# Patient Record
Sex: FEMALE | Race: WHITE | NOT HISPANIC OR LATINO | Employment: OTHER | URBAN - METROPOLITAN AREA
[De-identification: names, ages, dates, MRNs, and addresses within clinical notes are randomized per-mention and may not be internally consistent; named-entity substitution may affect disease eponyms.]

---

## 2017-01-06 ENCOUNTER — NON-PROVIDER VISIT (OUTPATIENT)
Dept: MEDICAL GROUP | Facility: CLINIC | Age: 52
End: 2017-01-06
Payer: COMMERCIAL

## 2017-01-06 DIAGNOSIS — R73.01 IFG (IMPAIRED FASTING GLUCOSE): ICD-10-CM

## 2017-01-06 PROCEDURE — G0109 DIAB MANAGE TRN IND/GROUP: HCPCS | Performed by: DIETITIAN, REGISTERED

## 2017-01-06 NOTE — Clinical Note
Dr. Nieves,    Your patient, Lew Dinh, was seen January 6, 2017 for 2 hours regarding nutrition/exercise recommendations for diabetes as part of day 2 of our Type 2 diabetes management class.  She was taught the following information:     -The basics of nutrition  -The plate method for portion control (¼ plate starch, ¼ plate protein, ½ plate non-starchy vegetables)  -Appropriate snacking recommendations  -Heart-healthy eating, including controlling/managing/improving hyperlipidemia and HTN  -Food label reading, including CHO counting  -Exercise recommendations of at least 150 minutes/week of moderate-intensity exercise on a minimum of 3 days each week    Lew was encouraged to use community resources to help improve their glycemic control and given a number of resources available to her.  She was also given our phone number in the case of any question that may arise.      Thank you for your referral for this patient.  Feel free to contact us with any questions you may have at [phone].    Sincerely,    PURVI [place] Jr, RD, LD, CDE  Outpatient Dietary Educator  [facility]

## 2017-01-06 NOTE — PROGRESS NOTES
1/6/2017    Vivienne Nieves D.O.  51 y.o.   Time in/out: 439 - [phone]    Subjective:  -Here for day 2 of type 2 class    Nutrition Diagnosis (PES Statement)    Altered nutrition-related lab values related to endocrine dysfunction as evidenced by HbA1c of 6.8%.    Biochemical data, medical test and procedures  Lab Results   Component Value Date/Time    GLYCOHEMOGLOBIN 6.8 10/27/2016 04:30 PM     Lab Results   Component Value Date/Time    CHOLESTEROL,* 10/14/2016 08:16 AM    * 10/14/2016 08:16 AM    HDL 44 10/14/2016 08:16 AM    TRIGLYCERIDES 159* 10/14/2016 08:16 AM         Nutrition Intervention  Meal and Snack  Recommend a general/healthful diet    Comprehensive Nutrition education Instruction or training leading to in-depth nutrition related knowledge about:  Benefits to following meal plan, Combine carb, protein and fat at each meal, Eating out, Fast food, Meal timing and spacing, Menu Planning, Metabolism of carb, protein, fat, Physical activity/exercise, Portion control, Sweets and alcohol in moderation, Heart-healthy guidelines, Increasing/Decreasing PO intake, Label Reading and Handouts provided regarding topics discussed    Monitoring & Evaluation Plan  Behavioral-Environmental:  Behavior:  Consistent CHO intake throughout the day  Physical activity:  Increase as tolerated    Food / Nutrient Intake:  Food intake:  Use the plate method recommendations for portions/balance at meals  Macronutrient intake:  ~45 grams CHO with meals, 15-20 grams CHO with snacks    Physical Signs / Symptoms:  HbA1c profiles:  Within ADA guidelines      Assessment Notes:  Lew attended day 2 of the Type 2 class today.  She was taught that exercise works as a medication for controlling DM.  Different exercises were shown that can be done easily at home, like walking in place, lifting light weights while sitting, etc.  It was emphasized that if exercise is a consistent part of Lew’s habits that DM control will be  the most ideal it can be.   Food was then discussed next, with a brief review of Lew’s current eating habits.  She was taught the differences between CHO/protein/fat and their effects on BG’s.  This information was related to the plate method to help with meal planning/portions at meals; she was also taught to use their hands as measuring tools (fist for starch, palm for protein) to help when eating out or on a large plate.  Non-starchy vegetables were emphasized as foods that will help satisfy without raising BG’s at meals and snacks.  I stressed to the patient to not go more than 4 hours without eating and if a snack is necessary she was taught how to construct a proper snack of ~15 grams CHO and ~7 grams protein.  Finally, we moved onto the food label and how to effectively read a label using serving size, trans fat, total CHO, and % sodium to evaluate a food.  An alternative way of meal planning was given by using the food label and CHO counting; the patient can eat ~45 grams CHO at meals and 15-20 grams CHO at snacks, if needed.  Lew set goals to try to achieve in the next 3 months to help with DM control; she can follow-up with me, if needed, for a more intensive meal plan and CHO counting education.  F/u prn.

## 2017-01-06 NOTE — MR AVS SNAPSHOT
Thierry Ford Dinh   2017 9:00 AM   Non-Provider Visit   MRN: [id number]    Department:  [facility]   Dept Phone:  [phone]    Description:  Female : 1965   Provider:  Eric [place] RD           Reason for Visit     Diabetes           Allergies as of 2017     Allergen Noted Reactions    Nkda [No Known Drug Allergy] 2009         You were diagnosed with     IFG (impaired fasting glucose)   [290792]         Vital Signs     Last Menstrual Period Smoking Status                2006 Never Smoker           Basic Information     Date Of Birth Sex Race Ethnicity Preferred Language    1965 Female White Non- English      Your appointments     2017 10:00 AM   Follow UP with ANUEL Menjivar   [facility] Sleep Medicine (--)    [address]  [place] [id number] [phone]            2017  4:00 PM   Established Patient with Vivienne Nieves D.O.   [facility])    [address], [address]  [place] [id number] [phone]           You will be receiving a confirmation call a few days before your appointment from our automated call confirmation system.              Problem List              ICD-10-CM Priority Class Noted - Resolved    Depression with anxiety F41.8   2009 - Present    Allergic rhinitis J30.9   2009 - Present    Hyperlipidemia with target LDL less than 100 E78.5   2010 - Present    Essential hypertension I10   2012 - Present    Fatigue R53.83   2012 - Present    Sleep related hypoxia G47.34   2012 - Present    Obstructive sleep apnea G47.33   10/12/2016 - Present    Hypersomnolence G47.10   10/12/2016 - Present    Insomnia G47.00   10/12/2016 - Present    IFG (impaired fasting glucose) R73.01   10/27/2016 - Present    Obesity (BMI 30.0-34.9) E66.9   10/27/2016 - Present    Body mass index 33.0-33.9, adult Z68.33   10/27/2016 -  Present      Health Maintenance        Date Due Completion Dates    MAMMOGRAM 8/25/2015 8/25/2014, 8/22/2013, 1/3/2012, 9/4/2009, 9/4/2009    PAP SMEAR 9/1/2019 9/1/2016, 8/19/2013, 8/6/2009    IMM DTaP/Tdap/Td Vaccine (2 - Td) 10/24/2021 10/24/2011    COLONOSCOPY 12/28/2026 12/28/2016            Current Immunizations     Hepatitis A Vaccine, Adult 3/10/2015, 7/1/2014    Hepatitis B Vaccine Recombivax (Adol/Adult) 3/10/2015, 8/1/2014, 7/1/2014    Influenza TIV (IM) 11/30/2015, 10/24/2011    Influenza Vaccine Quad Inj (Pf) 9/22/2016    MMR Vaccine 9/4/2014  4:35 PM, 7/1/2014    Tdap Vaccine 10/24/2011    Tuberculin Skin Test 9/16/2014 11:26 AM, 9/2/2014    Varicella Vaccine Live 8/1/2014, 7/1/2014      Below and/or attached are the medications your provider expects you to take. Review all of your home medications and newly ordered medications with your provider and/or pharmacist. Follow medication instructions as directed by your provider and/or pharmacist. Please keep your medication list with you and share with your provider. Update the information when medications are discontinued, doses are changed, or new medications (including over-the-counter products) are added; and carry medication information at all times in the event of emergency situations     Allergies:  NKDA - (reactions not documented)               Medications  Valid as of: January 06, 2017 - 11:51 AM    Generic Name Brand Name Tablet Size Instructions for use    Atorvastatin Calcium (Tab) LIPITOR 80 MG Take 1 Tab by mouth every evening.        Blood Glucose Monitoring Suppl (Misc) Blood Glucose Monitoring Suppl SUPPLIES Test Strips (brand as accepted by insurance)Sig: Use AM fasting.        DiazePAM (Tab) VALIUM 5 MG Take 1 Tab by mouth every 8 hours as needed for Anxiety. Followup visit for additional renewals        Estradiol (Tab) VAGIFEM 10 MCG         Glucose Blood (Strip) RELION PRIME TEST  Apply 1 Strip to affected area(s) every day.         Lancet Devices (Misc) Lancet Device  MATCH TO CURRENT GLUCOMETER, To test fasting in the AM        MetFORMIN HCl (TABLET SR 24 HR) GLUCOPHAGE  MG Take 1 Tab by mouth every day.        Misc. Devices (Kit) Misc. Devices  Glucometer (brand as accepted by insurance). To test fasting in the AM        Triamcinolone Acetonide (Ointment) KENALOG 0.1 % Apply bid with moisturizer as needed for rash        Valsartan (Tab) DIOVAN 160 MG Take 1 Tab by mouth every day.        Venlafaxine HCl (Tab) EFFEXOR 75 MG Take 1 Tab by mouth 2 times a day, with meals.        Zolpidem Tartrate (Tab) AMBIEN 5 MG Take 1 Tab by mouth at bedtime as needed for Sleep.        .                 Medicines prescribed today were sent to:     [facility] [address] [place] [address], [place] - [address] [place] [address]    Phone: [phone] Fax: [phone]    Open 24 Hours?: No      Medication refill instructions:       If your prescription bottle indicates you have medication refills left, it is not necessary to call your provider’s office. Please contact your pharmacy and they will refill your medication.    If your prescription bottle indicates you do not have any refills left, you may request refills at any time through one of the following ways: The online Akimbo system (except Urgent Care), by calling your provider’s office, or by asking your pharmacy to contact your provider’s office with a refill request. Medication refills are processed only during regular business hours and may not be available until the next business day. Your provider may request additional information or to have a follow-up visit with you prior to refilling your medication.   *Please Note: Medication refills are assigned a new Rx number when refilled electronically. Your pharmacy may indicate that no refills were authorized even though a new prescription for the same medication is available at the pharmacy. Please request the medicine by  name with the pharmacy before contacting your provider for a refill.           SonicLiving Access Code: [email or web address]  Expires: 1/7/2017  5:30 PM    SonicLiving  A secure, online tool to manage your health information     [employer]’s SonicLiving® is a secure, online tool that connects you to your personalized health information from the privacy of your home -- day or night - making it very easy for you to manage your healthcare. Once the activation process is completed, you can even access your medical information using the SonicLiving etta, which is available for free in the Apple Etta store or Google Play store.     SonicLiving provides the following levels of access (as shown below):   My Chart Features   [facility] Primary [facility] Doctor [facility]  Specialists [facility]  Urgent  Care Non-[facility]  Primary Care  Doctor   Email your healthcare team securely and privately 24/7 X X X    Manage appointments: schedule your next appointment; view details of past/upcoming appointments X      Request prescription refills. X      View recent personal medical records, including lab and immunizations X X X X   View health record, including health history, allergies, medications X X X X   Read reports about your outpatient visits, procedures, consult and ER notes X X X X   See your discharge summary, which is a recap of your hospital and/or ER visit that includes your diagnosis, lab results, and care plan. X X       How to register for SonicLiving:  1. Go to  [email or web address].  2. Click on the Sign Up Now box, which takes you to the New Member Sign Up page. You will need to provide the following information:  a. Enter your SonicLiving Access Code exactly as it appears at the top of this page. (You will not need to use this code after you’ve completed the sign-up process. If you do not sign up before the expiration date, you must request a new code.)   b. Enter your date of birth.   c. Enter your home email address.   d. Click Submit, and follow the  next screen’s instructions.  3. Create a Expedit.ust ID. This will be your Expedit.ust login ID and cannot be changed, so think of one that is secure and easy to remember.  4. Create a Expedit.ust password. You can change your password at any time.  5. Enter your Password Reset Question and Answer. This can be used at a later time if you forget your password.   6. Enter your e-mail address. This allows you to receive e-mail notifications when new information is available in Openet.  7. Click Sign Up. You can now view your health information.    For assistance activating your Openet account, call [phone]

## 2017-01-13 ENCOUNTER — SLEEP CENTER VISIT (OUTPATIENT)
Dept: SLEEP MEDICINE | Facility: MEDICAL CENTER | Age: 52
End: 2017-01-13
Payer: COMMERCIAL

## 2017-01-13 VITALS
BODY MASS INDEX: 33.29 KG/M2 | HEART RATE: 109 BPM | DIASTOLIC BLOOD PRESSURE: 88 MMHG | WEIGHT: 195 LBS | HEIGHT: 64 IN | SYSTOLIC BLOOD PRESSURE: 124 MMHG | TEMPERATURE: 97.7 F | RESPIRATION RATE: 15 BRPM

## 2017-01-13 DIAGNOSIS — R53.82 CHRONIC FATIGUE: ICD-10-CM

## 2017-01-13 DIAGNOSIS — G47.00 INSOMNIA, UNSPECIFIED TYPE: ICD-10-CM

## 2017-01-13 DIAGNOSIS — F41.8 DEPRESSION WITH ANXIETY: ICD-10-CM

## 2017-01-13 DIAGNOSIS — G47.33 OBSTRUCTIVE SLEEP APNEA: ICD-10-CM

## 2017-01-13 DIAGNOSIS — I10 ESSENTIAL HYPERTENSION: ICD-10-CM

## 2017-01-13 PROCEDURE — 99213 OFFICE O/P EST LOW 20 MIN: CPT | Performed by: NURSE PRACTITIONER

## 2017-01-13 RX ORDER — ZOLPIDEM TARTRATE 5 MG/1
5 TABLET ORAL NIGHTLY PRN
Qty: 30 TAB | Refills: 0 | Status: SHIPPED | OUTPATIENT
Start: 2017-01-13 | End: 2017-04-27 | Stop reason: SDUPTHER

## 2017-01-13 NOTE — Clinical Note
ANUEL Menjivar  [facility]   [address] [place] [address]  Phone: [phone] - Fax: [phone]           Encounter Date: 1/13/2017  Provider: ANUEL Menjivar  Location of Care: [facility] SLEEP [facility]      Patient:   Thierry Dinh   MR Number: [id number]   YOB: 1965     PROGRESS NOTE:  Chief Complaint   Patient presents with   • Follow-Up     6W         HPI: This patient is a 51 y.o. female, who presents for 6 week follow-up of mild JAMIE. She was initially referred by Dr. Scott Falcon. She has had difficulty in falling asleep and staying asleep since the age of 16. She started on sleeping medications around age 26, she has used amitriptyline in the past. She was given a prescription for Ambien at her last visit, she has been using this very sparingly. She is requesting a refill today. She tells me her insomnia is actually improved significantly. She has discontinued amitriptyline and is now taking Effexor in the mornings. She now has a rare nights of insomnia for which she will use Ambien. She understands to use this sparingly.    In regards to JAMIE, Dr. Falcon performed two night home sleep testing demonstrated an apnea hypopnea index of 28.2 events per hour with a lowest arterial oxygen saturation of 85% on room air during the first night and an apnea hypopnea index of 14.5 events per hour with a lowest saturation of 87% on the second night. PSG 9/1/16 indicated mild JAMIE with AHI 5.5 and normal oximetry. Patient wanted to try dental appliance and weight loss. She has not yet obtained a dental appliance. She has a history of lap band surgery and weight loss. Unfortunately she has gained some of the weight back. She also has a history of chronic hip pain and occasionally requires of Valium before bedtime that only slightly helps. I reviewed with her again the treatment options for sleep apnea  including CPAP. She declines. Again she would like to try dental appliance and plans on obtaining this through Dr Falcon.     Past Medical History   Diagnosis Date   • Anxiety    • Psychiatric problem      depression   • Hiatus hernia syndrome    • Heart burn    • Pain      stomach   • Hypertension    • Hyperlipidemia    • Sleep apnea    • Chickenpox    • Khmer measles    • Mumps    • Influenza    • Tonsillitis        Social History   Substance Use Topics   • Smoking status: Never Smoker    • Smokeless tobacco: Never Used      Comment: avoid all tobacco products   • Alcohol Use: 1.2 oz/week     2 Glasses of wine per week      Comment: two per day       Family History   Problem Relation Age of Onset   • Hypertension Mother    • Diabetes Mother    • Stroke Mother    • Heart Disease Mother    • Stroke     • Genetic Father      Parkinsons   • Hypertension Father    • Hyperlipidemia Father    • Hypertension Sister    • Diabetes Maternal Uncle    • Diabetes Paternal Aunt    • Diabetes Paternal Uncle    • Genetic Maternal Grandmother      kidney failure   • Arthritis Maternal Grandfather    • Heart Disease Maternal Grandfather    • Sleep Apnea Neg Hx        Current medications as of today   Current Outpatient Prescriptions   Medication Sig Dispense Refill   • zolpidem (AMBIEN) 5 MG Tab Take 1 Tab by mouth at bedtime as needed for Sleep. 30 Tab 0   • RELION PRIME TEST strip Apply 1 Strip to affected area(s) every day.     • Misc. Devices Kit Glucometer (brand as accepted by insurance). To test fasting in the AM 1 Kit 0   • Lancet Device Misc MATCH TO CURRENT GLUCOMETER, To test fasting in the  Each 4   • Blood Glucose Monitoring Suppl SUPPLIES Misc Test Strips (brand as accepted by insurance)Sig: Use AM fasting. 100 Each 4   • metformin ER (GLUCOPHAGE XR) 500 MG TABLET SR 24 HR Take 1 Tab by mouth every day. 30 Tab 3   • atorvastatin (LIPITOR) 80 MG tablet Take 1 Tab by mouth every evening. 90 Tab 4   • triamcinolone  "acetonide (KENALOG) 0.1 % Ointment Apply bid with moisturizer as needed for rash 1 Tube 2   • VAGIFEM 10 MCG      • valsartan (DIOVAN) 160 MG Tab Take 1 Tab by mouth every day. 90 Tab 4   • venlafaxine (EFFEXOR) 75 MG Tab Take 1 Tab by mouth 2 times a day, with meals. (Patient taking differently: Take 75 mg by mouth every day.) 180 Tab 4   • diazepam (VALIUM) 5 MG Tab Take 1 Tab by mouth every 8 hours as needed for Anxiety. Followup visit for additional renewals 60 Tab 2     No current facility-administered medications for this visit.       Allergies: Nkda    Blood pressure 124/88, pulse 109, temperature 36.5 °C (97.7 °F), temperature source Temporal, resp. rate 15, height 1.626 m (5' 4.02\"), weight 88.451 kg (195 lb).      ROS:   Constitutional: Denies fevers, chills, night sweats, weight loss. Positive for fatigue.  HEENT: Denies earache, difficulty hearing, tinnitus, nasal congestion, hoarseness  Cardiovascular: Denies chest pain, tightness, palpitations, orthopnea or edema  Respiratory: Denies cough, wheeze, dyspnea, hemoptysis  Sleep: See HPI  GI: Denies heartburn, dysphagia, nausea, abdominal pain, diarrhea or constipation  : Denies frequent urination, hematuria, discharge or painful urination  Musculoskeletal: Positive for chronic  hip pain  Neurological: Denies weakness or headaches  Skin: No rashes    Physical exam:   Appearance: Well-nourished, well-developed, in no acute distress  HEENT: Normocephalic, atraumatic, white sclera, PERRLA  Respiratory: no intercostal retractions or accessory muscle use   Gait: Normal  Digits: No clubbing, cyanosis  Motor: No focal deficits  Orientation: Oriented to time, person and place    Diagnosis:  1. Obstructive sleep apnea  zolpidem (AMBIEN) 5 MG Tab   2. Insomnia, unspecified type  zolpidem (AMBIEN) 5 MG Tab   3. Body mass index 33.0-33.9, adult     4. Depression with anxiety     5. Essential hypertension     6. Chronic fatigue         Plan:  1. Rx refill of Ambien " provided. If insomnia worsens referral to Dr Jaci Walsh is recommended. I will defer future refills of Ambien to patient's PCP. We do not need to continue to see her for her periodic insomnia.  2. In regards to JAMIE, dental appliance is recommended. She will obtain this from Dr. Falcon. Home sleep study can be repeated after dental appliance is obtained to confirm improvement in obstructive events and nocturnal oxygen saturations.  3. Follow-up here as needed          Electronically signed by ANUEL Menjivar  on 01/13/2017    Vivienne Nieves D.O.  [address]  [address]  [place] [address]  VIA In Basket     Esteban Falcon D.D.S.  [address]  [place] [address]  VIA Facsimile: [phone]

## 2017-01-13 NOTE — PATIENT INSTRUCTIONS
1. Rx for Ambien provided, future refills from PCP  2. Obtain dental appliance from Dr. Falcon  3. Follow-up here as needed

## 2017-01-13 NOTE — MR AVS SNAPSHOT
"        Thierry Dinh   2017 10:00 AM   Sleep Center Visit   MRN: [id number]    Department:  [facility]   Dept Phone:  [phone]    Description:  Female : 1965   Provider:  ANUEL Menjivar           Reason for Visit     Follow-Up 6W      Allergies as of 2017     Allergen Noted Reactions    Nkda [No Known Drug Allergy] 2009         You were diagnosed with     Obstructive sleep apnea   [924236]       Insomnia, unspecified type   [6650921]       Body mass index 33.0-33.9, adult   [V85.33.ICD-9-CM]       Depression with anxiety   [810239]       Essential hypertension   [7704775]       Chronic fatigue   [232038]         Vital Signs     Blood Pressure Pulse Temperature Respirations Height Weight    124/88 mmHg 109 36.5 °C (97.7 °F) (Temporal) 15 1.626 m (5' 4.02\") 88.451 kg (195 lb)    Body Mass Index Smoking Status                33.46 kg/m2 Never Smoker           Basic Information     Date Of Birth Sex Race Ethnicity Preferred Language    1965 Female White Non- English      Your appointments     2017  4:00 PM   Established Patient with Vivienne Nieves D.O.   Formerly [facility])    [address] [place] [address], [address]  [facility] [address] [phone]           You will be receiving a confirmation call a few days before your appointment from our automated call confirmation system.              Problem List              ICD-10-CM Priority Class Noted - Resolved    Depression with anxiety F41.8   2009 - Present    Allergic rhinitis J30.9   2009 - Present    Hyperlipidemia with target LDL less than 100 E78.5   2010 - Present    Essential hypertension I10   2012 - Present    Fatigue R53.83   2012 - Present    Sleep related hypoxia G47.34   2012 - Present    Obstructive sleep apnea G47.33   10/12/2016 - Present    Hypersomnolence G47.10   10/12/2016 - Present    Insomnia G47.00   10/12/2016 - Present   "    IFG (impaired fasting glucose) R73.01   10/27/2016 - Present    Obesity (BMI 30.0-34.9) E66.9   10/27/2016 - Present    Body mass index 33.0-33.9, adult Z68.33   10/27/2016 - Present      Health Maintenance        Date Due Completion Dates    MAMMOGRAM 8/25/2015 8/25/2014, 8/22/2013, 1/3/2012, 9/4/2009, 9/4/2009    PAP SMEAR 9/1/2019 9/1/2016, 8/19/2013, 8/6/2009    IMM DTaP/Tdap/Td Vaccine (2 - Td) 10/24/2021 10/24/2011    COLONOSCOPY 12/28/2026 12/28/2016            Current Immunizations     Hepatitis A Vaccine, Adult 3/10/2015, 7/1/2014    Hepatitis B Vaccine Recombivax (Adol/Adult) 3/10/2015, 8/1/2014, 7/1/2014    Influenza TIV (IM) 11/30/2015, 10/24/2011    Influenza Vaccine Quad Inj (Pf) 9/22/2016    MMR Vaccine 9/4/2014  4:35 PM, 7/1/2014    Tdap Vaccine 10/24/2011    Tuberculin Skin Test 9/16/2014 11:26 AM, 9/2/2014    Varicella Vaccine Live 8/1/2014, 7/1/2014      Below and/or attached are the medications your provider expects you to take. Review all of your home medications and newly ordered medications with your provider and/or pharmacist. Follow medication instructions as directed by your provider and/or pharmacist. Please keep your medication list with you and share with your provider. Update the information when medications are discontinued, doses are changed, or new medications (including over-the-counter products) are added; and carry medication information at all times in the event of emergency situations     Allergies:  NKDA - (reactions not documented)               Medications  Valid as of: January 13, 2017 - 10:36 AM    Generic Name Brand Name Tablet Size Instructions for use    Atorvastatin Calcium (Tab) LIPITOR 80 MG Take 1 Tab by mouth every evening.        Blood Glucose Monitoring Suppl (Misc) Blood Glucose Monitoring Suppl SUPPLIES Test Strips (brand as accepted by insurance)Sig: Use AM fasting.        DiazePAM (Tab) VALIUM 5 MG Take 1 Tab by mouth every 8 hours as needed for Anxiety.  Followup visit for additional renewals        Estradiol (Tab) VAGIFEM 10 MCG         Glucose Blood (Strip) RELION PRIME TEST  Apply 1 Strip to affected area(s) every day.        Lancet Devices (Misc) Lancet Device  MATCH TO CURRENT GLUCOMETER, To test fasting in the AM        MetFORMIN HCl (TABLET SR 24 HR) GLUCOPHAGE  MG Take 1 Tab by mouth every day.        Misc. Devices (Kit) Misc. Devices  Glucometer (brand as accepted by insurance). To test fasting in the AM        Triamcinolone Acetonide (Ointment) KENALOG 0.1 % Apply bid with moisturizer as needed for rash        Valsartan (Tab) DIOVAN 160 MG Take 1 Tab by mouth every day.        Venlafaxine HCl (Tab) EFFEXOR 75 MG Take 1 Tab by mouth 2 times a day, with meals.        Zolpidem Tartrate (Tab) AMBIEN 5 MG Take 1 Tab by mouth at bedtime as needed for Sleep.        .                 Medicines prescribed today were sent to:     [facility] [address] [place] [id number]    Phone: [phone] Fax: [phone]    Open 24 Hours?: No      Medication refill instructions:       If your prescription bottle indicates you have medication refills left, it is not necessary to call your provider’s office. Please contact your pharmacy and they will refill your medication.    If your prescription bottle indicates you do not have any refills left, you may request refills at any time through one of the following ways: The online ClariFI system (except Urgent Care), by calling your provider’s office, or by asking your pharmacy to contact your provider’s office with a refill request. Medication refills are processed only during regular business hours and may not be available until the next business day. Your provider may request additional information or to have a follow-up visit with you prior to refilling your medication.   *Please Note: Medication refills are assigned a new Rx number when refilled electronically.  Your pharmacy may indicate that no refills were authorized even though a new prescription for the same medication is available at the pharmacy. Please request the medicine by name with the pharmacy before contacting your provider for a refill.        Instructions    1. Rx for Ambien provided, future refills from PCP  2. Obtain dental appliance from Dr. Falcon  3. Follow-up here as needed          Moovly Access Code: [email or web address]  Expires: 2/12/2017 10:36 AM    Moovly  A secure, online tool to manage your health information     [employer]’s Moovly® is a secure, online tool that connects you to your personalized health information from the privacy of your home -- day or night - making it very easy for you to manage your healthcare. Once the activation process is completed, you can even access your medical information using the Moovly etta, which is available for free in the Apple Etta store or Google Play store.     Moovly provides the following levels of access (as shown below):   My Chart Features   [facility] Primary Care Doctor [facility]  Specialists [facility]  Urgent  Care Non-[facility]  Primary Care  Doctor   Email your healthcare team securely and privately 24/7 X X X    Manage appointments: schedule your next appointment; view details of past/upcoming appointments X      Request prescription refills. X      View recent personal medical records, including lab and immunizations X X X X   View health record, including health history, allergies, medications X X X X   Read reports about your outpatient visits, procedures, consult and ER notes X X X X   See your discharge summary, which is a recap of your hospital and/or ER visit that includes your diagnosis, lab results, and care plan. X X       How to register for Moovly:  1. Go to  [email or web address].  2. Click on the Sign Up Now box, which takes you to the New Member Sign Up page. You will need to provide the following information:  a. Enter your Moovly  Access Code exactly as it appears at the top of this page. (You will not need to use this code after you’ve completed the sign-up process. If you do not sign up before the expiration date, you must request a new code.)   b. Enter your date of birth.   c. Enter your home email address.   d. Click Submit, and follow the next screen’s instructions.  3. Create a Globe Icons Interactive ID. This will be your Globe Icons Interactive login ID and cannot be changed, so think of one that is secure and easy to remember.  4. Create a [employer] password. You can change your password at any time.  5. Enter your Password Reset Question and Answer. This can be used at a later time if you forget your password.   6. Enter your e-mail address. This allows you to receive e-mail notifications when new information is available in Globe Icons Interactive.  7. Click Sign Up. You can now view your health information.    For assistance activating your Globe Icons Interactive account, call [phone]

## 2017-01-13 NOTE — PROGRESS NOTES
Chief Complaint   Patient presents with   • Follow-Up     6W         HPI: This patient is a 51 y.o. female, who presents for 6 week follow-up of mild JAMIE. She was initially referred by Dr. Scott Falcon. She has had difficulty in falling asleep and staying asleep since the age of 16. She started on sleeping medications around age 26, she has used amitriptyline in the past. She was given a prescription for Ambien at her last visit, she has been using this very sparingly. She is requesting a refill today. She tells me her insomnia is actually improved significantly. She has discontinued amitriptyline and is now taking Effexor in the mornings. She now has a rare nights of insomnia for which she will use Ambien. She understands to use this sparingly.    In regards to JAMIE, Dr. Falcon performed two night home sleep testing demonstrated an apnea hypopnea index of 28.2 events per hour with a lowest arterial oxygen saturation of 85% on room air during the first night and an apnea hypopnea index of 14.5 events per hour with a lowest saturation of 87% on the second night. PSG 9/1/16 indicated mild JAMIE with AHI 5.5 and normal oximetry. Patient wanted to try dental appliance and weight loss. She has not yet obtained a dental appliance. She has a history of lap band surgery and weight loss. Unfortunately she has gained some of the weight back. She also has a history of chronic hip pain and occasionally requires of Valium before bedtime that only slightly helps. I reviewed with her again the treatment options for sleep apnea including CPAP. She declines. Again she would like to try dental appliance and plans on obtaining this through Dr Falcon.     Past Medical History   Diagnosis Date   • Anxiety    • Psychiatric problem      depression   • Hiatus hernia syndrome    • Heart burn    • Pain      stomach   • Hypertension    • Hyperlipidemia    • Sleep apnea    • Chickenpox    • Italian measles    • Mumps    • Influenza    •  Tonsillitis        Social History   Substance Use Topics   • Smoking status: Never Smoker    • Smokeless tobacco: Never Used      Comment: avoid all tobacco products   • Alcohol Use: 1.2 oz/week     2 Glasses of wine per week      Comment: two per day       Family History   Problem Relation Age of Onset   • Hypertension Mother    • Diabetes Mother    • Stroke Mother    • Heart Disease Mother    • Stroke     • Genetic Father      Parkinsons   • Hypertension Father    • Hyperlipidemia Father    • Hypertension Sister    • Diabetes Maternal Uncle    • Diabetes Paternal Aunt    • Diabetes Paternal Uncle    • Genetic Maternal Grandmother      kidney failure   • Arthritis Maternal Grandfather    • Heart Disease Maternal Grandfather    • Sleep Apnea Neg Hx        Current medications as of today   Current Outpatient Prescriptions   Medication Sig Dispense Refill   • zolpidem (AMBIEN) 5 MG Tab Take 1 Tab by mouth at bedtime as needed for Sleep. 30 Tab 0   • RELION PRIME TEST strip Apply 1 Strip to affected area(s) every day.     • Misc. Devices Kit Glucometer (brand as accepted by insurance). To test fasting in the AM 1 Kit 0   • Lancet Device Misc MATCH TO CURRENT GLUCOMETER, To test fasting in the  Each 4   • Blood Glucose Monitoring Suppl SUPPLIES Misc Test Strips (brand as accepted by insurance)Sig: Use AM fasting. 100 Each 4   • metformin ER (GLUCOPHAGE XR) 500 MG TABLET SR 24 HR Take 1 Tab by mouth every day. 30 Tab 3   • atorvastatin (LIPITOR) 80 MG tablet Take 1 Tab by mouth every evening. 90 Tab 4   • triamcinolone acetonide (KENALOG) 0.1 % Ointment Apply bid with moisturizer as needed for rash 1 Tube 2   • VAGIFEM 10 MCG      • valsartan (DIOVAN) 160 MG Tab Take 1 Tab by mouth every day. 90 Tab 4   • venlafaxine (EFFEXOR) 75 MG Tab Take 1 Tab by mouth 2 times a day, with meals. (Patient taking differently: Take 75 mg by mouth every day.) 180 Tab 4   • diazepam (VALIUM) 5 MG Tab Take 1 Tab by mouth every 8  "hours as needed for Anxiety. Followup visit for additional renewals 60 Tab 2     No current facility-administered medications for this visit.       Allergies: Nkda    Blood pressure 124/88, pulse 109, temperature 36.5 °C (97.7 °F), temperature source Temporal, resp. rate 15, height 1.626 m (5' 4.02\"), weight 88.451 kg (195 lb).      ROS:   Constitutional: Denies fevers, chills, night sweats, weight loss. Positive for fatigue.  HEENT: Denies earache, difficulty hearing, tinnitus, nasal congestion, hoarseness  Cardiovascular: Denies chest pain, tightness, palpitations, orthopnea or edema  Respiratory: Denies cough, wheeze, dyspnea, hemoptysis  Sleep: See HPI  GI: Denies heartburn, dysphagia, nausea, abdominal pain, diarrhea or constipation  : Denies frequent urination, hematuria, discharge or painful urination  Musculoskeletal: Positive for chronic  hip pain  Neurological: Denies weakness or headaches  Skin: No rashes    Physical exam:   Appearance: Well-nourished, well-developed, in no acute distress  HEENT: Normocephalic, atraumatic, white sclera, PERRLA  Respiratory: no intercostal retractions or accessory muscle use   Gait: Normal  Digits: No clubbing, cyanosis  Motor: No focal deficits  Orientation: Oriented to time, person and place    Diagnosis:  1. Obstructive sleep apnea  zolpidem (AMBIEN) 5 MG Tab   2. Insomnia, unspecified type  zolpidem (AMBIEN) 5 MG Tab   3. Body mass index 33.0-33.9, adult     4. Depression with anxiety     5. Essential hypertension     6. Chronic fatigue         Plan:  1. Rx refill of Ambien provided. If insomnia worsens referral to Dr Jaci Walsh is recommended. I will defer future refills of Ambien to patient's PCP. We do not need to continue to see her for her periodic insomnia.  2. In regards to JAMIE, dental appliance is recommended. She will obtain this from Dr. Falcon. Home sleep study can be repeated after dental appliance is obtained to confirm improvement in obstructive events " and nocturnal oxygen saturations.  3. Follow-up here as needed

## 2017-01-31 RX ORDER — METFORMIN HYDROCHLORIDE 500 MG/1
TABLET, EXTENDED RELEASE ORAL
Qty: 30 TAB | Refills: 2 | Status: SHIPPED | OUTPATIENT
Start: 2017-01-31 | End: 2017-04-27 | Stop reason: SDUPTHER

## 2017-01-31 NOTE — TELEPHONE ENCOUNTER
Was the patient seen in the last year in this department? Yes     Does patient have an active prescription for medications requested? No     Received Request Via: Pharmacy      Pt met protocol?: Yes    Last A1C 10/16

## 2017-02-21 RX ORDER — VALSARTAN 160 MG/1
TABLET ORAL
Qty: 90 TAB | Refills: 0 | Status: SHIPPED | OUTPATIENT
Start: 2017-02-21 | End: 2017-05-21 | Stop reason: SDUPTHER

## 2017-02-21 RX ORDER — VENLAFAXINE 75 MG/1
TABLET ORAL
Qty: 180 TAB | Refills: 0 | Status: SHIPPED | OUTPATIENT
Start: 2017-02-21 | End: 2017-05-21 | Stop reason: SDUPTHER

## 2017-02-21 NOTE — TELEPHONE ENCOUNTER
Was the patient seen in the last year in this department? Yes     Does patient have an active prescription for medications requested? No     Received Request Via: Pharmacy      Pt met protocol?: Yes    LAST OV 12/08/16    BP Readings from Last 1 Encounters:   01/13/17 124/88

## 2017-03-23 ENCOUNTER — PATIENT MESSAGE (OUTPATIENT)
Dept: MEDICAL GROUP | Facility: PHYSICIAN GROUP | Age: 52
End: 2017-03-23

## 2017-03-23 DIAGNOSIS — G47.30 SLEEP APNEA, UNSPECIFIED TYPE: ICD-10-CM

## 2017-04-27 ENCOUNTER — HOSPITAL ENCOUNTER (OUTPATIENT)
Dept: RADIOLOGY | Facility: MEDICAL CENTER | Age: 52
End: 2017-04-27
Attending: FAMILY MEDICINE
Payer: COMMERCIAL

## 2017-04-27 ENCOUNTER — OFFICE VISIT (OUTPATIENT)
Dept: MEDICAL GROUP | Facility: PHYSICIAN GROUP | Age: 52
End: 2017-04-27
Payer: COMMERCIAL

## 2017-04-27 VITALS
HEIGHT: 64 IN | WEIGHT: 194 LBS | TEMPERATURE: 98.2 F | BODY MASS INDEX: 33.12 KG/M2 | HEART RATE: 109 BPM | SYSTOLIC BLOOD PRESSURE: 120 MMHG | OXYGEN SATURATION: 94 % | RESPIRATION RATE: 16 BRPM | DIASTOLIC BLOOD PRESSURE: 80 MMHG

## 2017-04-27 DIAGNOSIS — R73.01 IFG (IMPAIRED FASTING GLUCOSE): ICD-10-CM

## 2017-04-27 DIAGNOSIS — G47.00 INSOMNIA, UNSPECIFIED TYPE: ICD-10-CM

## 2017-04-27 DIAGNOSIS — Z12.31 SCREENING MAMMOGRAM, ENCOUNTER FOR: ICD-10-CM

## 2017-04-27 PROCEDURE — 99213 OFFICE O/P EST LOW 20 MIN: CPT | Performed by: FAMILY MEDICINE

## 2017-04-27 PROCEDURE — 77063 BREAST TOMOSYNTHESIS BI: CPT

## 2017-04-27 RX ORDER — ZOLPIDEM TARTRATE 5 MG/1
5 TABLET ORAL NIGHTLY PRN
Qty: 30 TAB | Refills: 3 | Status: SHIPPED | OUTPATIENT
Start: 2017-04-27 | End: 2018-02-16 | Stop reason: SDUPTHER

## 2017-04-27 RX ORDER — METFORMIN HYDROCHLORIDE 500 MG/1
TABLET, EXTENDED RELEASE ORAL
Qty: 90 TAB | Refills: 3 | Status: SHIPPED | OUTPATIENT
Start: 2017-04-27 | End: 2018-02-16

## 2017-04-27 NOTE — PROGRESS NOTES
Subjective:     Chief Complaint   Patient presents with   • Diabetes       Thierry Dinh is a 51 y.o. female here today for follow-up on diabetes. Patient was last seen 12/8/16. For follow-up on impaired fasting blood sugars. Hemoglobin A1c was 6.8 in October 2016. Patient was started on metformin.  Pt reports morning fasting blood sugars in the 150s.  Pt states GI upset from metformin has resolved.  Patient is compliant with medications. No side effects reported such as diarrhea, abdominal pain, dark tarry stool, hematochezia, headache, shakiness, or lightheadedness due to low blood sugar.  Patient denies chest pain, numbness and tingling in hands and feet, change in sensation in hands or feet, sores on feet, change in urine,  or change in vision.    Patient has history of insomnia. She uses Ambien as needed to induce sleep. Patient states uses less than once per week     Allergies   Allergen Reactions   • Nkda [No Known Drug Allergy]      Current medicines (including changes today)  Current Outpatient Prescriptions   Medication Sig Dispense Refill   • zolpidem (AMBIEN) 5 MG Tab Take 1 Tab by mouth at bedtime as needed for Sleep. 30 Tab 3   • metformin ER (GLUCOPHAGE XR) 500 MG TABLET SR 24 HR TAKE ONE TABLET BY MOUTH ONCE DAILY 90 Tab 3   • valsartan (DIOVAN) 160 MG Tab TAKE ONE TABLET BY MOUTH ONCE DAILY 90 Tab 0   • venlafaxine (EFFEXOR) 75 MG Tab TAKE ONE TABLET BY MOUTH TWICE DAILY WITH MEALS 180 Tab 0   • atorvastatin (LIPITOR) 80 MG tablet Take 1 Tab by mouth every evening. 90 Tab 4   • RELION PRIME TEST strip Apply 1 Strip to affected area(s) every day.     • Misc. Devices Kit Glucometer (brand as accepted by insurance). To test fasting in the AM 1 Kit 0   • Lancet Device Misc MATCH TO CURRENT GLUCOMETER, To test fasting in the  Each 4   • Blood Glucose Monitoring Suppl SUPPLIES Misc Test Strips (brand as accepted by insurance)Sig: Use AM fasting. 100 Each 4   • triamcinolone acetonide (KENALOG)  0.1 % Ointment Apply bid with moisturizer as needed for rash 1 Tube 2   • VAGIFEM 10 MCG      • diazepam (VALIUM) 5 MG Tab Take 1 Tab by mouth every 8 hours as needed for Anxiety. Followup visit for additional renewals 60 Tab 2     No current facility-administered medications for this visit.     Social History   Substance Use Topics   • Smoking status: Never Smoker    • Smokeless tobacco: Never Used      Comment: avoid all tobacco products   • Alcohol Use: 1.2 oz/week     2 Glasses of wine per week      Comment: two per day     Family Status   Relation Status Death Age   • Mother Alive    • Father Alive    • Sister Alive    • Maternal Uncle Alive    • Paternal Aunt Alive    • Paternal Uncle Alive    • Maternal Grandmother     • Maternal Grandfather     • Brother Alive    • Maternal Aunt Alive    • Paternal Grandmother     • Paternal Grandfather       Family History   Problem Relation Age of Onset   • Hypertension Mother    • Diabetes Mother    • Stroke Mother    • Heart Disease Mother    • Stroke     • Genetic Father      Parkinsons   • Hypertension Father    • Hyperlipidemia Father    • Hypertension Sister    • Diabetes Maternal Uncle    • Diabetes Paternal Aunt    • Diabetes Paternal Uncle    • Genetic Maternal Grandmother      kidney failure   • Arthritis Maternal Grandfather    • Heart Disease Maternal Grandfather    • Sleep Apnea Neg Hx      She    has a past medical history of Anxiety; Psychiatric problem; Hiatus hernia syndrome; Heart burn; Pain; Hypertension; Hyperlipidemia; Sleep apnea; Chickenpox; Amharic measles; Mumps; Influenza; and Tonsillitis.        ROS  GEN: no weight loss, fevers, or chills  HEENT: no blurry vision or change in vision  Resp: no shortness of breath, no cough  CV: no racing heart, no irregular beats, no chest pain  Abd: no nausea, no vomiting, no diarrhea, no constipation, no blood in stool, no dark stools, no incontinence  : no dysuria, no hematuria,  "no urinary incontinence, no increased frequency  MSK: no muscle aches, no joint pain, no limited motion  Neuro: no headaches, no dizziness, no LOC, no weakness, no numbness/tingling       Objective:     Blood pressure 120/80, pulse 109, temperature 36.8 °C (98.2 °F), resp. rate 16, height 1.626 m (5' 4.02\"), weight 87.998 kg (194 lb), SpO2 94 %. Body mass index is 33.28 kg/(m^2).   Physical Exam:  Constitutional: Alert, no distress.  Skin: Warm, dry, good turgor, no rashes in visible areas.  Eye: Equal, round and reactive, conjunctiva clear, lids normal.  ENMT: Lips without lesions, oropharynx non-erythematous, no exudate, moist oral mucosa, bilateral tympanic membranes: No bulging, no retraction, no fluid, nonerythematous, positive light reflex, external auditory canals: Clear, scant cerumen, nonerythematous  Neck: Trachea midline, no masses, no thyromegaly. No cervical or supraclavicular lymphadenopathy. Full ROM  Respiratory: Unlabored respiratory effort, good air movement, lungs clear to auscultation, no wheezes, no ronchi.  Cardiovascular:RRR, +S1, S2, no murmur, no peripheral edema, pedal and radial pulses equal and intact bilat  Abdomen: Soft, non-tender, no masses, no hepatosplenomegaly.  MSK:5/5 muscle strength in upper extremities as well as lower extremity bilaterally  Psych: Alert and oriented x3, appropriate affect and mood.  Neuro: CN2-12 intact, no gross motor or sensory deficits      Assessment and Plan:   The following treatment plan was discussed    1. IFG (impaired fasting glucose)  Recommend labs for follow-up. Continue use of metformin. Pending labs will make further recommendations.   - HEMOGLOBIN A1C; Future  - MICROALBUMIN CREAT RATIO URINE; Future  - COMP METABOLIC PANEL; Future  - metformin ER (GLUCOPHAGE XR) 500 MG TABLET SR 24 HR; TAKE ONE TABLET BY MOUTH ONCE DAILY  Dispense: 90 Tab; Refill: 3    2. Insomnia, unspecified type  Chronic: Stable with use of Ambien.  verified.  - zolpidem " (AMBIEN) 5 MG Tab; Take 1 Tab by mouth at bedtime as needed for Sleep.  Dispense: 30 Tab; Refill: 3      Followup: Return in about 6 months (around 10/27/2017) for f/u ifg.    Please note that this dictation was created using voice recognition software. I have made every reasonable attempt to correct obvious errors, but I expect that there are errors of grammar and possibly content that I did not discover before finalizing the note.

## 2017-04-27 NOTE — MR AVS SNAPSHOT
"        Thierry Dinh   2017 4:00 PM   Office Visit   MRN: [id number]    Department:  [facility]   Dept Phone:  [phone]    Description:  Female : 1965   Provider:  Vivienne Nieves D.O.           Reason for Visit     Diabetes           Allergies as of 2017     Allergen Noted Reactions    Nkda [No Known Drug Allergy] 2009         You were diagnosed with     IFG (impaired fasting glucose)   [721854]       Primary insomnia   [886409]       Obstructive sleep apnea   [588265]       Insomnia, unspecified type   [3753412]         Vital Signs     Blood Pressure Pulse Temperature Respirations Height Weight    120/80 mmHg 109 36.8 °C (98.2 °F) 16 1.626 m (5' 4.02\") 87.998 kg (194 lb)    Body Mass Index Oxygen Saturation Smoking Status             33.28 kg/m2 94% Never Smoker          Basic Information     Date Of Birth Sex Race Ethnicity Preferred Language    1965 Female White Non- English      Problem List              ICD-10-CM Priority Class Noted - Resolved    Depression with anxiety F41.8   2009 - Present    Allergic rhinitis J30.9   2009 - Present    Hyperlipidemia with target LDL less than 100 E78.5   2010 - Present    Essential hypertension I10   2012 - Present    Fatigue R53.83   2012 - Present    Sleep related hypoxia G47.34   2012 - Present    Obstructive sleep apnea G47.33   10/12/2016 - Present    Hypersomnolence G47.10   10/12/2016 - Present    Insomnia G47.00   10/12/2016 - Present    IFG (impaired fasting glucose) R73.01   10/27/2016 - Present    Obesity (BMI 30.0-34.9) E66.9   10/27/2016 - Present    Body mass index 33.0-33.9, adult Z68.33   10/27/2016 - Present      Health Maintenance        Date Due Completion Dates    MAMMOGRAM 2015, 2013, 1/3/2012, 2009, 2009    PAP SMEAR 2019, 2013, 2009    IMM DTaP/Tdap/Td Vaccine (2 - Td) 10/24/2021 10/24/2011    COLONOSCOPY 2026 " 12/28/2016            Current Immunizations     Hepatitis A Vaccine, Adult 3/10/2015, 7/1/2014    Hepatitis B Vaccine Recombivax (Adol/Adult) 3/10/2015, 8/1/2014, 7/1/2014    Influenza TIV (IM) 11/30/2015, 10/24/2011    Influenza Vaccine Quad Inj (Pf) 9/22/2016    MMR Vaccine 9/4/2014  4:35 PM, 7/1/2014    Tdap Vaccine 10/24/2011    Tuberculin Skin Test 9/16/2014 11:26 AM, 9/2/2014    Varicella Vaccine Live 8/1/2014, 7/1/2014      Below and/or attached are the medications your provider expects you to take. Review all of your home medications and newly ordered medications with your provider and/or pharmacist. Follow medication instructions as directed by your provider and/or pharmacist. Please keep your medication list with you and share with your provider. Update the information when medications are discontinued, doses are changed, or new medications (including over-the-counter products) are added; and carry medication information at all times in the event of emergency situations     Allergies:  NKDA - (reactions not documented)               Medications  Valid as of: April 27, 2017 -  5:54 PM    Generic Name Brand Name Tablet Size Instructions for use    Atorvastatin Calcium (Tab) LIPITOR 80 MG Take 1 Tab by mouth every evening.        Blood Glucose Monitoring Suppl (Misc) Blood Glucose Monitoring Suppl SUPPLIES Test Strips (brand as accepted by insurance)Sig: Use AM fasting.        DiazePAM (Tab) VALIUM 5 MG Take 1 Tab by mouth every 8 hours as needed for Anxiety. Followup visit for additional renewals        Estradiol (Tab) VAGIFEM 10 MCG         Glucose Blood (Strip) RELION PRIME TEST  Apply 1 Strip to affected area(s) every day.        Lancet Devices (Misc) Lancet Device  MATCH TO CURRENT GLUCOMETER, To test fasting in the AM        MetFORMIN HCl (TABLET SR 24 HR) GLUCOPHAGE  MG TAKE ONE TABLET BY MOUTH ONCE DAILY        Misc. Devices (Kit) Misc. Devices  Glucometer (brand as accepted by insurance). To  test fasting in the AM        Triamcinolone Acetonide (Ointment) KENALOG 0.1 % Apply bid with moisturizer as needed for rash        Valsartan (Tab) DIOVAN 160 MG TAKE ONE TABLET BY MOUTH ONCE DAILY        Venlafaxine HCl (Tab) EFFEXOR 75 MG TAKE ONE TABLET BY MOUTH TWICE DAILY WITH MEALS        Zolpidem Tartrate (Tab) AMBIEN 5 MG Take 1 Tab by mouth at bedtime as needed for Sleep.        .                 Medicines prescribed today were sent to:     [address] [place] [address] [place] [address]    Phone: [phone] Fax: [phone]    Open 24 Hours?: No      Medication refill instructions:       If your prescription bottle indicates you have medication refills left, it is not necessary to call your provider’s office. Please contact your pharmacy and they will refill your medication.    If your prescription bottle indicates you do not have any refills left, you may request refills at any time through one of the following ways: The online [employer] system (except Urgent Care), by calling your provider’s office, or by asking your pharmacy to contact your provider’s office with a refill request. Medication refills are processed only during regular business hours and may not be available until the next business day. Your provider may request additional information or to have a follow-up visit with you prior to refilling your medication.   *Please Note: Medication refills are assigned a new Rx number when refilled electronically. Your pharmacy may indicate that no refills were authorized even though a new prescription for the same medication is available at the pharmacy. Please request the medicine by name with the pharmacy before contacting your provider for a refill.        Your To Do List     Future Labs/Procedures Complete By Expires    COMP METABOLIC PANEL  As directed 4/27/2018    HEMOGLOBIN A1C  As directed 4/27/2018    MICROALBUMIN CREAT RATIO URINE  As  directed 4/27/2018         [facility]cted Access Code: Activation code not generated  Current Health-[facility]cted Status: Active

## 2017-04-28 ENCOUNTER — HOSPITAL ENCOUNTER (OUTPATIENT)
Dept: LAB | Facility: MEDICAL CENTER | Age: 52
End: 2017-04-28
Attending: FAMILY MEDICINE
Payer: COMMERCIAL

## 2017-04-28 DIAGNOSIS — R73.01 IFG (IMPAIRED FASTING GLUCOSE): ICD-10-CM

## 2017-04-28 LAB
ALBUMIN SERPL BCP-MCNC: 4.1 G/DL (ref 3.2–4.9)
ALBUMIN/GLOB SERPL: 1.4 G/DL
ALP SERPL-CCNC: 58 U/L (ref 30–99)
ALT SERPL-CCNC: 16 U/L (ref 2–50)
ANION GAP SERPL CALC-SCNC: 11 MMOL/L (ref 0–11.9)
AST SERPL-CCNC: 26 U/L (ref 12–45)
BILIRUB SERPL-MCNC: 0.7 MG/DL (ref 0.1–1.5)
BUN SERPL-MCNC: 24 MG/DL (ref 8–22)
CALCIUM SERPL-MCNC: 9.1 MG/DL (ref 8.5–10.5)
CHLORIDE SERPL-SCNC: 109 MMOL/L (ref 96–112)
CO2 SERPL-SCNC: 19 MMOL/L (ref 20–33)
CREAT SERPL-MCNC: 0.72 MG/DL (ref 0.5–1.4)
CREAT UR-MCNC: 134.6 MG/DL
EST. AVERAGE GLUCOSE BLD GHB EST-MCNC: 143 MG/DL
GFR SERPL CREATININE-BSD FRML MDRD: >60 ML/MIN/1.73 M 2
GLOBULIN SER CALC-MCNC: 3 G/DL (ref 1.9–3.5)
GLUCOSE SERPL-MCNC: 152 MG/DL (ref 65–99)
HBA1C MFR BLD: 6.6 % (ref 0–5.6)
MICROALBUMIN UR-MCNC: 1.8 MG/DL
MICROALBUMIN/CREAT UR: 13 MG/G (ref 0–30)
POTASSIUM SERPL-SCNC: 4.8 MMOL/L (ref 3.6–5.5)
PROT SERPL-MCNC: 7.1 G/DL (ref 6–8.2)
SODIUM SERPL-SCNC: 139 MMOL/L (ref 135–145)

## 2017-04-28 PROCEDURE — 82570 ASSAY OF URINE CREATININE: CPT

## 2017-04-28 PROCEDURE — 82043 UR ALBUMIN QUANTITATIVE: CPT

## 2017-04-28 PROCEDURE — 36415 COLL VENOUS BLD VENIPUNCTURE: CPT

## 2017-04-28 PROCEDURE — 83036 HEMOGLOBIN GLYCOSYLATED A1C: CPT

## 2017-04-28 PROCEDURE — 80053 COMPREHEN METABOLIC PANEL: CPT

## 2017-05-02 DIAGNOSIS — R05.9 COUGH: ICD-10-CM

## 2017-05-02 RX ORDER — CODEINE PHOSPHATE/GUAIFENESIN 10-100MG/5
5 LIQUID (ML) ORAL 3 TIMES DAILY PRN
Qty: 120 ML | Refills: 0 | Status: SHIPPED
Start: 2017-05-02 | End: 2017-08-11

## 2017-05-22 RX ORDER — VALSARTAN 160 MG/1
TABLET ORAL
Qty: 90 TAB | Refills: 1 | Status: SHIPPED | OUTPATIENT
Start: 2017-05-22 | End: 2017-10-22 | Stop reason: SDUPTHER

## 2017-05-22 RX ORDER — VENLAFAXINE 75 MG/1
TABLET ORAL
Qty: 180 TAB | Refills: 0 | Status: SHIPPED | OUTPATIENT
Start: 2017-05-22 | End: 2018-01-28 | Stop reason: SDUPTHER

## 2017-05-22 NOTE — TELEPHONE ENCOUNTER
Refill X 6 months, sent to pharmacy.Pt. Seen in the last 6 months per protocol.   Lab Results   Component Value Date/Time    SODIUM 139 04/28/2017 07:32 AM    POTASSIUM 4.8 04/28/2017 07:32 AM    CHLORIDE 109 04/28/2017 07:32 AM    CO2 19* 04/28/2017 07:32 AM    GLUCOSE 152* 04/28/2017 07:32 AM    BUN 24* 04/28/2017 07:32 AM    CREATININE 0.72 04/28/2017 07:32 AM

## 2017-05-22 NOTE — TELEPHONE ENCOUNTER
Was the patient seen in the last year in this department? Yes     Does patient have an active prescription for medications requested? No     Received Request Via: Pharmacy      Pt met protocol?: Yes    LAST OV 04/27/2017    BP Readings from Last 1 Encounters:   04/27/17 120/80

## 2017-08-11 ENCOUNTER — OFFICE VISIT (OUTPATIENT)
Dept: MEDICAL GROUP | Facility: PHYSICIAN GROUP | Age: 52
End: 2017-08-11
Payer: COMMERCIAL

## 2017-08-11 VITALS
WEIGHT: 195 LBS | BODY MASS INDEX: 33.29 KG/M2 | RESPIRATION RATE: 16 BRPM | SYSTOLIC BLOOD PRESSURE: 122 MMHG | HEART RATE: 114 BPM | DIASTOLIC BLOOD PRESSURE: 78 MMHG | TEMPERATURE: 97.5 F | HEIGHT: 64 IN | OXYGEN SATURATION: 95 %

## 2017-08-11 DIAGNOSIS — J30.1 SEASONAL ALLERGIC RHINITIS DUE TO POLLEN: ICD-10-CM

## 2017-08-11 PROCEDURE — 99213 OFFICE O/P EST LOW 20 MIN: CPT | Performed by: FAMILY MEDICINE

## 2017-08-11 RX ORDER — TRIAMCINOLONE ACETONIDE 40 MG/ML
40 INJECTION, SUSPENSION INTRA-ARTICULAR; INTRAMUSCULAR ONCE
Status: COMPLETED | OUTPATIENT
Start: 2017-08-11 | End: 2017-08-11

## 2017-08-11 RX ADMIN — TRIAMCINOLONE ACETONIDE 40 MG: 40 INJECTION, SUSPENSION INTRA-ARTICULAR; INTRAMUSCULAR at 15:13

## 2017-08-11 NOTE — MR AVS SNAPSHOT
"        Thierry Dinh   2017 2:40 PM   Office Visit   MRN: [id number]    Department:  [facility] [place]   Dept Phone:  [phone]    Description:  Female : 1965   Provider:  Vivienne Nieves D.O.           Reason for Visit     Other Allergies      Allergies as of 2017     Allergen Noted Reactions    Nkda [No Known Drug Allergy] 2009         You were diagnosed with     Seasonal allergic rhinitis due to pollen   [4842023]         Vital Signs     Blood Pressure Pulse Temperature Respirations Height Weight    122/78 mmHg 114 36.4 °C (97.5 °F) 16 1.626 m (5' 4.02\") 88.451 kg (195 lb)    Body Mass Index Oxygen Saturation Smoking Status             33.46 kg/m2 95% Never Smoker          Basic Information     Date Of Birth Sex Race Ethnicity Preferred Language    1965 Female White Non- English      Problem List              ICD-10-CM Priority Class Noted - Resolved    Depression with anxiety F41.8   2009 - Present    Allergic rhinitis J30.9   2009 - Present    Hyperlipidemia with target LDL less than 100 E78.5   2010 - Present    Essential hypertension I10   2012 - Present    Fatigue R53.83   2012 - Present    Sleep related hypoxia G47.34   2012 - Present    Obstructive sleep apnea G47.33   10/12/2016 - Present    Hypersomnolence G47.10   10/12/2016 - Present    Insomnia G47.00   10/12/2016 - Present    IFG (impaired fasting glucose) R73.01   10/27/2016 - Present    Obesity (BMI 30.0-34.9) E66.9   10/27/2016 - Present    Body mass index 33.0-33.9, adult Z68.33   10/27/2016 - Present      Health Maintenance        Date Due Completion Dates    IMM INFLUENZA (1) 2017, 2015, 10/24/2011    MAMMOGRAM 2018, 2014, 2013, 1/3/2012, 2009, 2009    PAP SMEAR 2019, 2013, 2009    IMM DTaP/Tdap/Td Vaccine (2 - Td) 10/24/2021 10/24/2011    COLONOSCOPY 2026            Current " Immunizations     Hepatitis A Vaccine, Adult 3/10/2015, 7/1/2014    Hepatitis B Vaccine Recombivax (Adol/Adult) 3/10/2015, 8/1/2014, 7/1/2014    Influenza TIV (IM) 11/30/2015, 10/24/2011    Influenza Vaccine Quad Inj (Pf) 9/22/2016    MMR Vaccine 9/4/2014  4:35 PM, 7/1/2014    Tdap Vaccine 10/24/2011    Tuberculin Skin Test 9/16/2014 11:26 AM, 9/2/2014    Varicella Vaccine Live 8/1/2014, 7/1/2014      Below and/or attached are the medications your provider expects you to take. Review all of your home medications and newly ordered medications with your provider and/or pharmacist. Follow medication instructions as directed by your provider and/or pharmacist. Please keep your medication list with you and share with your provider. Update the information when medications are discontinued, doses are changed, or new medications (including over-the-counter products) are added; and carry medication information at all times in the event of emergency situations     Allergies:  NKDA - (reactions not documented)               Medications  Valid as of: August 11, 2017 -  3:19 PM    Generic Name Brand Name Tablet Size Instructions for use    Atorvastatin Calcium (Tab) LIPITOR 80 MG Take 1 Tab by mouth every evening.        Blood Glucose Monitoring Suppl (Misc) Blood Glucose Monitoring Suppl SUPPLIES Test Strips (brand as accepted by insurance)Sig: Use AM fasting.        DiazePAM (Tab) VALIUM 5 MG Take 1 Tab by mouth every 8 hours as needed for Anxiety. Followup visit for additional renewals        Lancet Devices (Misc) Lancet Device  MATCH TO CURRENT GLUCOMETER, To test fasting in the AM        MetFORMIN HCl (TABLET SR 24 HR) GLUCOPHAGE  MG TAKE ONE TABLET BY MOUTH ONCE DAILY        Misc. Devices (Kit) Misc. Devices  Glucometer (brand as accepted by insurance). To test fasting in the AM        Valsartan (Tab) DIOVAN 160 MG TAKE ONE TABLET BY MOUTH ONCE DAILY        Venlafaxine HCl (Tab) EFFEXOR 75 MG TAKE ONE TABLET BY MOUTH  TWICE DAILY WITH MEALS        Zolpidem Tartrate (Tab) AMBIEN 5 MG Take 1 Tab by mouth at bedtime as needed for Sleep.        .                 Medicines prescribed today were sent to:     [facility] [address] [place] [address] [place] - [address] [place] [address]    Phone: [phone] Fax: [phone]    Open 24 Hours?: No      Medication refill instructions:       If your prescription bottle indicates you have medication refills left, it is not necessary to call your provider’s office. Please contact your pharmacy and they will refill your medication.    If your prescription bottle indicates you do not have any refills left, you may request refills at any time through one of the following ways: The online [employer] system (except Urgent Care), by calling your provider’s office, or by asking your pharmacy to contact your provider’s office with a refill request. Medication refills are processed only during regular business hours and may not be available until the next business day. Your provider may request additional information or to have a follow-up visit with you prior to refilling your medication.   *Please Note: Medication refills are assigned a new Rx number when refilled electronically. Your pharmacy may indicate that no refills were authorized even though a new prescription for the same medication is available at the pharmacy. Please request the medicine by name with the pharmacy before contacting your provider for a refill.           [employer] Access Code: Activation code not generated  Current [employer] Status: Active

## 2017-08-11 NOTE — PROGRESS NOTES
Subjective:     Chief Complaint   Patient presents with   • Other     Allergies       Thierry Dinh is a 52 y.o. female here today for seasonal allergies. Pt usually treats with OTC sudafed, antihistamines. Patient states she is unable to do things she wants enjoyed due to continuous allergy symptoms. Pt reports itchy watery eyes, rhinorrhea with clear nasal discharge,  wheezing, sneezing, non-productive cough, and itchy/sore throat.  No fevers, no chills.             Allergies   Allergen Reactions   • Nkda [No Known Drug Allergy]      Current medicines (including changes today)  Current Outpatient Prescriptions   Medication Sig Dispense Refill   • venlafaxine (EFFEXOR) 75 MG Tab TAKE ONE TABLET BY MOUTH TWICE DAILY WITH MEALS 180 Tab 0   • valsartan (DIOVAN) 160 MG Tab TAKE ONE TABLET BY MOUTH ONCE DAILY 90 Tab 1   • zolpidem (AMBIEN) 5 MG Tab Take 1 Tab by mouth at bedtime as needed for Sleep. 30 Tab 3   • metformin ER (GLUCOPHAGE XR) 500 MG TABLET SR 24 HR TAKE ONE TABLET BY MOUTH ONCE DAILY 90 Tab 3   • Misc. Devices Kit Glucometer (brand as accepted by insurance). To test fasting in the AM 1 Kit 0   • Lancet Device Misc MATCH TO CURRENT GLUCOMETER, To test fasting in the  Each 4   • Blood Glucose Monitoring Suppl SUPPLIES Misc Test Strips (brand as accepted by insurance)Sig: Use AM fasting. 100 Each 4   • atorvastatin (LIPITOR) 80 MG tablet Take 1 Tab by mouth every evening. 90 Tab 4   • diazepam (VALIUM) 5 MG Tab Take 1 Tab by mouth every 8 hours as needed for Anxiety. Followup visit for additional renewals 60 Tab 2     No current facility-administered medications for this visit.     Social History   Substance Use Topics   • Smoking status: Never Smoker    • Smokeless tobacco: Never Used      Comment: avoid all tobacco products   • Alcohol Use: 1.2 oz/week     2 Glasses of wine per week      Comment: two per day     Family Status   Relation Status Death Age   • Mother Alive    • Father Alive    •  "Sister Alive    • Maternal Uncle Alive    • Paternal Aunt Alive    • Paternal Uncle Alive    • Maternal Grandmother     • Maternal Grandfather     • Brother Alive    • Maternal Aunt Alive    • Paternal Grandmother     • Paternal Grandfather       Family History   Problem Relation Age of Onset   • Hypertension Mother    • Diabetes Mother    • Stroke Mother    • Heart Disease Mother    • Stroke     • Genetic Father      Parkinsons   • Hypertension Father    • Hyperlipidemia Father    • Hypertension Sister    • Diabetes Maternal Uncle    • Diabetes Paternal Aunt    • Diabetes Paternal Uncle    • Genetic Maternal Grandmother      kidney failure   • Arthritis Maternal Grandfather    • Heart Disease Maternal Grandfather    • Sleep Apnea Neg Hx      She    has a past medical history of Anxiety; Psychiatric problem; Hiatus hernia syndrome; Heart burn; Pain; Hypertension; Hyperlipidemia; Sleep apnea; Chickenpox; Belarusian measles; Mumps; Influenza; and Tonsillitis.        ROS   Other    As per history of present illness. All others reviewed and negative    patient denies mood changes, anxiety or depression.       Objective:     Blood pressure 122/78, pulse 114, temperature 36.4 °C (97.5 °F), resp. rate 16, height 1.626 m (5' 4.02\"), weight 88.451 kg (195 lb), SpO2 95 %. Body mass index is 33.46 kg/(m^2).   Physical Exam:  Constitutional: Alert, no distress, patient laying flat when I entered the room to help control rhinorrhea.  Skin: Warm, dry, good turgor, no rashes in visible areas.  Eye: Equal, round and reactive, bilaterally injected conjunctiva, clear discharge, lids normal.  ENMT: Lips without lesions, oropharynx mildly erythematous with cobblestoning, no exudate, moist oral mucosa, bilateral tympanic membranes: No bulging, no retraction, no fluid, nonerythematous, positive light reflex, external auditory canals: Clear, scant cerumen, nonerythematous, bilateral nasal turbinates boggy and " edematous  Neck: Trachea midline, no masses, no thyromegaly. No cervical or supraclavicular lymphadenopathy. Full ROM  Respiratory: Unlabored respiratory effort, good air movement, lungs clear to auscultation, no wheezes, no ronchi.  Cardiovascular:RRR, +S1, S2, no murmur, no peripheral edema, pedal and radial pulses equal and intact bilat  Abdomen: Soft, non-tender, no masses, no hepatosplenomegaly.  Psych: Alert and oriented, appropriate affect and mood, Neuro: CN2-12 intact, no gross motor or sensory deficits      Assessment and Plan:   The following treatment plan was discussed    1. Seasonal allergic rhinitis due to pollen  Patient has tried multiple over-the-counter medications without any improvement. Due to severity of allergies recommend Kenalog. Risks and benefits of medication discussed including mood changes.  - triamcinolone acetonide (KENALOG-40) injection 40 mg; 1 mL by Intramuscular route Once.      Followup: Return if symptoms worsen or fail to improve.    Please note that this dictation was created using voice recognition software. I have made every reasonable attempt to correct obvious errors, but I expect that there are errors of grammar and possibly content that I did not discover before finalizing the note.

## 2017-10-24 RX ORDER — VALSARTAN 160 MG/1
TABLET ORAL
Qty: 90 TAB | Refills: 1 | Status: SHIPPED | OUTPATIENT
Start: 2017-10-24 | End: 2018-04-09 | Stop reason: SDUPTHER

## 2017-10-24 NOTE — TELEPHONE ENCOUNTER
Refill X 6 months, sent to pharmacy.Pt. Seen in the last 6 months per protocol.   Lab Results   Component Value Date/Time    SODIUM 139 04/28/2017 07:32 AM    POTASSIUM 4.8 04/28/2017 07:32 AM    CHLORIDE 109 04/28/2017 07:32 AM    CO2 19 (L) 04/28/2017 07:32 AM    GLUCOSE 152 (H) 04/28/2017 07:32 AM    BUN 24 (H) 04/28/2017 07:32 AM    CREATININE 0.72 04/28/2017 07:32 AM

## 2017-10-30 DIAGNOSIS — F41.8 DEPRESSION WITH ANXIETY: ICD-10-CM

## 2017-10-30 DIAGNOSIS — G44.209 TENSION HEADACHE: ICD-10-CM

## 2017-10-30 RX ORDER — DIAZEPAM 5 MG/1
5 TABLET ORAL EVERY 8 HOURS PRN
Qty: 60 TAB | Refills: 2 | Status: SHIPPED
Start: 2017-10-30 | End: 2019-07-26 | Stop reason: SDUPTHER

## 2017-10-30 NOTE — TELEPHONE ENCOUNTER
Was the patient seen in the last year in this department? Yes     Does patient have an active prescription for medications requested? No     Received Request Via: Pharmacy      Pt met protocol?: Yes pt last ov 8/2017 medication was last d/c 3/29/16 written by marcial pt is requesting a refill

## 2017-11-01 RX ORDER — NAPROXEN 500 MG/1
500 TABLET ORAL 2 TIMES DAILY WITH MEALS
Qty: 90 TAB | Refills: 0 | Status: SHIPPED | OUTPATIENT
Start: 2017-11-01 | End: 2018-06-20 | Stop reason: SDUPTHER

## 2018-01-28 DIAGNOSIS — G47.00 INSOMNIA, UNSPECIFIED TYPE: ICD-10-CM

## 2018-01-30 RX ORDER — ZOLPIDEM TARTRATE 5 MG/1
TABLET ORAL
Refills: 0 | OUTPATIENT
Start: 2018-01-30

## 2018-01-30 RX ORDER — VENLAFAXINE 75 MG/1
TABLET ORAL
Qty: 180 TAB | Refills: 0 | Status: SHIPPED | OUTPATIENT
Start: 2018-01-30 | End: 2018-07-20 | Stop reason: SDUPTHER

## 2018-01-30 NOTE — TELEPHONE ENCOUNTER
Was the patient seen in the last year in this department? Yes     Does patient have an active prescription for medications requested? No     Received Request Via: Pharmacy      Pt met protocol?: Yes, OV 8/17

## 2018-01-31 NOTE — TELEPHONE ENCOUNTER
Last seen by PCP 8/17. Will send 3 months to pharmacy. Patient is due for an appointment. Please schedule.

## 2018-02-16 ENCOUNTER — OFFICE VISIT (OUTPATIENT)
Dept: MEDICAL GROUP | Facility: PHYSICIAN GROUP | Age: 53
End: 2018-02-16
Payer: COMMERCIAL

## 2018-02-16 ENCOUNTER — HOSPITAL ENCOUNTER (OUTPATIENT)
Dept: LAB | Facility: MEDICAL CENTER | Age: 53
End: 2018-02-16
Attending: PHYSICIAN ASSISTANT
Payer: COMMERCIAL

## 2018-02-16 VITALS
WEIGHT: 199 LBS | HEART RATE: 95 BPM | SYSTOLIC BLOOD PRESSURE: 122 MMHG | DIASTOLIC BLOOD PRESSURE: 84 MMHG | OXYGEN SATURATION: 93 % | HEIGHT: 65 IN | TEMPERATURE: 99.9 F | BODY MASS INDEX: 33.15 KG/M2

## 2018-02-16 DIAGNOSIS — F41.8 DEPRESSION WITH ANXIETY: ICD-10-CM

## 2018-02-16 DIAGNOSIS — G47.00 INSOMNIA, UNSPECIFIED TYPE: ICD-10-CM

## 2018-02-16 DIAGNOSIS — M25.552 LEFT HIP PAIN: ICD-10-CM

## 2018-02-16 DIAGNOSIS — E11.69 HYPERLIPIDEMIA ASSOCIATED WITH TYPE 2 DIABETES MELLITUS (HCC): ICD-10-CM

## 2018-02-16 DIAGNOSIS — E78.5 HYPERLIPIDEMIA ASSOCIATED WITH TYPE 2 DIABETES MELLITUS (HCC): ICD-10-CM

## 2018-02-16 DIAGNOSIS — E11.42 TYPE 2 DIABETES MELLITUS WITH DIABETIC POLYNEUROPATHY, WITHOUT LONG-TERM CURRENT USE OF INSULIN (HCC): ICD-10-CM

## 2018-02-16 DIAGNOSIS — I10 ESSENTIAL HYPERTENSION: ICD-10-CM

## 2018-02-16 DIAGNOSIS — G47.33 OBSTRUCTIVE SLEEP APNEA: ICD-10-CM

## 2018-02-16 DIAGNOSIS — J30.1 CHRONIC SEASONAL ALLERGIC RHINITIS DUE TO POLLEN: ICD-10-CM

## 2018-02-16 LAB
ALBUMIN SERPL BCP-MCNC: 4.1 G/DL (ref 3.2–4.9)
ALBUMIN/GLOB SERPL: 1.5 G/DL
ALP SERPL-CCNC: 46 U/L (ref 30–99)
ALT SERPL-CCNC: 13 U/L (ref 2–50)
ANION GAP SERPL CALC-SCNC: 5 MMOL/L (ref 0–11.9)
AST SERPL-CCNC: 16 U/L (ref 12–45)
BILIRUB SERPL-MCNC: 0.9 MG/DL (ref 0.1–1.5)
BUN SERPL-MCNC: 13 MG/DL (ref 8–22)
CALCIUM SERPL-MCNC: 9 MG/DL (ref 8.5–10.5)
CHLORIDE SERPL-SCNC: 106 MMOL/L (ref 96–112)
CHOLEST SERPL-MCNC: 231 MG/DL (ref 100–199)
CO2 SERPL-SCNC: 26 MMOL/L (ref 20–33)
CREAT SERPL-MCNC: 0.67 MG/DL (ref 0.5–1.4)
CREAT UR-MCNC: 99.7 MG/DL
EST. AVERAGE GLUCOSE BLD GHB EST-MCNC: 140 MG/DL
GLOBULIN SER CALC-MCNC: 2.7 G/DL (ref 1.9–3.5)
GLUCOSE SERPL-MCNC: 135 MG/DL (ref 65–99)
HBA1C MFR BLD: 6.5 % (ref 0–5.6)
HDLC SERPL-MCNC: 42 MG/DL
LDLC SERPL CALC-MCNC: 132 MG/DL
MICROALBUMIN UR-MCNC: 0.7 MG/DL
MICROALBUMIN/CREAT UR: 7 MG/G (ref 0–30)
POTASSIUM SERPL-SCNC: 4.1 MMOL/L (ref 3.6–5.5)
PROT SERPL-MCNC: 6.8 G/DL (ref 6–8.2)
SODIUM SERPL-SCNC: 137 MMOL/L (ref 135–145)
TRIGL SERPL-MCNC: 286 MG/DL (ref 0–149)

## 2018-02-16 PROCEDURE — 82570 ASSAY OF URINE CREATININE: CPT

## 2018-02-16 PROCEDURE — 80053 COMPREHEN METABOLIC PANEL: CPT

## 2018-02-16 PROCEDURE — 80061 LIPID PANEL: CPT

## 2018-02-16 PROCEDURE — 99214 OFFICE O/P EST MOD 30 MIN: CPT | Performed by: PHYSICIAN ASSISTANT

## 2018-02-16 PROCEDURE — 36415 COLL VENOUS BLD VENIPUNCTURE: CPT

## 2018-02-16 PROCEDURE — 82043 UR ALBUMIN QUANTITATIVE: CPT

## 2018-02-16 PROCEDURE — 83036 HEMOGLOBIN GLYCOSYLATED A1C: CPT

## 2018-02-16 RX ORDER — METFORMIN HYDROCHLORIDE EXTENDED-RELEASE TABLETS 1000 MG/1
1 TABLET, FILM COATED, EXTENDED RELEASE ORAL DAILY
Qty: 90 TAB | Refills: 1 | Status: SHIPPED | OUTPATIENT
Start: 2018-02-16 | End: 2018-04-13

## 2018-02-16 RX ORDER — ZOLPIDEM TARTRATE 5 MG/1
5 TABLET ORAL NIGHTLY PRN
Qty: 90 TAB | Refills: 0 | Status: SHIPPED
Start: 2018-02-16 | End: 2018-04-13 | Stop reason: SDUPTHER

## 2018-02-16 ASSESSMENT — PATIENT HEALTH QUESTIONNAIRE - PHQ9: CLINICAL INTERPRETATION OF PHQ2 SCORE: 0

## 2018-02-16 NOTE — ASSESSMENT & PLAN NOTE
Follows with Dr. 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 Uses sleep appliance and states has been doing really well. Last AHI 5.2 per patient.

## 2018-02-16 NOTE — ASSESSMENT & PLAN NOTE
Patient states she has been having ongoing problems with left hip pain. Used to do Pilates 4 days/week but has been unable to lately due to hip pain. Currently taking PRN naproxen and follows with chiropractor which is helping. States has been seeing him a lot lately and plans to slowly re-start her Pilates routine.

## 2018-02-16 NOTE — ASSESSMENT & PLAN NOTE
Although she has had issues with both depression and anxiety in the past, she currently complains of recent increased anxiety, mostly work-induced. 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 Is taking Effexor 75 mg daily. States used to take BID but cut back. Thinking that she needs to go back up on dose.

## 2018-02-16 NOTE — ASSESSMENT & PLAN NOTE
Patient endorses chronic insomnia. She has been on Ambien 5 mg which she uses on an as-needed basis. Is not currently taking every night. She is requesting refills today.

## 2018-02-16 NOTE — ASSESSMENT & PLAN NOTE
Typically has problems in spring and summer. Has used Afrin, Sudafed, nasal washes, Flonase in the past which wasn't helping all that much.

## 2018-02-16 NOTE — ASSESSMENT & PLAN NOTE
Takes valsartan 160 mg daily. States her blood pressure has been under good control on this medication.

## 2018-02-16 NOTE — ASSESSMENT & PLAN NOTE
Patient states she was diagnosed with diabetes last year. She has been noticing worsening of her blood sugars lately which she is concerned about. Is currently taking metformin  mg daily. Her fasting blood sugars have consistently been running between 150s to 170s. She has started to notice some numbness and tingling in her feet with periodic sharp pains. She is motivated to get her diabetes under better control. Has plans to start exercising again. Expresses frustration at her inability to control her diet, as she lives with her parents who prepare the meals which are not the healthiest.

## 2018-02-16 NOTE — PROGRESS NOTES
Subjective:   Thierry Dinh is a 52 y.o. female here today for follow-up on diabetes, anxiety, insomnia, and other chronic conditions. Is a new patient to me and is also establishing care today.    Previous PCP: Vivienne Nieves,     HPI: Patient has the following current medical problems:    Depression with Anxiety  Although she has had issues with both depression and anxiety in the past, she currently complains of recent increased anxiety, mostly work-induced. 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 Is taking Effexor 75 mg daily. States used to take BID but cut back. Thinking that she needs to go back up on dose.    Obstructive sleep apnea  Follows with Dr. 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 Uses sleep appliance and states has been doing really well. Last AHI 5.2 per patient.    Hyperlipidemia associated with type 2 diabetes mellitus (CMS-HCC)  Is prescribed Lipitor 80 mg daily but patient states she stopped taking this. Not sure why she stopped. Would like cholesterol re-checked.    Allergic rhinitis  Typically has problems in spring and summer. Has used Afrin, Sudafed, nasal washes, Flonase in the past which wasn't helping all that much.    Left hip pain  Patient states she has been having ongoing problems with left hip pain. Used to do Pilates 4 days/week but has been unable to lately due to hip pain. Currently taking PRN naproxen and follows with chiropractor which is helping. States has been seeing him a lot lately and plans to slowly re-start her Pilates routine.     Type 2 diabetes mellitus with diabetic polyneuropathy, without long-term current use of insulin (CMS-HCC)  Patient states she was diagnosed with diabetes last year. She has been noticing worsening of her blood sugars lately which she is concerned about. Is currently taking metformin  mg daily. Her fasting blood sugars have consistently been running between 150s to 170s.  She has started to notice some numbness and tingling in her feet with periodic sharp pains. She is motivated to get her diabetes under better control. Has plans to start exercising again. Expresses frustration at her inability to control her diet, as she lives with her parents who prepare the meals which are not the healthiest.    Body mass index 33.0-33.9, adult  BMI 33.12.    Insomnia  Patient endorses chronic insomnia. She has been on Ambien 5 mg which she uses on an as-needed basis. Is not currently taking every night. She is requesting refills today.    Essential hypertension  Takes valsartan 160 mg daily. States her blood pressure has been under good control on this medication.       Current medicines (including changes today)  Current Outpatient Prescriptions   Medication Sig Dispense Refill   • zolpidem (AMBIEN) 5 MG Tab Take 1 Tab by mouth at bedtime as needed for Sleep for up to 90 days. 90 Tab 0   • Metformin HCl 1000 MG TABLET SR 24 HR Take 1 Tab by mouth every day. 90 Tab 1   • venlafaxine (EFFEXOR) 75 MG Tab TAKE ONE TABLET BY MOUTH TWICE DAILY WITH MEALS 180 Tab 0   • naproxen (NAPROSYN) 500 MG Tab Take 1 Tab by mouth 2 times a day, with meals. 90 Tab 0   • diazepam (VALIUM) 5 MG Tab Take 1 Tab by mouth every 8 hours as needed for Anxiety. Followup visit for additional renewals 60 Tab 2   • valsartan (DIOVAN) 160 MG Tab TAKE ONE TABLET BY MOUTH ONCE DAILY 90 Tab 1   • Lancet Device Misc MATCH TO CURRENT GLUCOMETER, To test fasting in the  Each 4   • Blood Glucose Monitoring Suppl SUPPLIES Misc Test Strips (brand as accepted by insurance)Sig: Use AM fasting. 100 Each 4   • Misc. Devices Kit Glucometer (brand as accepted by insurance). To test fasting in the AM 1 Kit 0   • atorvastatin (LIPITOR) 80 MG tablet Take 1 Tab by mouth every evening. 90 Tab 4     No current facility-administered medications for this visit.      She  has a past medical history of Anxiety; Chickenpox; Armenian measles; Heart  "burn; Hiatus hernia syndrome; Hyperlipidemia; Hypertension; Influenza; Mumps; Pain; Psychiatric problem; Sleep apnea; and Tonsillitis.      ROS  Pulmonary ROS: No shortness of breath  Cardiovascular ROS: No chest pain       Objective:     Blood pressure 122/84, pulse 95, temperature 37.7 °C (99.9 °F), height 1.651 m (5' 5\"), weight 90.3 kg (199 lb), SpO2 93 %. Body mass index is 33.12 kg/m².   Physical Exam:  Constitutional: Alert, well-appearing obese female in no distress.  Skin: No rashes in visible areas.  Eye: Pupils are equal and round, conjunctiva clear, lids normal.  ENMT: Lips without lesions, moist mucus membranes.  Respiratory: Unlabored respiratory effort, lungs clear to auscultation, no wheezes, no rhonchi.  Cardiovascular: Normal S1, S2, no murmur, no lower extremity edema.      Assessment and Plan:   The following treatment plan was discussed    1. Type 2 diabetes mellitus with diabetic polyneuropathy, without long-term current use of insulin (CMS-[facility])  Chronic issue, fasting blood sugars currently above goal. Last A1c in April 2017 which was 6.6. Advised patient to increase metformin dose to 1000 mg daily. Will have her follow-up in 3 months for diabetic care visit with labs prior to appointment.  - COMP METABOLIC PANEL; Future  - MICROALBUMIN CREAT RATIO URINE; Future  - HEMOGLOBIN A1C; Future  - Metformin HCl 1000 MG TABLET SR 24 HR; Take 1 Tab by mouth every day.  Dispense: 90 Tab; Refill: 1  - REFERRAL TO [facility]GR[facility] (HIP) Services Requested: Weight Management Program, Physician Medical Weight Management Program, Registered Dietitian for Medical Nutrition Therapy; Reason for Referral? BMI>30; Reason for Visit: Ove...; Future    2. Body mass index 33.0-33.9, adult  Patient counseled on importance of weight loss to improve her blood sugars and other chronic conditions. She is interested in Medical Weight Management program, so referral is placed.  - Patient identified as " having weight management issue.  Appropriate orders and counseling given.  - REFERRAL TO [facility] (HIP) Services Requested: Weight Management Program, Physician Medical Weight Management Program, Registered Dietitian for Medical Nutrition Therapy; Reason for Referral? BMI>30; Reason for Visit: Ove...; Future    3. Essential hypertension  Chronic issue, well-controlled on valsartan. Continue medication.  - COMP METABOLIC PANEL; Future  - REFERRAL TO [facility] (HIP) Services Requested: Weight Management Program, Physician Medical Weight Management Program, Registered Dietitian for Medical Nutrition Therapy; Reason for Referral? BMI>30; Reason for Visit: Ove...; Future    4. Depression with anxiety  Chronic issue, anxiety currently uncontrolled. Patient advised to increase her Effexor back to BID dosing.     5. Obstructive sleep apnea  Chronic issue, per patient well-controlled. No records available.  - REFERRAL TO [facility] (HIP) Services Requested: Weight Management Program, Physician Medical Weight Management Program, Registered Dietitian for Medical Nutrition Therapy; Reason for Referral? BMI>30; Reason for Visit: Ove...; Future    6. Insomnia, unspecified type  Chronic issue, well-controlled with PRN Ambien. Refills ordered.  - zolpidem (AMBIEN) 5 MG Tab; Take 1 Tab by mouth at bedtime as needed for Sleep for up to 90 days.  Dispense: 90 Tab; Refill: 0    7. Hyperlipidemia associated with type 2 diabetes mellitus (CMS-HCC)  Chronic issue. Last lipid panel from 10/2016 reviewed which showed elevation of total cholesterol, triglycerides, and LDL. Goal LDL < 70 given diabetes. Patient advised to have lipids re-checked and to re-start high-dose atorvastatin.    Cholesterol,Tot 211   100 - 199 mg/dL Final   Triglycerides 159   0 - 149 mg/dL Final   HDL 44  >=40 mg/dL Final      <100 mg/dL Final     - LIPID PROFILE; Future  - REFERRAL TO  [facility] IMPROVEMENT PROGRAMS (HIP) Services Requested: Weight Management Program, Physician Medical Weight Management Program, Registered Dietitian for Medical Nutrition Therapy; Reason for Referral? BMI>30; Reason for Visit: Ove...; Future    8. Chronic seasonal allergic rhinitis due to pollen  Chronic issue, not currently having significant issues. Patient advised to take daily antihistamine in the spring/summer when her allergy symptoms flare.    9. Left hip pain  Chronic issue, improved with chiropractic care. Continue naproxen as needed for pain. Has plans to re-start Pilates.      Followup: Return in about 3 months (around 5/16/2018) for DCV.    Michelle Kearns P.A.-C.

## 2018-02-16 NOTE — ASSESSMENT & PLAN NOTE
Is prescribed Lipitor 80 mg daily but patient states she stopped taking this. Not sure why she stopped. Would like cholesterol re-checked.

## 2018-02-22 DIAGNOSIS — R73.03 PRE-DIABETES: ICD-10-CM

## 2018-02-22 DIAGNOSIS — E78.5 HYPERLIPIDEMIA WITH TARGET LDL LESS THAN 100: ICD-10-CM

## 2018-02-23 RX ORDER — ATORVASTATIN CALCIUM 80 MG/1
TABLET, FILM COATED ORAL
Qty: 90 TAB | Refills: 1 | Status: SHIPPED | OUTPATIENT
Start: 2018-02-23 | End: 2018-05-31

## 2018-02-23 NOTE — TELEPHONE ENCOUNTER
Was the patient seen in the last year in this department? Yes     Does patient have an active prescription for medications requested? No     Received Request Via: Pharmacy      Pt met protocol?: Yes    LAST OV 02/16/2018    BP Readings from Last 1 Encounters:   02/16/18 122/84       Lab Results  Component Value Date/Time   HBA1C 6.5 (H) 02/16/2018 1047       Lab Results  Component Value Date/Time   CHOLSTRLTOT 231 (H) 02/16/2018 1047       Lab Results  Component Value Date/Time   TRIGLYCERIDE 286 (H) 02/16/2018 1047       Lab Results  Component Value Date/Time   HDL 42 02/16/2018 1047       Lab Results  Component Value Date/Time    (H) 02/16/2018 1047

## 2018-02-23 NOTE — TELEPHONE ENCOUNTER
Pt has had OV within the 12 month protocol and lipid panel is current. 6 month supply sent to pharmacy. Pt to restart med as she had stopped taking it.   Lab Results   Component Value Date/Time    CHOLSTRLTOT 231 (H) 02/16/2018 10:47 AM     (H) 02/16/2018 10:47 AM    HDL 42 02/16/2018 10:47 AM    TRIGLYCERIDE 286 (H) 02/16/2018 10:47 AM       Lab Results   Component Value Date/Time    SODIUM 137 02/16/2018 10:47 AM    POTASSIUM 4.1 02/16/2018 10:47 AM    CHLORIDE 106 02/16/2018 10:47 AM    CO2 26 02/16/2018 10:47 AM    GLUCOSE 135 (H) 02/16/2018 10:47 AM    BUN 13 02/16/2018 10:47 AM    CREATININE 0.67 02/16/2018 10:47 AM     Lab Results   Component Value Date/Time    ALKPHOSPHAT 46 02/16/2018 10:47 AM    ASTSGOT 16 02/16/2018 10:47 AM    ALTSGPT 13 02/16/2018 10:47 AM    TBILIRUBIN 0.9 02/16/2018 10:47 AM

## 2018-04-10 RX ORDER — VALSARTAN 160 MG/1
TABLET ORAL
Qty: 90 TAB | Refills: 0 | Status: SHIPPED | OUTPATIENT
Start: 2018-04-10 | End: 2018-04-13 | Stop reason: SDUPTHER

## 2018-04-12 ENCOUNTER — TELEPHONE (OUTPATIENT)
Dept: MEDICAL GROUP | Facility: PHYSICIAN GROUP | Age: 53
End: 2018-04-12

## 2018-04-12 NOTE — TELEPHONE ENCOUNTER
Future Appointments       Provider Department Center    4/13/2018 5:40 PM Dolly Newby M.D. [facility]    5/29/2018 2:25 PM Michelle Kearns P.A.-C.; [place] [facility] RN [facility]        ESTABLISHED PATIENT PRE-VISIT PLANNING     Note: Patient will not be contacted if there is no indication to call.     1.  Reviewed notes from the last few office visits within the medical group: Yes 02/16/2018    2.  If any orders were placed at last visit or intended to be done for this visit (i.e. 6 mos follow-up), do we have Results/Consult Notes?        •  Labs - Labs were not ordered at last office visit.       •  Imaging - Imaging was not ordered at last office visit.       •  Referrals - Referral ordered, patient has NOT been seen.    3. Is this appointment scheduled as a Hospital Follow-Up? No    4.  Immunizations were updated in Epic using WebIZ?: Yes       •  Web Iz Recommendations: TD    5.  Patient is due for the following Health Maintenance Topics:   Health Maintenance Due   Topic Date Due   • DIABETES MONOFILAMENT / LE EXAM  01/12/1967   • RETINAL SCREENING  07/12/1984   • IMM PNEUMOCOCCAL 19-64 (ADULT) MEDIUM RISK SERIES (1 of 1 - PPSV23) 07/12/1985         6.  MDX printed for Provider? NO INS [facility]    7.  Patient was informed to arrive 15 min prior to their scheduled appointment and bring in their medication bottles. Confirmed through automated call

## 2018-04-13 ENCOUNTER — OFFICE VISIT (OUTPATIENT)
Dept: MEDICAL GROUP | Facility: PHYSICIAN GROUP | Age: 53
End: 2018-04-13
Payer: COMMERCIAL

## 2018-04-13 VITALS
HEIGHT: 65 IN | WEIGHT: 199 LBS | TEMPERATURE: 98.2 F | DIASTOLIC BLOOD PRESSURE: 70 MMHG | OXYGEN SATURATION: 96 % | BODY MASS INDEX: 33.15 KG/M2 | HEART RATE: 110 BPM | SYSTOLIC BLOOD PRESSURE: 122 MMHG

## 2018-04-13 DIAGNOSIS — Z23 NEED FOR VACCINATION: ICD-10-CM

## 2018-04-13 DIAGNOSIS — E66.9 CLASS 1 OBESITY WITH BODY MASS INDEX (BMI) OF 33.0 TO 33.9 IN ADULT, UNSPECIFIED OBESITY TYPE, UNSPECIFIED WHETHER SERIOUS COMORBIDITY PRESENT: ICD-10-CM

## 2018-04-13 DIAGNOSIS — E78.5 HYPERLIPIDEMIA ASSOCIATED WITH TYPE 2 DIABETES MELLITUS (HCC): ICD-10-CM

## 2018-04-13 DIAGNOSIS — F41.8 DEPRESSION WITH ANXIETY: ICD-10-CM

## 2018-04-13 DIAGNOSIS — F51.01 PRIMARY INSOMNIA: ICD-10-CM

## 2018-04-13 DIAGNOSIS — Z00.00 HEALTHCARE MAINTENANCE: ICD-10-CM

## 2018-04-13 DIAGNOSIS — E11.69 HYPERLIPIDEMIA ASSOCIATED WITH TYPE 2 DIABETES MELLITUS (HCC): ICD-10-CM

## 2018-04-13 DIAGNOSIS — J30.1 CHRONIC SEASONAL ALLERGIC RHINITIS DUE TO POLLEN: ICD-10-CM

## 2018-04-13 DIAGNOSIS — G47.00 INSOMNIA, UNSPECIFIED TYPE: ICD-10-CM

## 2018-04-13 DIAGNOSIS — I10 ESSENTIAL HYPERTENSION: ICD-10-CM

## 2018-04-13 DIAGNOSIS — E11.42 TYPE 2 DIABETES MELLITUS WITH DIABETIC POLYNEUROPATHY, WITHOUT LONG-TERM CURRENT USE OF INSULIN (HCC): Primary | ICD-10-CM

## 2018-04-13 PROCEDURE — 90471 IMMUNIZATION ADMIN: CPT | Performed by: INTERNAL MEDICINE

## 2018-04-13 PROCEDURE — 96372 THER/PROPH/DIAG INJ SC/IM: CPT | Performed by: INTERNAL MEDICINE

## 2018-04-13 PROCEDURE — 99204 OFFICE O/P NEW MOD 45 MIN: CPT | Mod: 25 | Performed by: INTERNAL MEDICINE

## 2018-04-13 PROCEDURE — 90472 IMMUNIZATION ADMIN EACH ADD: CPT | Performed by: INTERNAL MEDICINE

## 2018-04-13 PROCEDURE — 90686 IIV4 VACC NO PRSV 0.5 ML IM: CPT | Performed by: INTERNAL MEDICINE

## 2018-04-13 PROCEDURE — 90732 PPSV23 VACC 2 YRS+ SUBQ/IM: CPT | Performed by: INTERNAL MEDICINE

## 2018-04-13 RX ORDER — TRIAMCINOLONE ACETONIDE 40 MG/ML
40 INJECTION, SUSPENSION INTRA-ARTICULAR; INTRAMUSCULAR ONCE
Status: COMPLETED | OUTPATIENT
Start: 2018-04-13 | End: 2018-04-13

## 2018-04-13 RX ORDER — VALSARTAN 160 MG/1
TABLET ORAL
Qty: 90 TAB | Refills: 0 | Status: SHIPPED | OUTPATIENT
Start: 2018-04-13 | End: 2018-07-24 | Stop reason: RX

## 2018-04-13 RX ORDER — ZOLPIDEM TARTRATE 5 MG/1
5 TABLET ORAL NIGHTLY PRN
Qty: 90 TAB | Refills: 0 | Status: SHIPPED | OUTPATIENT
Start: 2018-04-13 | End: 2018-12-13 | Stop reason: SDUPTHER

## 2018-04-13 RX ADMIN — TRIAMCINOLONE ACETONIDE 40 MG: 40 INJECTION, SUSPENSION INTRA-ARTICULAR; INTRAMUSCULAR at 16:21

## 2018-04-13 NOTE — ASSESSMENT & PLAN NOTE
Colonoscopy done last year and good for next 10 years. Scheduled for a mammogram next month. Pap smear done 2 years back she is due for next year. To get the records it was normal. Did not get a flu shot this year, also due for a pneumococcal shot was given in office today.

## 2018-04-13 NOTE — LETTER
[employer] [facility]  Michelle Kearns P.A.-C.  [address]  [place] [address]  Fax: [phone]   Authorization for Release/Disclosure of   Protected Health Information   Name: THIERRY BELLA : 1965 SSN: [id number]   Address: [address] [facility] [place] [address] Phone:    [phone] (home)    I authorize the entity listed below to release/disclose the PHI below to:   [facility]/Michelle Kearns P.A.-C. and Dolly Newby M.D.   Provider or Entity Name:  [facility]   Address   City, [facility]   Phone:      Fax:     Reason for request: continuity of care   Information to be released:    [  ] LAST COLONOSCOPY,  including any PATH REPORT and follow-up  [  ] LAST FIT/COLOGUARD RESULT [  ] LAST DEXA  [  ] LAST MAMMOGRAM  [  ] LAST PAP  [  ] LAST LABS [ xxxxx ] RETINA EXAM REPORT  [  ] IMMUNIZATION RECORDS  [  ] Release all info      [  ] Check here and initial the line next to each item to release ALL health information INCLUDING  _____ Care and treatment for drug and / or alcohol abuse  _____ HIV testing, infection status, or AIDS  _____ Genetic Testing    DATES OF SERVICE OR TIME PERIOD TO BE DISCLOSED: _____________  I understand and acknowledge that:  * This Authorization may be revoked at any time by you in writing, except if your health information has already been used or disclosed.  * Your health information that will be used or disclosed as a result of you signing this authorization could be re-disclosed by the recipient. If this occurs, your re-disclosed health information may no longer be protected by State or Federal laws.  * You may refuse to sign this Authorization. Your refusal will not affect your ability to obtain treatment.  * This Authorization becomes effective upon signing and will  on (date) __________.      If no date is indicated, this Authorization will  one (1) year from the signature date.    Name: Thierry Bella    Signature:   Date:       4/13/2018       PLEASE FAX REQUESTED RECORDS BACK TO: [phone]

## 2018-04-13 NOTE — ASSESSMENT & PLAN NOTE
This is a chronic health problem that is uncontrolled with current medications and lifestyle measures. Last lipid panel done in February 2018 was not within normal limits, though she was not taking atorvastatin 80 mg daily currently on it regularly now.

## 2018-04-13 NOTE — ASSESSMENT & PLAN NOTE
This is a chronic health problem that is well controlled with current medications and lifestyle measures. BP in office today for 122/70. Currently on medication [id number] mg daily.

## 2018-04-13 NOTE — ASSESSMENT & PLAN NOTE
This is a chronic health problem that is well controlled with current medications and lifestyle measures. On Ambien 5 mg when necessary as she takes around 1-2 times a week

## 2018-04-13 NOTE — ASSESSMENT & PLAN NOTE
This is a chronic health problem that is uncontrolled with current medications and lifestyle measures.Has yearly kenalog shot had one last year and will get one today. Currently on Flonase nasal spray for an nasal spray Benadryl when necessary and also on Claritin daily..

## 2018-04-13 NOTE — PROGRESS NOTES
CC:  To establish care with new PCP, diabetes mellitus.    HISTORY OF THE PRESENT ILLNESS: Patient is a 52 y.o. female. This pleasant patient is here today to establish care with a PCP and discuss on her medical problems as mentioned in HPI  below.    Health Maintenance: Completed      Allergic rhinitis  This is a chronic health problem that is uncontrolled with current medications and lifestyle measures.Has yearly kenalog shot had one last year and will get one today. Currently on Flonase nasal spray for an nasal spray Benadryl when necessary and also on Claritin daily..    Type 2 diabetes mellitus with diabetic polyneuropathy, without long-term current use of insulin (CMS-Formerly [facility])  This is a chronic health problem that is well controlled with current medications and lifestyle measures. As diagnosed one year back and was started on metformin 500 mg once day later increased to thousand milligrams daily. Checks blood sugar everyday which ranges between 161-170 in fasting. Expressing that after the increase in the dose she has developed more of a rash with also developing gastritis.    Hyperlipidemia associated with type 2 diabetes mellitus (CMS-[facility])  This is a chronic health problem that is uncontrolled with current medications and lifestyle measures. Last lipid panel done in February 2018 was not within normal limits, though she was not taking atorvastatin 80 mg daily currently on it regularly now.    Depression with Anxiety  This is a chronic health problem that is well controlled with current medications and lifestyle measures. Currently on venlafaxine 75 mg daily and also on diazepam 5 mg around 1-2  times a week. Currently has 2 more refills so don't need it at this time.    Insomnia  This is a chronic health problem that is well controlled with current medications and lifestyle measures. On Ambien 5 mg when necessary as she takes around 1-2 times a week    Essential hypertension  This is a chronic health problem  that is well controlled with current medications and lifestyle measures. BP in office today for 122/70. Currently on medication [id number] mg daily.    Healthcare maintenance  Colonoscopy done last year and good for next 10 years. Scheduled for a mammogram next month. Pap smear done 2 years back she is due for next year. To get the records it was normal. Did not get a flu shot this year, also due for a pneumococcal shot was given in office today.    PHQ score 0, BMI > 33 , no tobacco, no fall injuries    Allergies: Nkda [no known drug allergy]    Current Outpatient Prescriptions Ordered in [facility]   Medication Sig Dispense Refill   • valsartan (DIOVAN) 160 MG Tab TAKE ONE TABLET BY MOUTH ONCE DAILY 90 Tab 0   • zolpidem (AMBIEN) 5 MG Tab Take 1 Tab by mouth at bedtime as needed for Sleep for up to 90 days. 90 Tab 0   • SitaGLIPtin-MetFORMIN HCl (JANUMET XR)  MG TABLET SR 24 HR Take 1 Tab by mouth every day. 30 Tab 4   • atorvastatin (LIPITOR) 80 MG tablet TAKE ONE TABLET BY MOUTH IN THE EVENING 90 Tab 1   • venlafaxine (EFFEXOR) 75 MG Tab TAKE ONE TABLET BY MOUTH TWICE DAILY WITH MEALS 180 Tab 0   • naproxen (NAPROSYN) 500 MG Tab Take 1 Tab by mouth 2 times a day, with meals. 90 Tab 0   • diazepam (VALIUM) 5 MG Tab Take 1 Tab by mouth every 8 hours as needed for Anxiety. Followup visit for additional renewals 60 Tab 2   • Misc. Devices Kit Glucometer (brand as accepted by insurance). To test fasting in the AM 1 Kit 0   • Lancet Device Misc MATCH TO CURRENT GLUCOMETER, To test fasting in the  Each 4   • Blood Glucose Monitoring Suppl SUPPLIES Misc Test Strips (brand as accepted by insurance)Sig: Use AM fasting. 100 Each 4     Current Facility-Administered Medications Ordered in Epic   Medication Dose Route Frequency Provider Last Rate Last Dose   • triamcinolone acetonide (KENALOG-40) injection 40 mg  40 mg Intramuscular Once Dolly Newby M.D.           Past Medical History:   Diagnosis Date   •  Anxiety    • Chickenpox    • [place] measles    • Heart burn    • Hiatus hernia syndrome    • Hyperlipidemia    • Hypertension    • Influenza    • Mumps    • Pain     stomach   • Psychiatric problem     depression   • Sleep apnea    • Tonsillitis        Past Surgical History:   Procedure Laterality Date   • HIATAL HERNIA REPAIR  6/16/2011    Performed by RAMSES GALVEZ at SURGERY [facility] ORS   • LAPAROSCOPY  6/16/2011    Performed by RAMSES GALVEZ at SURGERY [facility] ORS   • LAPAROSCOPY  4/4/2011    Lap band revision:Performed by RAMSES GALVEZ at [facility] ORS   • COLONOSCOPY  2008   • GASTRIC BANDING LAPAROSCOPIC  10/2007   • OTHER SURGICAL PROCEDURE  2003    uterine ablation   • TUBAL COAGULATION LAPAROSCOPIC BILATERAL  1995   • ENDOMETRIAL ABLATION         Social History   Substance Use Topics   • Smoking status: Never Smoker   • Smokeless tobacco: Never Used      Comment: avoid all tobacco products   • Alcohol use 1.2 oz/week     2 Glasses of wine per week      Comment: two per day       Social History     Social History Narrative   • No narrative on file       Family History   Problem Relation Age of Onset   • Hypertension Mother    • Diabetes Mother    • Stroke Mother    • Heart Disease Mother    • Genetic Father      Parkinsons   • Hypertension Father    • Hyperlipidemia Father    • Hypertension Sister    • Diabetes Maternal Uncle    • Diabetes Paternal Aunt    • Diabetes Paternal Uncle    • Genetic Maternal Grandmother      kidney failure   • Arthritis Maternal Grandfather    • Heart Disease Maternal Grandfather    • Stroke     • Sleep Apnea Neg Hx        ROS:     - Constitutional: Negative for fever, chills, unexpected weight change, and fatigue/generalized weakness.     - HEENT: Positive for rhinorrhea, sinus congestion, Negative for headaches, vision changes, hearing changes, ear pain, ear discharge, sore throat, and neck pain.      - Respiratory: Negative for cough, sputum  "production, chest congestion, dyspnea, wheezing, and crackles.      - Cardiovascular: Negative for chest pain, palpitations, orthopnea, and bilateral lower extremity edema.     - Gastrointestinal: Negative for heartburn, nausea, vomiting, abdominal pain, hematochezia, melena, diarrhea, constipation, and greasy/foul-smelling stools.     - Genitourinary: Negative for dysuria, polyuria, hematuria, pyuria, urinary urgency, and urinary incontinence.     - Musculoskeletal: Negative for myalgias, back pain, and joint pain.     - Skin: Negative for rash, itching, cyanotic skin color change.     - Neurological: Negative for dizziness, tingling, tremors, focal sensory deficit, focal weakness and headaches.     - Endo/Heme/Allergies: Does not bruise/bleed easily.     - Psychiatric/Behavioral: Negative for depression, suicidal/homicidal ideation and memory loss.      Last level done in February 2018 and reviewed and discussed with the patient.    Exam: Blood pressure 122/70, pulse (!) 110, temperature 36.8 °C (98.2 °F), height 1.651 m (5' 5\"), weight 90.3 kg (199 lb), SpO2 96 %. Body mass index is 33.12 kg/m².    General: Normal appearing. No distress.  HEENT: Normocephalic. Eyes conjunctiva clear lids without ptosis, pupils equal and reactive to light accommodation, ears normal shape and contour, canals are clear bilaterally, tympanic membranes are benign, nasal mucosa benign, oropharynx is without erythema, edema or exudates.   Neck: Supple without JVD or bruit. Thyroid is not enlarged.  Pulmonary: Clear to ausculation.  Normal effort. No rales, ronchi, or wheezing.  Cardiovascular: Positive for tachycardia Regular rate and rhythm without murmur. Carotid and radial pulses are intact and equal bilaterally.  Abdomen: Soft, nontender, nondistended. Normal bowel sounds. Liver and spleen are not palpable  Neurologic: Grossly nonfocal  Lymph: No cervical, supraclavicular or axillary lymph nodes are palpable  Skin: Warm and dry.  No " obvious lesions.  Musculoskeletal: Normal gait. No extremity cyanosis, clubbing, or edema.  Psych: Normal mood and affect. Alert and oriented x3. Judgment and insight is normal.      Please note that this dictation was created using voice recognition software. I have made every reasonable attempt to correct obvious errors, but I expect that there are errors of grammar and possibly content that I did not discover before finalizing the note.      Assessment/Plan  1. Need for vaccination  Patient is due for her flu shot this year and also PPSV23 will be given in office today.  - PNEUMOCOCCAL POLYSACCHARIDE VACCINE 23-VALENT =>1YO SQ/IM  - INFLUENZA VACCINE QUAD INJ >3Y(PF)    2. Chronic seasonal allergic rhinitis due to pollen  Patient has yearly Kenalog shots for her seasonal allergies, currently she has again flared up and requesting for a Kenalog shot which she took last year. We will give today.  - triamcinolone acetonide (KENALOG-40) injection 40 mg; 1 mL by Intramuscular route Once.    3. Type 2 diabetes mellitus with diabetic polyneuropathy, without long-term current use of insulin (CMS-HCC)  Patient was having increasing gastritis after metformin was increased in dose from 500 to 1000 last time by Dr. Mckeon, requesting to cut down on it will start on Janumet given her daily blood glucose being high though her A1c is within normal range. She got her eye check done will need to get the records from ophthalmology, due for foot exam will do it in the office today.  - SitaGLIPtin-MetFORMIN HCl (JANUMET XR)  MG TABLET SR 24 HR; Take 1 Tab by mouth every day.  Dispense: 30 Tab; Refill: 4  - Diabetic Monofilament Lower Extremity Exam    4. Hyperlipidemia associated with type 2 diabetes mellitus (CMS-HCC)  Continue the current medication atorvastatin 80 mg daily.    5. Depression with anxiety  Continue the current medication venlafaxine daily and also asking for refills of diazepam when necessary but when I  checked  she still has 2 refills and do not need it anymore at this time.    6. Primary insomnia  Continue Ambien 5 mg daily at bedtime when necessary  - zolpidem (AMBIEN) 5 MG Tab; Take 1 Tab by mouth at bedtime as needed for Sleep for up to 90 days.  Dispense: 90 Tab; Refill: 0    8. Essential hypertension  Continue valsartan 160 mg daily, given the tachycardia which patient says it has been chronic and also her TSH levels have always been normal, I would consider to start on a beta blocker in her next visit if it still remains tachycardic. Reviewed her previous EKGs.  - valsartan (DIOVAN) 160 MG Tab; TAKE ONE TABLET BY MOUTH ONCE DAILY  Dispense: 90 Tab; Refill: 0    9. Class 1 obesity with body mass index (BMI) of 33.0 to 33.9 in adult, unspecified obesity type, unspecified whether serious comorbidity present  Pt educated on the increase of morbidity and mortality associated with excess weight including DM, Heart Disease, HTN, stroke, and sleep apnea.  Pt advised weight loss of 5% through reduced calorie, low fat diet and 150 mins of exercise a week  Recommend obesity clinic if patient is unsuccessful with weight loss

## 2018-04-13 NOTE — ASSESSMENT & PLAN NOTE
This is a chronic health problem that is well controlled with current medications and lifestyle measures. Currently on venlafaxine 75 mg daily and also on diazepam 5 mg around 1-2  times a week. Currently has 2 more refills so don't need it at this time.

## 2018-04-13 NOTE — ASSESSMENT & PLAN NOTE
This is a chronic health problem that is well controlled with current medications and lifestyle measures. As diagnosed one year back and was started on metformin 500 mg once day later increased to thousand milligrams daily. Checks blood sugar everyday which ranges between 161-170 in fasting. Expressing that after the increase in the dose she has developed more of a rash with also developing gastritis.

## 2018-04-17 ENCOUNTER — OFFICE VISIT (OUTPATIENT)
Dept: URGENT CARE | Facility: PHYSICIAN GROUP | Age: 53
End: 2018-04-17
Payer: COMMERCIAL

## 2018-04-17 VITALS
DIASTOLIC BLOOD PRESSURE: 82 MMHG | TEMPERATURE: 97.7 F | HEART RATE: 108 BPM | WEIGHT: 195 LBS | OXYGEN SATURATION: 95 % | SYSTOLIC BLOOD PRESSURE: 134 MMHG | BODY MASS INDEX: 31.34 KG/M2 | HEIGHT: 66 IN

## 2018-04-17 DIAGNOSIS — N30.01 ACUTE CYSTITIS WITH HEMATURIA: ICD-10-CM

## 2018-04-17 DIAGNOSIS — R39.9 SYMPTOMS INVOLVING URINARY SYSTEM: ICD-10-CM

## 2018-04-17 LAB
APPEARANCE UR: CLEAR
BILIRUB UR STRIP-MCNC: NEGATIVE MG/DL
COLOR UR AUTO: YELLOW
GLUCOSE UR STRIP.AUTO-MCNC: NEGATIVE MG/DL
KETONES UR STRIP.AUTO-MCNC: NEGATIVE MG/DL
LEUKOCYTE ESTERASE UR QL STRIP.AUTO: NEGATIVE
NITRITE UR QL STRIP.AUTO: NEGATIVE
PH UR STRIP.AUTO: 5 [PH] (ref 5–8)
PROT UR QL STRIP: 30 MG/DL
RBC UR QL AUTO: NORMAL
SP GR UR STRIP.AUTO: 1.02
UROBILINOGEN UR STRIP-MCNC: NEGATIVE MG/DL

## 2018-04-17 PROCEDURE — 99213 OFFICE O/P EST LOW 20 MIN: CPT | Performed by: EMERGENCY MEDICINE

## 2018-04-17 PROCEDURE — 81002 URINALYSIS NONAUTO W/O SCOPE: CPT | Performed by: EMERGENCY MEDICINE

## 2018-04-17 RX ORDER — SULFAMETHOXAZOLE AND TRIMETHOPRIM 800; 160 MG/1; MG/1
1 TABLET ORAL EVERY 12 HOURS
Qty: 6 TAB | Refills: 0 | Status: SHIPPED | OUTPATIENT
Start: 2018-04-17 | End: 2018-04-20

## 2018-04-17 ASSESSMENT — ENCOUNTER SYMPTOMS
ABDOMINAL PAIN: 0
CHILLS: 0
FEVER: 0
NAUSEA: 0
FLANK PAIN: 0
ANOREXIA: 0
CHANGE IN BOWEL HABIT: 0
VOMITING: 0

## 2018-04-18 NOTE — PROGRESS NOTES
Subjective:      Thierry Dinh is a 52 y.o. female who presents with Dysuria (Pressure and pain in bladder area onset today )            UTI   This is a new problem. The current episode started today. The problem occurs constantly. The problem has been gradually worsening. Associated symptoms include urinary symptoms. Pertinent negatives include no abdominal pain, anorexia, change in bowel habit, chills, fever, nausea, rash or vomiting. She has tried drinking and acetaminophen for the symptoms. The treatment provided moderate relief.       Review of Systems   Constitutional: Negative for chills and fever.   Gastrointestinal: Negative for abdominal pain, anorexia, change in bowel habit, nausea and vomiting.   Genitourinary: Positive for dysuria, frequency and urgency. Negative for flank pain and hematuria.        No vaginal discharge or bleeding.  Post-menopausal   Skin: Negative for rash.     PMH:  has a past medical history of Anxiety; Chickenpox; [place] measles; Heart burn; Hiatus hernia syndrome; Hyperlipidemia; Hypertension; Influenza; Mumps; Pain; Psychiatric problem; Sleep apnea; and Tonsillitis.  MEDS:   Current Outpatient Prescriptions:   •  sulfamethoxazole-trimethoprim (BACTRIM DS) 800-160 MG tablet, Take 1 Tab by mouth every 12 hours for 3 days., Disp: 6 Tab, Rfl: 0  •  valsartan (DIOVAN) 160 MG Tab, TAKE ONE TABLET BY MOUTH ONCE DAILY, Disp: 90 Tab, Rfl: 0  •  zolpidem (AMBIEN) 5 MG Tab, Take 1 Tab by mouth at bedtime as needed for Sleep for up to 90 days., Disp: 90 Tab, Rfl: 0  •  atorvastatin (LIPITOR) 80 MG tablet, TAKE ONE TABLET BY MOUTH IN THE EVENING, Disp: 90 Tab, Rfl: 1  •  venlafaxine (EFFEXOR) 75 MG Tab, TAKE ONE TABLET BY MOUTH TWICE DAILY WITH MEALS, Disp: 180 Tab, Rfl: 0  •  naproxen (NAPROSYN) 500 MG Tab, Take 1 Tab by mouth 2 times a day, with meals., Disp: 90 Tab, Rfl: 0  •  diazepam (VALIUM) 5 MG Tab, Take 1 Tab by mouth every 8 hours as needed for Anxiety. Followup visit for  "additional renewals, Disp: 60 Tab, Rfl: 2  •  Misc. Devices Kit, Glucometer (brand as accepted by insurance). To test fasting in the AM, Disp: 1 Kit, Rfl: 0  •  Lancet Device Misc, MATCH TO CURRENT GLUCOMETER, To test fasting in the AM, Disp: 100 Each, Rfl: 4  •  SitaGLIPtin-MetFORMIN HCl (JANUMET XR)  MG TABLET SR 24 HR, Take 1 Tab by mouth every day., Disp: 30 Tab, Rfl: 4  •  Blood Glucose Monitoring Suppl SUPPLIES Misc, Test Strips (brand as accepted by insurance)Sig: Use AM fasting., Disp: 100 Each, Rfl: 4  ALLERGIES:   Allergies   Allergen Reactions   • Nkda [No Known Drug Allergy]      SURGHX:   Past Surgical History:   Procedure Laterality Date   • HIATAL HERNIA REPAIR  6/16/2011    Performed by RAMSES GALVEZ at SURGERY [facility]   • LAPAROSCOPY  6/16/2011    Performed by RAMSES GALVEZ at [facility]   • LAPAROSCOPY  4/4/2011    Lap band revision:Performed by RAMSES GALVEZ at [facility] ORS   • COLONOSCOPY  2008   • GASTRIC BANDING LAPAROSCOPIC  10/2007   • OTHER SURGICAL PROCEDURE  2003    uterine ablation   • TUBAL COAGULATION LAPAROSCOPIC BILATERAL  1995   • ENDOMETRIAL ABLATION       SOCHX:  reports that she has never smoked. She has never used smokeless tobacco. She reports that she drinks about 1.2 oz of alcohol per week . She reports that she does not use drugs.  FH: family history includes Arthritis in her maternal grandfather; Diabetes in her maternal uncle, mother, paternal aunt, and paternal uncle; Genetic in her father and maternal grandmother; Heart Disease in her maternal grandfather and mother; Hyperlipidemia in her father; Hypertension in her father, mother, and sister; Stroke in her mother.       Objective:     /82   Pulse (!) 108   Temp 36.5 °C (97.7 °F)   Ht 1.676 m (5' 6\")   Wt 88.5 kg (195 lb)   SpO2 95%   BMI 31.47 kg/m²      Physical Exam   Constitutional: She appears well-developed and well-nourished. She is cooperative. She does not " have a sickly appearance. She does not appear ill. No distress.   Cardiovascular: Normal rate, regular rhythm and normal heart sounds.    Pulmonary/Chest: Effort normal and breath sounds normal.   Abdominal: Soft. She exhibits no distension. There is no tenderness. There is no CVA tenderness.   Neurological: She is alert.   Skin: Skin is warm and dry.   Psychiatric: She has a normal mood and affect.               Assessment/Plan:     1. Acute cystitis with hematuria  - sulfamethoxazole-trimethoprim (BACTRIM DS) 800-160 MG tablet; Take 1 Tab by mouth every 12 hours for 3 days.  Dispense: 6 Tab; Refill: 0    2. Symptoms involving urinary system  Positive blood, protein- POCT Urinalysis

## 2018-04-18 NOTE — PATIENT INSTRUCTIONS
Use an oral probiotic daily, such as Culturelle, Align, or yogurt to reduce gastrointestinal symptoms.  You may also use over-the-counter phenazopyridine (AZO) as needed for urinary burning symptom relief.    Urinary Tract Infection, Adult  Introduction  A urinary tract infection (UTI) is an infection of any part of the urinary tract. The urinary tract includes the:  · Kidneys.  · Ureters.  · Bladder.  · Urethra.  These organs make, store, and get rid of pee (urine) in the body.  Follow these instructions at home:  · Take over-the-counter and prescription medicines only as told by your doctor.  · If you were prescribed an antibiotic medicine, take it as told by your doctor. Do not stop taking the antibiotic even if you start to feel better.  · Avoid the following drinks:  ¨ Alcohol.  ¨ Caffeine.  ¨ Tea.  ¨ Carbonated drinks.  · Drink enough fluid to keep your pee clear or pale yellow.  · Keep all follow-up visits as told by your doctor. This is important.  · Make sure to:  ¨ Empty your bladder often and completely. Do not to hold pee for long periods of time.  ¨ Empty your bladder before and after sex.  ¨ Wipe from front to back after a bowel movement if you are female. Use each tissue one time when you wipe.  Contact a doctor if:  · You have back pain.  · You have a fever.  · You feel sick to your stomach (nauseous).  · You throw up (vomit).  · Your symptoms do not get better after 3 days.  · Your symptoms go away and then come back.  Get help right away if:  · You have very bad back pain.  · You have very bad lower belly (abdominal) pain.  · You are throwing up and cannot keep down any medicines or water.  This information is not intended to replace advice given to you by your health care provider. Make sure you discuss any questions you have with your health care provider.  Document Released: 06/05/2009 Document Revised: 05/25/2017 Document Reviewed: 11/07/2016  © 2017 [employer]

## 2018-04-25 DIAGNOSIS — J30.89 OTHER ALLERGIC RHINITIS: ICD-10-CM

## 2018-04-26 RX ORDER — FLUTICASONE PROPIONATE 50 MCG
1 SPRAY, SUSPENSION (ML) NASAL 2 TIMES DAILY
Qty: 3 BOTTLE | Refills: 3 | Status: SHIPPED | OUTPATIENT
Start: 2018-04-26 | End: 2019-12-05

## 2018-05-04 ENCOUNTER — HOSPITAL ENCOUNTER (OUTPATIENT)
Dept: LAB | Facility: MEDICAL CENTER | Age: 53
End: 2018-05-04
Attending: INTERNAL MEDICINE
Payer: COMMERCIAL

## 2018-05-04 DIAGNOSIS — R25.2 CRAMPS OF LOWER EXTREMITY: ICD-10-CM

## 2018-05-04 LAB — CK SERPL-CCNC: 189 U/L (ref 0–154)

## 2018-05-04 PROCEDURE — 36415 COLL VENOUS BLD VENIPUNCTURE: CPT

## 2018-05-04 PROCEDURE — 82550 ASSAY OF CK (CPK): CPT

## 2018-05-07 ENCOUNTER — TELEPHONE (OUTPATIENT)
Dept: MEDICAL GROUP | Facility: PHYSICIAN GROUP | Age: 53
End: 2018-05-07

## 2018-05-07 NOTE — TELEPHONE ENCOUNTER
----- Message from Dolly Newby M.D. sent at 5/4/2018 10:44 PM PDT -----  Your CPK was mildly elevated due to your leg cramps. Have enough hydration with Oral liquids which should resolve it.   Dolly Newby M.D.

## 2018-05-30 ENCOUNTER — TELEPHONE (OUTPATIENT)
Dept: MEDICAL GROUP | Facility: PHYSICIAN GROUP | Age: 53
End: 2018-05-30

## 2018-05-30 NOTE — TELEPHONE ENCOUNTER
Future Appointments       Provider Department Center    5/31/2018 2:20 PM Dolly Newby M.D. [facility]        ESTABLISHED PATIENT PRE-VISIT PLANNING     Note: Patient will not be contacted if there is no indication to call.     1.  Reviewed notes from the last few office visits within the medical group: Yes    2.  If any orders were placed at last visit or intended to be done for this visit (i.e. 6 mos follow-up), do we have Results/Consult Notes?        •  Labs - Labs ordered, completed on 05/04/18 and results are in chart.       •  Imaging - Imaging ordered, completed and results are in chart.       •  Referrals - No referrals were ordered at last office visit.    3. Is this appointment scheduled as a Hospital Follow-Up? No    4.  Immunizations were updated in [place] using WebIZ?: Yes       •  Web Iz Recommendations: TD    5.  Patient is due for the following Health Maintenance Topics:   Health Maintenance Due   Topic Date Due   • RETINAL SCREENING  [birth year]/1984   • MAMMOGRAM  04/27/2018       6.  MDX printed for Provider? NO [facility]     7.  Patient was informed to arrive 15 min prior to their scheduled appointment and bring in their medication bottles.

## 2018-05-31 ENCOUNTER — OFFICE VISIT (OUTPATIENT)
Dept: MEDICAL GROUP | Facility: PHYSICIAN GROUP | Age: 53
End: 2018-05-31
Payer: COMMERCIAL

## 2018-05-31 VITALS
HEIGHT: 66 IN | OXYGEN SATURATION: 96 % | SYSTOLIC BLOOD PRESSURE: 134 MMHG | HEART RATE: 104 BPM | DIASTOLIC BLOOD PRESSURE: 74 MMHG | TEMPERATURE: 99.1 F | BODY MASS INDEX: 31.34 KG/M2 | WEIGHT: 195 LBS

## 2018-05-31 DIAGNOSIS — R00.0 TACHYCARDIA: ICD-10-CM

## 2018-05-31 DIAGNOSIS — E78.5 HYPERLIPIDEMIA ASSOCIATED WITH TYPE 2 DIABETES MELLITUS (HCC): ICD-10-CM

## 2018-05-31 DIAGNOSIS — E11.69 HYPERLIPIDEMIA ASSOCIATED WITH TYPE 2 DIABETES MELLITUS (HCC): ICD-10-CM

## 2018-05-31 DIAGNOSIS — Z00.00 HEALTHCARE MAINTENANCE: ICD-10-CM

## 2018-05-31 DIAGNOSIS — J30.1 SEASONAL ALLERGIC RHINITIS DUE TO POLLEN: ICD-10-CM

## 2018-05-31 DIAGNOSIS — E66.9 CLASS 1 OBESITY WITH BODY MASS INDEX (BMI) OF 31.0 TO 31.9 IN ADULT, UNSPECIFIED OBESITY TYPE, UNSPECIFIED WHETHER SERIOUS COMORBIDITY PRESENT: ICD-10-CM

## 2018-05-31 DIAGNOSIS — Z12.31 ENCOUNTER FOR SCREENING MAMMOGRAM FOR BREAST CANCER: ICD-10-CM

## 2018-05-31 DIAGNOSIS — E11.42 TYPE 2 DIABETES MELLITUS WITH DIABETIC POLYNEUROPATHY, WITHOUT LONG-TERM CURRENT USE OF INSULIN (HCC): ICD-10-CM

## 2018-05-31 DIAGNOSIS — F41.8 DEPRESSION WITH ANXIETY: ICD-10-CM

## 2018-05-31 DIAGNOSIS — N39.46 MIXED STRESS AND URGE URINARY INCONTINENCE: ICD-10-CM

## 2018-05-31 PROCEDURE — 92250 FUNDUS PHOTOGRAPHY W/I&R: CPT | Mod: TC | Performed by: INTERNAL MEDICINE

## 2018-05-31 PROCEDURE — 99214 OFFICE O/P EST MOD 30 MIN: CPT | Performed by: INTERNAL MEDICINE

## 2018-05-31 RX ORDER — EZETIMIBE 10 MG/1
10 TABLET ORAL DAILY
Qty: 90 TAB | Refills: 1 | Status: SHIPPED | OUTPATIENT
Start: 2018-05-31 | End: 2018-12-10 | Stop reason: SDUPTHER

## 2018-05-31 RX ORDER — OXYBUTYNIN CHLORIDE 10 MG/1
10 TABLET, EXTENDED RELEASE ORAL DAILY
Qty: 90 TAB | Refills: 1 | Status: SHIPPED | OUTPATIENT
Start: 2018-05-31 | End: 2019-07-26

## 2018-05-31 NOTE — ASSESSMENT & PLAN NOTE
Due for mammogram . Retinal screening for diabetes which pt thinks was done sometime back which she does not remember where and when it was done , wants to get it done today.

## 2018-05-31 NOTE — ASSESSMENT & PLAN NOTE
This is a chronic health problem that is uncontrolled with current medications and lifestyle measures. Last A1c in February 2018 was 6.5%, currently on medication Janumet 50/500 daily. Patient is due for a retinal exam.

## 2018-05-31 NOTE — PROGRESS NOTES
CC: Urinary incontinence    HISTORY OF PRESENT ILLNESS: Patient is a 52 y.o. female established patient who presents today to discuss on her medical conditions and worsening urinary incontinence.    Health Maintenance: Completed    Healthcare maintenance  Due for mammogram . Retinal screening for diabetes which pt thinks was done sometime back which she does not remember where and when it was done , wants to get it done today.    Mixed stress and urge urinary incontinence  This is a chronic health problem that is uncontrolled with current medications and lifestyle measures. Have tried Kegels exercises. Worsening recently.    Tachycardia  This is a chronic health problem that is uncontrolled with current medications and lifestyle measures. ALways tachycardia , last TSH in 2016 was normal. Denies any symptoms of chest pain, palpitations, syncope.    Type 2 diabetes mellitus with diabetic polyneuropathy, without long-term current use of insulin (CMS-[facility]) ([facility])  This is a chronic health problem that is uncontrolled with current medications and lifestyle measures. Last A1c in February 2018 was 6.5%, currently on medication Janumet 50/500 daily. Patient is due for a retinal exam.      PHQ score 0, BMI > 31 , no tobacco, no fall injuries.    Patient Active Problem List    Diagnosis Date Noted   • Mixed stress and urge urinary incontinence 05/31/2018   • Tachycardia 05/31/2018   • Healthcare maintenance 04/13/2018   • Left hip pain 02/16/2018   • Type 2 diabetes mellitus with diabetic polyneuropathy, without long-term current use of insulin ([facility]) 10/27/2016   • Body mass index 33.0-33.9, adult 10/27/2016   • Obstructive sleep apnea 10/12/2016   • Insomnia 10/12/2016   • Essential hypertension 01/17/2012   • Hyperlipidemia associated with type 2 diabetes mellitus ([facility]) 06/02/2010   • Depression with anxiety 06/17/2009   • Allergic rhinitis 06/17/2009      Allergies:Nkda [no known drug allergy]    Current Outpatient  Prescriptions   Medication Sig Dispense Refill   • ezetimibe (ZETIA) 10 MG Tab Take 1 Tab by mouth every day. 90 Tab 1   • oxybutynin SR (DITROPAN-XL) 10 MG CR tablet Take 1 Tab by mouth every day. 90 Tab 1   • fluticasone (FLONASE) 50 MCG/ACT nasal spray Spray 1 Spray in nose 2 times a day. Each nostril 3 Bottle 3   • valsartan (DIOVAN) 160 MG Tab TAKE ONE TABLET BY MOUTH ONCE DAILY 90 Tab 0   • zolpidem (AMBIEN) 5 MG Tab Take 1 Tab by mouth at bedtime as needed for Sleep for up to 90 days. 90 Tab 0   • SitaGLIPtin-MetFORMIN HCl (JANUMET XR)  MG TABLET SR 24 HR Take 1 Tab by mouth every day. 30 Tab 4   • venlafaxine (EFFEXOR) 75 MG Tab TAKE ONE TABLET BY MOUTH TWICE DAILY WITH MEALS 180 Tab 0   • naproxen (NAPROSYN) 500 MG Tab Take 1 Tab by mouth 2 times a day, with meals. 90 Tab 0   • diazepam (VALIUM) 5 MG Tab Take 1 Tab by mouth every 8 hours as needed for Anxiety. Followup visit for additional renewals 60 Tab 2   • Misc. Devices Kit Glucometer (brand as accepted by insurance). To test fasting in the AM 1 Kit 0   • Lancet Device Misc MATCH TO CURRENT GLUCOMETER, To test fasting in the  Each 4   • Blood Glucose Monitoring Suppl SUPPLIES Misc Test Strips (brand as accepted by insurance)Sig: Use AM fasting. 100 Each 4     No current facility-administered medications for this visit.        Social History   Substance Use Topics   • Smoking status: Never Smoker   • Smokeless tobacco: Never Used      Comment: avoid all tobacco products   • Alcohol use 1.2 oz/week     2 Glasses of wine per week      Comment: two per day     Social History     Social History Narrative   • No narrative on file       Family History   Problem Relation Age of Onset   • Hypertension Mother    • Diabetes Mother    • Stroke Mother    • Heart Disease Mother    • Genetic Father      Parkinsons   • Hypertension Father    • Hyperlipidemia Father    • Hypertension Sister    • Diabetes Maternal Uncle    • Diabetes Paternal Aunt    •  "Diabetes Paternal Uncle    • Genetic Maternal Grandmother      kidney failure   • Arthritis Maternal Grandfather    • Heart Disease Maternal Grandfather    • Stroke     • Sleep Apnea Neg Hx         ROS:     - Constitutional:  Negative for fever, chills, unexpected weight change, and fatigue/generalized weakness.    - HEENT:  Negative for headaches, vision changes, hearing changes, ear pain, ear discharge, rhinorrhea, sinus congestion, sore throat, and neck pain.      - Respiratory: Negative for cough, sputum production, chest congestion, dyspnea, wheezing, and crackles.      - Cardiovascular: Negative for chest pain, palpitations, orthopnea, and bilateral lower extremity edema.     - Gastrointestinal: Negative for heartburn, nausea, vomiting, abdominal pain, hematochezia, melena, diarrhea, constipation, and greasy/foul-smelling stools.     - Genitourinary: Positive for urinary incontinence Negative for dysuria, polyuria, hematuria, pyuria.    - Musculoskeletal: Negative for myalgias, back pain, and joint pain.     - Skin: Negative for rash, itching, cyanotic skin color change.     - Neurological: Negative for dizziness, tingling, tremors, focal sensory deficit, focal weakness and headaches.     - Endo/Heme/Allergies: Does not bruise/bleed easily.     - Psychiatric/Behavioral: Negative for depression, suicidal/homicidal ideation and memory loss.      Last lab work done in February 2018 reviewed and discussed the patient.      Exam:    Blood pressure 134/74, pulse (!) 104, temperature 37.3 °C (99.1 °F), height 1.676 m (5' 6\"), weight 88.5 kg (195 lb), SpO2 96 %. Body mass index is 31.47 kg/m².    General:  Well nourished, well developed female in NAD  Head is grossly normal.  Neck: Supple without JVD or bruit. Thyroid is not enlarged.  Pulmonary: Clear to ausculation and percussion.  Normal effort. No rales, ronchi, or wheezing.  Cardiovascular: Regular rate and rhythm without murmur. Carotid and radial pulses are " intact and equal bilaterally.  Abdominal exam : Soft, no guarding or rigidity. Mild epigastric tenderness on palpation. No Hepatosplenomegaly.  Extremities: no clubbing, cyanosis, or edema.      Please note that this dictation was created using voice recognition software. I have made every reasonable attempt to correct obvious errors, but I expect that there are errors of grammar and possibly content that I did not discover before finalizing the note.    Assessment/Plan:  1. Type 2 diabetes mellitus with diabetic polyneuropathy, without long-term current use of insulin (HCC)  Last A1c in February 2018, 6.5%, will repeat an A1c at this time and also due for retinal eye exam will be done in the office today.  - HEMOGLOBIN A1C; Future  - POCT Retinal Eye Exam    2. Hyperlipidemia associated with type 2 diabetes mellitus (HCC)  Uncontrolled, last lipid panel in February 2018 with elevated LDL more than 132. Patient is supposed to be on atorvastatin 80 mg daily at bedtime which she is not taking any time thinking that she might be having intolerance to it. I'm stopping the Darvocet at this time and starting her on Zetia 10 mg daily at bedtime.  - ezetimibe (ZETIA) 10 MG Tab; Take 1 Tab by mouth every day.  Dispense: 90 Tab; Refill: 1    3. Depression with anxiety  Stable, continue current venlafaxine 75 mg twice a day.    4. Body mass index 33.0-33.9, adult  5. Class 1 obesity with body mass index (BMI) of 31.0 to 31.9 in adult, unspecified obesity type, unspecified whether serious comorbidity present  Pt educated on the increase of morbidity and mortality associated with excess weight including DM, Heart Disease, HTN, stroke, and sleep apnea.  Pt advised weight loss of 5% through reduced calorie, low fat diet and 150 mins of exercise a week  Recommend obesity clinic if patient is unsuccessful with weight loss    5. Seasonal allergic rhinitis due to pollen  Stable, continue current Flonase when necessary    6. Mixed  stress and urge urinary incontinence  Uncontrolled, we'll start her on oxybutynin and given referral to urology for possible need of bladder sling procedure.  - REFERRAL TO UROLOGY  - oxybutynin SR (DITROPAN-XL) 10 MG CR tablet; Take 1 Tab by mouth every day.  Dispense: 90 Tab; Refill: 1    8. Encounter for screening mammogram for breast cancer  - [id number]; Future    9. Tachycardia  Will check for thyroid function tests for possible hyperthyroidism causing her tachycardia.  - TSH WITH REFLEX TO FT4; Future

## 2018-05-31 NOTE — ASSESSMENT & PLAN NOTE
This is a chronic health problem that is uncontrolled with current medications and lifestyle measures. Have tried Kegels exercises. Worsening recently.

## 2018-05-31 NOTE — ASSESSMENT & PLAN NOTE
This is a chronic health problem that is uncontrolled with current medications and lifestyle measures. ALways tachycardia , last TSH in 2016 was normal. Denies any symptoms of chest pain, palpitations, syncope.

## 2018-06-01 ENCOUNTER — HOSPITAL ENCOUNTER (OUTPATIENT)
Dept: LAB | Facility: MEDICAL CENTER | Age: 53
End: 2018-06-01
Attending: INTERNAL MEDICINE
Payer: COMMERCIAL

## 2018-06-01 DIAGNOSIS — R00.0 TACHYCARDIA: ICD-10-CM

## 2018-06-01 DIAGNOSIS — E11.42 TYPE 2 DIABETES MELLITUS WITH DIABETIC POLYNEUROPATHY, WITHOUT LONG-TERM CURRENT USE OF INSULIN (HCC): ICD-10-CM

## 2018-06-01 LAB
EST. AVERAGE GLUCOSE BLD GHB EST-MCNC: 146 MG/DL
HBA1C MFR BLD: 6.7 % (ref 0–5.6)
TSH SERPL DL<=0.005 MIU/L-ACNC: 0.86 UIU/ML (ref 0.38–5.33)

## 2018-06-01 PROCEDURE — 84443 ASSAY THYROID STIM HORMONE: CPT

## 2018-06-01 PROCEDURE — 83036 HEMOGLOBIN GLYCOSYLATED A1C: CPT

## 2018-06-01 PROCEDURE — 36415 COLL VENOUS BLD VENIPUNCTURE: CPT

## 2018-06-04 ENCOUNTER — TELEPHONE (OUTPATIENT)
Dept: MEDICAL GROUP | Facility: PHYSICIAN GROUP | Age: 53
End: 2018-06-04

## 2018-06-04 LAB — RETINAL SCREEN: NEGATIVE

## 2018-06-04 NOTE — TELEPHONE ENCOUNTER
----- Message from Dolly Newby M.D. sent at 6/2/2018 12:12 PM PDT -----  All of your labs were normal.( given your age I am ok with your A1c < 7 )  You may see some that are highlighted, however these have no clinical significance.  Dolly Newby M.D.

## 2018-06-20 DIAGNOSIS — G44.209 TENSION HEADACHE: ICD-10-CM

## 2018-06-20 RX ORDER — NAPROXEN 500 MG/1
500 TABLET ORAL 2 TIMES DAILY WITH MEALS
Qty: 90 TAB | Refills: 0 | Status: SHIPPED | OUTPATIENT
Start: 2018-06-20 | End: 2020-09-10 | Stop reason: SDUPTHER

## 2018-06-20 NOTE — TELEPHONE ENCOUNTER
Was the patient seen in the last year in this department? Yes     Does patient have an active prescription for medications requested? No     Received Request Via: Pharmacy      Pt met protocol?: Yes, last ov 4/13/18

## 2018-07-19 DIAGNOSIS — G44.209 TENSION HEADACHE: ICD-10-CM

## 2018-07-20 RX ORDER — NAPROXEN 500 MG/1
TABLET ORAL
Refills: 0 | OUTPATIENT
Start: 2018-07-20

## 2018-07-20 NOTE — TELEPHONE ENCOUNTER
Was the patient seen in the last year in this department? Yes     Does patient have an active prescription for medications requested? No     Received Request Via: Pharmacy      Pt met protocol?: Yes    LAST OV 05/31/2018

## 2018-07-21 RX ORDER — VENLAFAXINE 75 MG/1
75 TABLET ORAL 2 TIMES DAILY WITH MEALS
Qty: 180 TAB | Refills: 0 | Status: SHIPPED | OUTPATIENT
Start: 2018-07-21 | End: 2018-12-10 | Stop reason: SDUPTHER

## 2018-07-23 ENCOUNTER — TELEPHONE (OUTPATIENT)
Dept: MEDICAL GROUP | Facility: PHYSICIAN GROUP | Age: 53
End: 2018-07-23

## 2018-07-23 DIAGNOSIS — I10 ESSENTIAL HYPERTENSION: ICD-10-CM

## 2018-07-24 RX ORDER — LOSARTAN POTASSIUM 50 MG/1
50 TABLET ORAL DAILY
Qty: 30 TAB | Refills: 5 | Status: SHIPPED | OUTPATIENT
Start: 2018-07-24 | End: 2019-01-20 | Stop reason: SDUPTHER

## 2018-07-24 NOTE — TELEPHONE ENCOUNTER
I have prescribed losartan 50 mg as alternative. Should take once daily just like the valsartan.  Michelle Kearns PA-C covering for Dr. Newby

## 2018-07-30 ENCOUNTER — HOSPITAL ENCOUNTER (OUTPATIENT)
Dept: LAB | Facility: MEDICAL CENTER | Age: 53
End: 2018-07-30
Attending: ANESTHESIOLOGY
Payer: COMMERCIAL

## 2018-07-30 LAB
ALBUMIN SERPL BCP-MCNC: 4.7 G/DL (ref 3.2–4.9)
ALBUMIN/GLOB SERPL: 1.8 G/DL
ALP SERPL-CCNC: 48 U/L (ref 30–99)
ALT SERPL-CCNC: 15 U/L (ref 2–50)
AMORPH CRY #/AREA URNS HPF: PRESENT /HPF
ANION GAP SERPL CALC-SCNC: 9 MMOL/L (ref 0–11.9)
APPEARANCE UR: ABNORMAL
AST SERPL-CCNC: 20 U/L (ref 12–45)
BACTERIA #/AREA URNS HPF: ABNORMAL /HPF
BASOPHILS # BLD AUTO: 0.4 % (ref 0–1.8)
BASOPHILS # BLD: 0.03 K/UL (ref 0–0.12)
BILIRUB SERPL-MCNC: 0.8 MG/DL (ref 0.1–1.5)
BILIRUB UR QL STRIP.AUTO: NEGATIVE
BUN SERPL-MCNC: 17 MG/DL (ref 8–22)
CALCIUM SERPL-MCNC: 9.6 MG/DL (ref 8.5–10.5)
CHLORIDE SERPL-SCNC: 106 MMOL/L (ref 96–112)
CO2 SERPL-SCNC: 25 MMOL/L (ref 20–33)
COLOR UR: YELLOW
CREAT SERPL-MCNC: 0.82 MG/DL (ref 0.5–1.4)
EOSINOPHIL # BLD AUTO: 0.15 K/UL (ref 0–0.51)
EOSINOPHIL NFR BLD: 2.2 % (ref 0–6.9)
EPI CELLS #/AREA URNS HPF: ABNORMAL /HPF
ERYTHROCYTE [DISTWIDTH] IN BLOOD BY AUTOMATED COUNT: 41.5 FL (ref 35.9–50)
GLOBULIN SER CALC-MCNC: 2.6 G/DL (ref 1.9–3.5)
GLUCOSE SERPL-MCNC: 129 MG/DL (ref 65–99)
GLUCOSE UR STRIP.AUTO-MCNC: NEGATIVE MG/DL
HCT VFR BLD AUTO: 45.9 % (ref 37–47)
HGB BLD-MCNC: 14.7 G/DL (ref 12–16)
IMM GRANULOCYTES # BLD AUTO: 0.02 K/UL (ref 0–0.11)
IMM GRANULOCYTES NFR BLD AUTO: 0.3 % (ref 0–0.9)
KETONES UR STRIP.AUTO-MCNC: NEGATIVE MG/DL
LEUKOCYTE ESTERASE UR QL STRIP.AUTO: ABNORMAL
LYMPHOCYTES # BLD AUTO: 1.86 K/UL (ref 1–4.8)
LYMPHOCYTES NFR BLD: 27.8 % (ref 22–41)
MCH RBC QN AUTO: 28.9 PG (ref 27–33)
MCHC RBC AUTO-ENTMCNC: 32 G/DL (ref 33.6–35)
MCV RBC AUTO: 90.2 FL (ref 81.4–97.8)
MICRO URNS: ABNORMAL
MONOCYTES # BLD AUTO: 0.54 K/UL (ref 0–0.85)
MONOCYTES NFR BLD AUTO: 8.1 % (ref 0–13.4)
MUCOUS THREADS #/AREA URNS HPF: ABNORMAL /HPF
NEUTROPHILS # BLD AUTO: 4.1 K/UL (ref 2–7.15)
NEUTROPHILS NFR BLD: 61.2 % (ref 44–72)
NITRITE UR QL STRIP.AUTO: NEGATIVE
NRBC # BLD AUTO: 0 K/UL
NRBC BLD-RTO: 0 /100 WBC
PH UR STRIP.AUTO: 6 [PH]
PLATELET # BLD AUTO: 369 K/UL (ref 164–446)
PMV BLD AUTO: 11 FL (ref 9–12.9)
POTASSIUM SERPL-SCNC: 4.4 MMOL/L (ref 3.6–5.5)
PROT SERPL-MCNC: 7.3 G/DL (ref 6–8.2)
PROT UR QL STRIP: NEGATIVE MG/DL
RBC # BLD AUTO: 5.09 M/UL (ref 4.2–5.4)
RBC # URNS HPF: ABNORMAL /HPF
RBC UR QL AUTO: ABNORMAL
SODIUM SERPL-SCNC: 140 MMOL/L (ref 135–145)
SP GR UR STRIP.AUTO: >=1.03
UROBILINOGEN UR STRIP.AUTO-MCNC: 0.2 MG/DL
WBC # BLD AUTO: 6.7 K/UL (ref 4.8–10.8)
WBC #/AREA URNS HPF: ABNORMAL /HPF

## 2018-07-30 PROCEDURE — 80053 COMPREHEN METABOLIC PANEL: CPT

## 2018-07-30 PROCEDURE — 81001 URINALYSIS AUTO W/SCOPE: CPT

## 2018-07-30 PROCEDURE — 36415 COLL VENOUS BLD VENIPUNCTURE: CPT

## 2018-07-30 PROCEDURE — 85025 COMPLETE CBC W/AUTO DIFF WBC: CPT

## 2018-07-30 PROCEDURE — 87086 URINE CULTURE/COLONY COUNT: CPT

## 2018-08-01 LAB
BACTERIA UR CULT: NORMAL
SIGNIFICANT IND 70042: NORMAL
SITE SITE: NORMAL
SOURCE SOURCE: NORMAL

## 2018-08-06 ENCOUNTER — PATIENT OUTREACH (OUTPATIENT)
Dept: HEALTH INFORMATION MANAGEMENT | Facility: OTHER | Age: 53
End: 2018-08-06

## 2018-08-06 NOTE — PROGRESS NOTES
Outcome:scheduled pt for mammogram and addressed zoster immunization pt stated will schedule when she is ready. Did not go over chart to update because pt stated she wad going to lunch.     Please transfer to Patient Outreach Team at [phone] when patient returns call.    WebIZ Checked & Epic Updated:  yes  Td (adult), adsorbed   Influenza w/preserv.   Zoster Recombinant     [facility] Verified: no    Attempt # 1

## 2018-09-07 ENCOUNTER — HOSPITAL ENCOUNTER (OUTPATIENT)
Dept: RADIOLOGY | Facility: MEDICAL CENTER | Age: 53
End: 2018-09-07
Attending: INTERNAL MEDICINE
Payer: COMMERCIAL

## 2018-09-07 DIAGNOSIS — Z12.31 ENCOUNTER FOR SCREENING MAMMOGRAM FOR BREAST CANCER: ICD-10-CM

## 2018-09-07 PROCEDURE — 77067 SCR MAMMO BI INCL CAD: CPT

## 2018-09-09 DIAGNOSIS — E11.42 TYPE 2 DIABETES MELLITUS WITH DIABETIC POLYNEUROPATHY, WITHOUT LONG-TERM CURRENT USE OF INSULIN (HCC): ICD-10-CM

## 2018-09-10 ENCOUNTER — TELEPHONE (OUTPATIENT)
Dept: MEDICAL GROUP | Facility: PHYSICIAN GROUP | Age: 53
End: 2018-09-10

## 2018-09-10 NOTE — TELEPHONE ENCOUNTER
----- Message from Dolly Newby M.D. sent at 9/7/2018 10:38 PM PDT -----  I reviewed the results of your mammogram. Radiologist reports normal findings. I recommend recheck in one year.Thanks.  Dolly Newby M.D.

## 2018-09-11 RX ORDER — SITAGLIPTIN AND METFORMIN HYDROCHLORIDE 50; 500 MG/1; MG/1
TABLET, FILM COATED, EXTENDED RELEASE ORAL
Qty: 90 TAB | Refills: 0 | Status: SHIPPED | OUTPATIENT
Start: 2018-09-11 | End: 2018-12-10 | Stop reason: SDUPTHER

## 2018-10-23 ENCOUNTER — TELEPHONE (OUTPATIENT)
Dept: MEDICAL GROUP | Facility: PHYSICIAN GROUP | Age: 53
End: 2018-10-23

## 2018-10-23 NOTE — TELEPHONE ENCOUNTER
Future Appointments       Provider Department Center    10/24/2018 2:40 PM Dolly Newby M.D. [facility]        ESTABLISHED PATIENT PRE-VISIT PLANNING     Note: Patient will not be contacted if there is no indication to call.     1.  Reviewed notes from the last few office visits within the medical group: Yes    2.  If any orders were placed at last visit or intended to be done for this visit (i.e. 6 mos follow-up), do we have Results/Consult Notes?        •  Labs - Labs ordered, completed on 07/30/18 and results are in chart.       •  Imaging - Imaging ordered, completed and results are in chart.       •  Referrals - Referral ordered, patient has NOT been seen.    3. Is this appointment scheduled as a Hospital Follow-Up? No    4.  Immunizations were updated in [facility] using WebIZ?: Yes       •  Web Iz Recommendations: FLU, TD and ZOSTAVAX (Shingles)    5.  Patient is due for the following Health Maintenance Topics:   Health Maintenance Due   Topic Date Due   • IMM ZOSTER VACCINES (1 of 2) Not available in clinic    • IMM INFLUENZA (1) 09/01/2018       6.  MDX printed for Provider? NO    7.  Patient was informed to arrive 15 min prior to their scheduled appointment and bring in their medication bottles. Confirmed through automated call

## 2018-10-24 ENCOUNTER — OFFICE VISIT (OUTPATIENT)
Dept: MEDICAL GROUP | Facility: PHYSICIAN GROUP | Age: 53
End: 2018-10-24
Payer: COMMERCIAL

## 2018-10-24 VITALS
OXYGEN SATURATION: 96 % | BODY MASS INDEX: 31.34 KG/M2 | HEIGHT: 66 IN | TEMPERATURE: 98.7 F | DIASTOLIC BLOOD PRESSURE: 78 MMHG | WEIGHT: 195 LBS | SYSTOLIC BLOOD PRESSURE: 122 MMHG | HEART RATE: 102 BPM

## 2018-10-24 DIAGNOSIS — G47.33 OBSTRUCTIVE SLEEP APNEA: ICD-10-CM

## 2018-10-24 DIAGNOSIS — J30.1 SEASONAL ALLERGIC RHINITIS DUE TO POLLEN: ICD-10-CM

## 2018-10-24 DIAGNOSIS — N39.46 MIXED STRESS AND URGE URINARY INCONTINENCE: ICD-10-CM

## 2018-10-24 DIAGNOSIS — F41.8 DEPRESSION WITH ANXIETY: ICD-10-CM

## 2018-10-24 DIAGNOSIS — Z23 NEED FOR VACCINATION: ICD-10-CM

## 2018-10-24 DIAGNOSIS — M25.552 LEFT HIP PAIN: ICD-10-CM

## 2018-10-24 DIAGNOSIS — F51.01 PRIMARY INSOMNIA: ICD-10-CM

## 2018-10-24 DIAGNOSIS — I10 ESSENTIAL HYPERTENSION: ICD-10-CM

## 2018-10-24 DIAGNOSIS — E11.69 HYPERLIPIDEMIA ASSOCIATED WITH TYPE 2 DIABETES MELLITUS (HCC): ICD-10-CM

## 2018-10-24 DIAGNOSIS — R00.0 TACHYCARDIA: ICD-10-CM

## 2018-10-24 DIAGNOSIS — E11.42 TYPE 2 DIABETES MELLITUS WITH DIABETIC POLYNEUROPATHY, WITHOUT LONG-TERM CURRENT USE OF INSULIN (HCC): ICD-10-CM

## 2018-10-24 DIAGNOSIS — E78.5 HYPERLIPIDEMIA ASSOCIATED WITH TYPE 2 DIABETES MELLITUS (HCC): ICD-10-CM

## 2018-10-24 DIAGNOSIS — E66.9 CLASS 1 OBESITY WITH BODY MASS INDEX (BMI) OF 31.0 TO 31.9 IN ADULT, UNSPECIFIED OBESITY TYPE, UNSPECIFIED WHETHER SERIOUS COMORBIDITY PRESENT: ICD-10-CM

## 2018-10-24 PROCEDURE — 99396 PREV VISIT EST AGE 40-64: CPT | Mod: 25 | Performed by: INTERNAL MEDICINE

## 2018-10-24 PROCEDURE — 90686 IIV4 VACC NO PRSV 0.5 ML IM: CPT | Performed by: INTERNAL MEDICINE

## 2018-10-24 PROCEDURE — 90471 IMMUNIZATION ADMIN: CPT | Performed by: INTERNAL MEDICINE

## 2018-10-24 RX ORDER — PROPRANOLOL HYDROCHLORIDE 10 MG/1
10 TABLET ORAL 3 TIMES DAILY
Qty: 90 TAB | Refills: 1 | Status: SHIPPED | OUTPATIENT
Start: 2018-10-24 | End: 2018-12-10 | Stop reason: SDUPTHER

## 2018-10-24 NOTE — PROGRESS NOTES
CC: Annual preventative exam visit.    HISTORY OF THE PRESENT ILLNESS: Patient is a 53 y.o. female. This pleasant patient is here today for annual preventative exam and flu vaccine.    Health Maintenance: Completed    PHQ score 0, BMI > 31 , no tobacco, no fall injuries    Allergies: Nkda [no known drug allergy]    Current Outpatient Prescriptions Ordered in [facility]   Medication Sig Dispense Refill   • propranolol (INDERAL) 10 MG Tab Take 1 Tab by mouth 3 times a day. 90 Tab 1   • JANUMET XR  MG TABLET SR 24 HR TAKE 1 TABLET BY MOUTH ONCE DAILY 90 Tab 0   • losartan (COZAAR) 50 MG Tab Take 1 Tab by mouth every day. 30 Tab 5   • venlafaxine (EFFEXOR) 75 MG Tab Take 1 Tab by mouth 2 times a day, with meals. 180 Tab 0   • naproxen (NAPROSYN) 500 MG Tab Take 1 Tab by mouth 2 times a day, with meals. 90 Tab 0   • ezetimibe (ZETIA) 10 MG Tab Take 1 Tab by mouth every day. 90 Tab 1   • oxybutynin SR (DITROPAN-XL) 10 MG CR tablet Take 1 Tab by mouth every day. 90 Tab 1   • fluticasone (FLONASE) 50 MCG/ACT nasal spray Spray 1 Spray in nose 2 times a day. Each nostril 3 Bottle 3   • zolpidem (AMBIEN) 5 MG Tab Take 1 Tab by mouth at bedtime as needed for Sleep for up to 90 days. 90 Tab 0   • diazepam (VALIUM) 5 MG Tab Take 1 Tab by mouth every 8 hours as needed for Anxiety. Followup visit for additional renewals 60 Tab 2   • Misc. Devices Kit Glucometer (brand as accepted by insurance). To test fasting in the AM 1 Kit 0   • Lancet Device Misc MATCH TO CURRENT GLUCOMETER, To test fasting in the  Each 4   • Blood Glucose Monitoring Suppl SUPPLIES Misc Test Strips (brand as accepted by insurance)Sig: Use AM fasting. 100 Each 4     No current Epic-ordered facility-administered medications on file.        Past Medical History:   Diagnosis Date   • Anxiety    • Chickenpox    • Nepalese measles    • Heart burn    • Hiatus hernia syndrome    • Hyperlipidemia    • Hypertension    • Influenza    • Mumps    • Pain     stomach   •  Psychiatric problem     depression   • Sleep apnea    • Tonsillitis        Past Surgical History:   Procedure Laterality Date   • HIATAL HERNIA REPAIR  6/16/2011    Performed by RAMSES GALVEZ at SURGERY [facility]   • LAPAROSCOPY  6/16/2011    Performed by RAMSES GALVEZ at SURGERY [facility]   • LAPAROSCOPY  4/4/2011    Lap band revision:Performed by RAMSES GALVEZ at SURGERY [facility]   • COLONOSCOPY  2008   • GASTRIC BANDING LAPAROSCOPIC  10/2007   • OTHER SURGICAL PROCEDURE  2003    uterine ablation   • TUBAL COAGULATION LAPAROSCOPIC BILATERAL  1995   • ENDOMETRIAL ABLATION         Social History   Substance Use Topics   • Smoking status: Never Smoker   • Smokeless tobacco: Never Used      Comment: avoid all tobacco products   • Alcohol use 1.2 oz/week     2 Glasses of wine per week      Comment: two per day       Social History     Social History Narrative   • No narrative on file       Family History   Problem Relation Age of Onset   • Hypertension Mother    • Diabetes Mother    • Stroke Mother    • Heart Disease Mother    • Genetic Father         Parkinsons   • Hypertension Father    • Hyperlipidemia Father    • Hypertension Sister    • Diabetes Maternal Uncle    • Diabetes Paternal Aunt    • Diabetes Paternal Uncle    • Genetic Maternal Grandmother         kidney failure   • Arthritis Maternal Grandfather    • Heart Disease Maternal Grandfather    • Stroke Unknown    • Sleep Apnea Neg Hx        ROS:     - Constitutional: Negative for fever, chills, unexpected weight change, and fatigue/generalized weakness.     - HEENT: Negative for headaches, vision changes, hearing changes, ear pain, ear discharge, rhinorrhea, sinus congestion, sore throat, and neck pain.      - Respiratory: Negative for cough, sputum production, chest congestion, dyspnea, wheezing, and crackles.      - Cardiovascular: Negative for chest pain, palpitations, orthopnea, and bilateral lower extremity edema.     -  "Gastrointestinal: Negative for heartburn, nausea, vomiting, abdominal pain, hematochezia, melena, diarrhea, constipation, and greasy/foul-smelling stools.     - Genitourinary: Negative for dysuria, polyuria, hematuria, pyuria, urinary urgency, and urinary incontinence.     - Musculoskeletal: Negative for myalgias, back pain, and joint pain.     - Skin: Negative for rash, itching, cyanotic skin color change.     - Neurological: Negative for dizziness, tingling, tremors, focal sensory deficit, focal weakness and headaches.     - Endo/Heme/Allergies: Does not bruise/bleed easily.     - Psychiatric/Behavioral: Negative for depression, suicidal/homicidal ideation and memory loss.       Last lab work in July 2018 reviewed and discussed with the patient.    Exam: Blood pressure 122/78, pulse (!) 102, temperature 37.1 °C (98.7 °F), temperature source Temporal, height 1.676 m (5' 6\"), weight 88.5 kg (195 lb), SpO2 96 %, not currently breastfeeding. Body mass index is 31.47 kg/m².    General: Normal appearing. No distress.  HEENT: Normocephalic. Eyes conjunctiva clear lids without ptosis, pupils equal and reactive to light accommodation, ears normal shape and contour, canals are clear bilaterally, tympanic membranes are benign, nasal mucosa benign, oropharynx is without erythema, edema or exudates.   Neck: Supple without JVD or bruit. Thyroid is not enlarged.  Pulmonary: Clear to ausculation.  Normal effort. No rales, ronchi, or wheezing.  Cardiovascular: Regular rate and rhythm without murmur. Carotid and radial pulses are intact and equal bilaterally.  Abdomen: Soft, nontender, nondistended. Normal bowel sounds. Liver and spleen are not palpable  Neurologic: Grossly nonfocal  Lymph: No cervical, supraclavicular or axillary lymph nodes are palpable  Skin: Warm and dry.  No obvious lesions.  Musculoskeletal: Normal gait. No extremity cyanosis, clubbing, or edema.  Psych: Normal mood and affect. Alert and oriented x3. Judgment " and insight is normal.    Please note that this dictation was created using voice recognition software. I have made every reasonable attempt to correct obvious errors, but I expect that there are errors of grammar and possibly content that I did not discover before finalizing the note.      Assessment/Plan  1. Type 2 diabetes mellitus with diabetic polyneuropathy, without long-term current use of insulin (HCC)  Borderline high, last HbA1c in June 2018 was 6.7%, continue current Janumet 50/500 mg daily.  Will recheck the HbA1c at this time.  - HEMOGLOBIN A1C; Future    2. Depression with anxiety  3. Tachycardia  Stable on depression, continue current venlafaxine 75 mg twice daily.  But [facility] uncontrolled [facility] was normal as per the patient.  Will need to get the records.  Explained to the patient that she is currently on Valium for her anxiety spells, given her tachycardia I would treat her anxiety with propranolol which will control her anxiety and also tachycardia.  Last TSH levels done this year was within normal limits.  Will start on propranolol 10 mg 3 times daily and cut down on her Valium which was explained to the patient which she understood and agreed.  - propranolol (INDERAL) 10 MG Tab; Take 1 Tab by mouth 3 times a day.  Dispense: 90 Tab; Refill: 1    4. Obstructive sleep apnea  Stable, well controlled on current sleep appliancesshe is using.    5. Mixed stress and urge urinary incontinence  Well-controlled, continue current oxybutynin 10 mg daily.    6. Primary insomnia  Stable, continue current Ambien 5 mg nightly as needed.    7. Left hip pain  Stable, continue current naproxen 500 mg twice daily as needed.    8. Hyperlipidemia associated with type 2 diabetes mellitus (HCC)  Last lipid panel in February 2018 was abnormal, patient was started on Zetia.  Will recheck a lipid panel at this time to see if this is working  well.  Continue Zetia 10 mg daily.  - LIPID PROFILE; Future    9. Essential hypertension  Stable, continue current losartan 50 mg daily.    10. BMI 31.0-31.9,adult  11. Class 1 obesity with body mass index (BMI) of 31.0 to 31.9 in adult, unspecified obesity type, unspecified whether serious comorbidity present  Pt educated on the increase of morbidity and mortality associated with excess weight including DM, Heart Disease, HTN, stroke, and sleep apnea.  Pt advised weight loss of 5% through reduced calorie, low fat diet and 150 mins of exercise a week  Recommend obesity clinic if patient is unsuccessful with weight loss    12. Seasonal allergic rhinitis due to pollen  Well-controlled, continue current Flonase nasal spray.    13. Need for vaccination  - Influenza Vaccine Quad Injection >3Y (PF)

## 2018-12-10 DIAGNOSIS — E78.5 HYPERLIPIDEMIA ASSOCIATED WITH TYPE 2 DIABETES MELLITUS (HCC): ICD-10-CM

## 2018-12-10 DIAGNOSIS — F41.8 DEPRESSION WITH ANXIETY: ICD-10-CM

## 2018-12-10 DIAGNOSIS — E11.69 HYPERLIPIDEMIA ASSOCIATED WITH TYPE 2 DIABETES MELLITUS (HCC): ICD-10-CM

## 2018-12-10 DIAGNOSIS — R00.0 TACHYCARDIA: ICD-10-CM

## 2018-12-10 DIAGNOSIS — E11.42 TYPE 2 DIABETES MELLITUS WITH DIABETIC POLYNEUROPATHY, WITHOUT LONG-TERM CURRENT USE OF INSULIN (HCC): ICD-10-CM

## 2018-12-12 RX ORDER — PROPRANOLOL HYDROCHLORIDE 10 MG/1
TABLET ORAL
Qty: 270 TAB | Refills: 1 | Status: SHIPPED | OUTPATIENT
Start: 2018-12-12 | End: 2019-01-03 | Stop reason: SDUPTHER

## 2018-12-12 RX ORDER — SITAGLIPTIN AND METFORMIN HYDROCHLORIDE 50; 500 MG/1; MG/1
TABLET, FILM COATED, EXTENDED RELEASE ORAL
Qty: 90 TAB | Refills: 1 | Status: SHIPPED | OUTPATIENT
Start: 2018-12-12 | End: 2019-02-08

## 2018-12-12 RX ORDER — VENLAFAXINE 75 MG/1
TABLET ORAL
Qty: 180 TAB | Refills: 1 | Status: SHIPPED | OUTPATIENT
Start: 2018-12-12 | End: 2019-02-08

## 2018-12-12 RX ORDER — EZETIMIBE 10 MG/1
TABLET ORAL
Qty: 90 TAB | Refills: 1 | Status: SHIPPED | OUTPATIENT
Start: 2018-12-12 | End: 2019-08-11 | Stop reason: SDUPTHER

## 2018-12-12 NOTE — TELEPHONE ENCOUNTER
Patient has recently been seen by PCP within the last 6 months per protocol (10/18). Will refill medications for 6 months.  Lab Results   Component Value Date/Time    HBA1C 6.7 (H) 06/01/2018 12:16 PM      Lab Results   Component Value Date/Time    MALBCRT 7 02/16/2018 10:47 AM    MICROALBUR 0.7 02/16/2018 10:47 AM      Lab Results   Component Value Date/Time    ALKPHOSPHAT 48 07/30/2018 07:05 AM    ASTSGOT 20 07/30/2018 07:05 AM    ALTSGPT 15 07/30/2018 07:05 AM    TBILIRUBIN 0.8 07/30/2018 07:05 AM      Lab Results   Component Value Date/Time    CHOLSTRLTOT 231 (H) 02/16/2018 10:47 AM     (H) 02/16/2018 10:47 AM    HDL 42 02/16/2018 10:47 AM    TRIGLYCERIDE 286 (H) 02/16/2018 10:47 AM       Lab Results   Component Value Date/Time    SODIUM 140 07/30/2018 07:05 AM    POTASSIUM 4.4 07/30/2018 07:05 AM    CHLORIDE 106 07/30/2018 07:05 AM    CO2 25 07/30/2018 07:05 AM    GLUCOSE 129 (H) 07/30/2018 07:05 AM    BUN 17 07/30/2018 07:05 AM    CREATININE 0.82 07/30/2018 07:05 AM

## 2018-12-12 NOTE — TELEPHONE ENCOUNTER
Was the patient seen in the last year in this department? Yes    Does patient have an active prescription for medications requested? No     Received Request Via: Pharmacy      Pt met protocol?: Yes    LAST OV 10/24/2018    BP Readings from Last 1 Encounters:   10/24/18 122/78       Lab Results  Component Value Date/Time   HBA1C 6.7 (H) 06/01/2018 1216       Lab Results  Component Value Date/Time   AVGLUC 146 06/01/2018 1216       Lab Results  Component Value Date/Time   CHOLSTRLTOT 231 (H) 02/16/2018 1047       Lab Results  Component Value Date/Time   TRIGLYCERIDE 286 (H) 02/16/2018 1047       Lab Results  Component Value Date/Time   HDL 42 02/16/2018 1047       Lab Results  Component Value Date/Time    (H) 02/16/2018 1047

## 2018-12-13 ENCOUNTER — TELEPHONE (OUTPATIENT)
Dept: MEDICAL GROUP | Facility: PHYSICIAN GROUP | Age: 53
End: 2018-12-13

## 2018-12-13 DIAGNOSIS — G47.00 INSOMNIA, UNSPECIFIED TYPE: ICD-10-CM

## 2018-12-13 RX ORDER — ZOLPIDEM TARTRATE 5 MG/1
TABLET ORAL
Qty: 30 TAB | Refills: 2 | Status: SHIPPED | OUTPATIENT
Start: 2018-12-13 | End: 2019-01-14

## 2018-12-13 RX ORDER — ZOLPIDEM TARTRATE 5 MG/1
TABLET ORAL
Refills: 0 | OUTPATIENT
Start: 2018-12-13

## 2018-12-14 NOTE — TELEPHONE ENCOUNTER
Please let the patient know regarding the same so that I will have to give only 20 tabs instead of 30 tabs he is requesting. Will resend as per the patients response.  Dolly Newby M.D.

## 2018-12-14 NOTE — TELEPHONE ENCOUNTER
Call from [facility] stating that [facility] policy is to no longer fill ambien prescriptions at 1 tablet/day. They will fill a max of 20 tablets/30 days now. Please advise.

## 2019-01-03 ENCOUNTER — OFFICE VISIT (OUTPATIENT)
Dept: MEDICAL GROUP | Facility: PHYSICIAN GROUP | Age: 54
End: 2019-01-03
Payer: COMMERCIAL

## 2019-01-03 VITALS
BODY MASS INDEX: 32.59 KG/M2 | TEMPERATURE: 99.1 F | HEIGHT: 66 IN | WEIGHT: 202.8 LBS | SYSTOLIC BLOOD PRESSURE: 122 MMHG | OXYGEN SATURATION: 91 % | HEART RATE: 103 BPM | DIASTOLIC BLOOD PRESSURE: 74 MMHG

## 2019-01-03 DIAGNOSIS — J06.9 VIRAL URI WITH COUGH: ICD-10-CM

## 2019-01-03 DIAGNOSIS — J45.990 EXERCISE-INDUCED ASTHMA WITH ACUTE EXACERBATION: ICD-10-CM

## 2019-01-03 DIAGNOSIS — E66.9 CLASS 1 OBESITY WITH BODY MASS INDEX (BMI) OF 32.0 TO 32.9 IN ADULT, UNSPECIFIED OBESITY TYPE, UNSPECIFIED WHETHER SERIOUS COMORBIDITY PRESENT: ICD-10-CM

## 2019-01-03 DIAGNOSIS — R00.0 TACHYCARDIA: ICD-10-CM

## 2019-01-03 PROCEDURE — 99214 OFFICE O/P EST MOD 30 MIN: CPT | Performed by: INTERNAL MEDICINE

## 2019-01-03 RX ORDER — PREDNISONE 50 MG/1
50 TABLET ORAL DAILY
Qty: 7 TAB | Refills: 0 | Status: SHIPPED | OUTPATIENT
Start: 2019-01-03 | End: 2019-02-08

## 2019-01-03 RX ORDER — AMOXICILLIN AND CLAVULANATE POTASSIUM 875; 125 MG/1; MG/1
1 TABLET, FILM COATED ORAL 2 TIMES DAILY
Qty: 14 TAB | Refills: 0 | Status: SHIPPED | OUTPATIENT
Start: 2019-01-03 | End: 2019-02-08

## 2019-01-03 RX ORDER — PROPRANOLOL HYDROCHLORIDE 10 MG/1
20 TABLET ORAL 3 TIMES DAILY
Qty: 90 TAB | Refills: 1 | Status: SHIPPED | OUTPATIENT
Start: 2019-01-03 | End: 2019-02-08 | Stop reason: SDUPTHER

## 2019-01-03 RX ORDER — ALBUTEROL SULFATE 90 UG/1
2 AEROSOL, METERED RESPIRATORY (INHALATION) EVERY 6 HOURS PRN
Qty: 8.5 G | Refills: 4 | Status: SHIPPED | OUTPATIENT
Start: 2019-01-03 | End: 2020-09-29 | Stop reason: SDUPTHER

## 2019-01-03 ASSESSMENT — PATIENT HEALTH QUESTIONNAIRE - PHQ9: CLINICAL INTERPRETATION OF PHQ2 SCORE: 0

## 2019-01-03 NOTE — ASSESSMENT & PLAN NOTE
This is a new problem started 2 days back which is productive cough. Denies any Sore throat. Has Sx of Ear congestion and Rhinitis. Denies any fever or chills. Her mother was having similar Sx before she left [place] and got this problem.Has pressure on the chest and aggravating while coughing, associated wheezing and dyspnea.    Risk factors of Exercise induced asthma with inhaler in the past , Uncontrolled diabetes , previous Hx of hospitalization for PNA in 2016.

## 2019-01-03 NOTE — PROGRESS NOTES
CC: Acute visit, URI.    HISTORY OF PRESENT ILLNESS: Patient is a 53 y.o. female established patient who presents today to discuss her medical conditions as mentioned in HPI below.    Health Maintenance: Not done in this visit.    Viral URI with cough  This is a new problem started 2 days back which is productive cough. Denies any Sore throat. Has Sx of Ear congestion and Rhinitis. Denies any fever or chills. Her mother was having similar Sx before she left [place] and got this problem.Has pressure on the chest and aggravating while coughing, associated wheezing and dyspnea.    Risk factors of Exercise induced asthma with inhaler in the past , Uncontrolled diabetes , previous Hx of hospitalization for PNA in 2016.      PHQ score 0, BMI within normal limits, no tobacco, no fall injuries.    Patient Active Problem List    Diagnosis Date Noted   • Viral URI with cough 01/03/2019   • Exercise-induced asthma with acute exacerbation 01/03/2019   • Mixed stress and urge urinary incontinence 05/31/2018   • Tachycardia 05/31/2018   • Healthcare maintenance 04/13/2018   • Left hip pain 02/16/2018   • Type 2 diabetes mellitus with diabetic polyneuropathy, without long-term current use of insulin ([facility]) 10/27/2016   • BMI 31.0-31.9,adult 10/27/2016   • Obstructive sleep apnea 10/12/2016   • Insomnia 10/12/2016   • Essential hypertension 01/17/2012   • Hyperlipidemia associated with type 2 diabetes mellitus (HCC) 06/02/2010   • Depression with anxiety 06/17/2009   • Allergic rhinitis 06/17/2009      Allergies:Nkda [no known drug allergy]    Current Outpatient Prescriptions   Medication Sig Dispense Refill   • propranolol (INDERAL) 10 MG Tab Take 2 Tabs by mouth 3 times a day. 90 Tab 1   • predniSONE (DELTASONE) 50 MG Tab Take 1 Tab by mouth every day. 7 Tab 0   • amoxicillin-clavulanate (AUGMENTIN) 875-125 MG Tab Take 1 Tab by mouth 2 times a day. 14 Tab 0   • albuterol 108 (90 Base) MCG/ACT Aero Soln inhalation aerosol Inhale 2  Puffs by mouth every 6 hours as needed for Shortness of Breath. 8.5 g 4   • zolpidem (AMBIEN) 5 MG Tab Take one tab QHS as needed for sleep. 30 Tab 2   • JANUMET XR  MG TABLET SR 24 HR TAKE 1 TABLET BY MOUTH ONCE DAILY 90 Tab 1   • venlafaxine (EFFEXOR) 75 MG Tab TAKE 1 TABLET BY MOUTH TWICE DAILY WITH FOOD 180 Tab 1   • ezetimibe (ZETIA) 10 MG Tab TAKE 1 TABLET BY MOUTH ONCE DAILY 90 Tab 1   • losartan (COZAAR) 50 MG Tab Take 1 Tab by mouth every day. 30 Tab 5   • naproxen (NAPROSYN) 500 MG Tab Take 1 Tab by mouth 2 times a day, with meals. 90 Tab 0   • diazepam (VALIUM) 5 MG Tab Take 1 Tab by mouth every 8 hours as needed for Anxiety. Followup visit for additional renewals 60 Tab 2   • oxybutynin SR (DITROPAN-XL) 10 MG CR tablet Take 1 Tab by mouth every day. (Patient not taking: Reported on 1/3/2019) 90 Tab 1   • fluticasone (FLONASE) 50 MCG/ACT nasal spray Spray 1 Spray in nose 2 times a day. Each nostril (Patient not taking: Reported on 1/3/2019) 3 Bottle 3   • Misc. Devices Kit Glucometer (brand as accepted by insurance). To test fasting in the AM (Patient not taking: Reported on 1/3/2019) 1 Kit 0   • Lancet Device Misc MATCH TO CURRENT GLUCOMETER, To test fasting in the AM (Patient not taking: Reported on 1/3/2019) 100 Each 4   • Blood Glucose Monitoring Suppl SUPPLIES Misc Test Strips (brand as accepted by insurance)Sig: Use AM fasting. (Patient not taking: Reported on 1/3/2019) 100 Each 4     No current facility-administered medications for this visit.        Social History   Substance Use Topics   • Smoking status: Never Smoker   • Smokeless tobacco: Never Used      Comment: avoid all tobacco products   • Alcohol use 1.2 oz/week     2 Glasses of wine per week      Comment: two per day     Social History     Social History Narrative   • No narrative on file       Family History   Problem Relation Age of Onset   • Hypertension Mother    • Diabetes Mother    • Stroke Mother    • Heart Disease Mother   "  • Genetic Father         Parkinsons   • Hypertension Father    • Hyperlipidemia Father    • Hypertension Sister    • Diabetes Maternal Uncle    • Diabetes Paternal Aunt    • Diabetes Paternal Uncle    • Genetic Maternal Grandmother         kidney failure   • Arthritis Maternal Grandfather    • Heart Disease Maternal Grandfather    • Stroke Unknown    • Sleep Apnea Neg Hx         ROS:     - Constitutional: As mentioned in HPI above negative for fever, chills, unexpected weight change, and fatigue/generalized weakness.    - HEENT:  As mentioned in HPI above  Negative for headaches, vision changes, hearing changes, ear pain, ear discharge, sore throat, and neck pain.      - Respiratory: As mentioned in HPI above .      - Cardiovascular: Negative for chest pain, palpitations, orthopnea, and bilateral lower extremity edema.     - Gastrointestinal: Negative for heartburn, nausea, vomiting, abdominal pain, hematochezia, melena, diarrhea, constipation, and greasy/foul-smelling stools.     - Genitourinary: Negative for dysuria, polyuria, hematuria, pyuria, urinary urgency, and urinary incontinence.     - Musculoskeletal: Negative for myalgias, back pain, and joint pain.     - Skin: Negative for rash, itching, cyanotic skin color change.     - Neurological: Negative for dizziness, tingling, tremors, focal sensory deficit, focal weakness and headaches.     - Endo/Heme/Allergies: Does not bruise/bleed easily.     - Psychiatric/Behavioral: Negative for depression, suicidal/homicidal ideation and memory loss.      Last lab work in July 2018 reviewed and discussed with the patient.      Exam:    Blood pressure 122/74, pulse (!) 103, temperature 37.3 °C (99.1 °F), temperature source Temporal, height 1.676 m (5' 6\"), weight 92 kg (202 lb 12.8 oz), SpO2 91 %, not currently breastfeeding. Body mass index is 32.73 kg/m².    General:  Well nourished, well developed female in NAD  Head is grossly normal.  ENT exam : Ears benign, No " sinus tenderness on palpation.  Positive for mild pharyngeal erythema and cervical lymphadenopathy.  Neck: Supple without JVD or bruit. Thyroid is not enlarged.  Pulmonary: Positive for diffuse bilateral wheezes heard.  Cardiovascular: Regular rate and rhythm without murmur. Carotid and radial pulses are intact and equal bilaterally.  Extremities: no clubbing, cyanosis, or edema.    Please note that this dictation was created using voice recognition software. I have made every reasonable attempt to correct obvious errors, but I expect that there are errors of grammar and possibly content that I did not discover before finalizing the note.    Assessment/Plan:  1. Viral URI with cough  2. Exercise-induced asthma with acute exacerbation  New problem, given the duration of the history I do suspect this is viral but given the risk factors including asthma and my physical exam findings I would like to cover this with prednisone and antibiotic for 7 days also given her albuterol inhaler as needed.  Given instructions to inform me in 1 week if it does not subside we will do a chest x-ray at that time.  Which patient understood and agreed.  - predniSONE (DELTASONE) 50 MG Tab; Take 1 Tab by mouth every day.  Dispense: 7 Tab; Refill: 0  - amoxicillin-clavulanate (AUGMENTIN) 875-125 MG Tab; Take 1 Tab by mouth 2 times a day.  Dispense: 14 Tab; Refill: 0  - albuterol 108 (90 Base) MCG/ACT Aero Soln inhalation aerosol; Inhale 2 Puffs by mouth every 6 hours as needed for Shortness of Breath.  Dispense: 8.5 g; Refill: 4    3. Tachycardia  Uncontrolled, tachycardia has been ongoing in spite of starting her on the beta-blocker.  Will increase the dose to 20 mg 3 times daily.  - propranolol (INDERAL) 10 MG Tab; Take 2 Tabs by mouth 3 times a day.  Dispense: 90 Tab; Refill: 1    4. BMI 31.0-31.9,adult  5. Class 1 obesity with body mass index (BMI) of 32.0 to 32.9 in adult, unspecified obesity type, unspecified whether serious  comorbidity present  Pt educated on the increase of morbidity and mortality associated with excess weight including DM, Heart Disease, HTN, stroke, and sleep apnea.  Pt advised weight loss of 5% through reduced calorie, low fat diet and 150 mins of exercise a week  Recommend obesity clinic if patient is unsuccessful with weight loss

## 2019-01-09 DIAGNOSIS — J45.990 EXERCISE-INDUCED ASTHMA WITH ACUTE EXACERBATION: ICD-10-CM

## 2019-01-09 DIAGNOSIS — J06.9 VIRAL URI WITH COUGH: ICD-10-CM

## 2019-01-09 DIAGNOSIS — E11.42 TYPE 2 DIABETES MELLITUS WITH DIABETIC POLYNEUROPATHY, WITHOUT LONG-TERM CURRENT USE OF INSULIN (HCC): ICD-10-CM

## 2019-01-09 RX ORDER — GUAIFENESIN AND DEXTROMETHORPHAN HYDROBROMIDE 100; 10 MG/5ML; MG/5ML
10 SOLUTION ORAL EVERY 6 HOURS PRN
Qty: 840 ML | Refills: 0 | Status: SHIPPED | OUTPATIENT
Start: 2019-01-09 | End: 2019-07-26

## 2019-01-10 ENCOUNTER — HOSPITAL ENCOUNTER (OUTPATIENT)
Dept: RADIOLOGY | Facility: MEDICAL CENTER | Age: 54
End: 2019-01-10
Attending: INTERNAL MEDICINE
Payer: COMMERCIAL

## 2019-01-10 DIAGNOSIS — E11.42 TYPE 2 DIABETES MELLITUS WITH DIABETIC POLYNEUROPATHY, WITHOUT LONG-TERM CURRENT USE OF INSULIN (HCC): ICD-10-CM

## 2019-01-10 DIAGNOSIS — J45.990 EXERCISE-INDUCED ASTHMA WITH ACUTE EXACERBATION: ICD-10-CM

## 2019-01-10 DIAGNOSIS — J06.9 VIRAL URI WITH COUGH: ICD-10-CM

## 2019-01-10 PROCEDURE — 71046 X-RAY EXAM CHEST 2 VIEWS: CPT

## 2019-01-12 ENCOUNTER — OFFICE VISIT (OUTPATIENT)
Dept: URGENT CARE | Facility: PHYSICIAN GROUP | Age: 54
End: 2019-01-12
Payer: COMMERCIAL

## 2019-01-12 VITALS
WEIGHT: 202.8 LBS | HEIGHT: 66 IN | HEART RATE: 87 BPM | SYSTOLIC BLOOD PRESSURE: 104 MMHG | DIASTOLIC BLOOD PRESSURE: 70 MMHG | OXYGEN SATURATION: 95 % | BODY MASS INDEX: 32.59 KG/M2 | TEMPERATURE: 97.9 F

## 2019-01-12 DIAGNOSIS — J45.21 MILD INTERMITTENT ASTHMA WITH ACUTE EXACERBATION: ICD-10-CM

## 2019-01-12 DIAGNOSIS — R05.9 COUGH: ICD-10-CM

## 2019-01-12 PROCEDURE — 99214 OFFICE O/P EST MOD 30 MIN: CPT | Performed by: PHYSICIAN ASSISTANT

## 2019-01-12 RX ORDER — IPRATROPIUM BROMIDE AND ALBUTEROL SULFATE 2.5; .5 MG/3ML; MG/3ML
3 SOLUTION RESPIRATORY (INHALATION) ONCE
Status: COMPLETED | OUTPATIENT
Start: 2019-01-12 | End: 2019-01-12

## 2019-01-12 RX ADMIN — IPRATROPIUM BROMIDE AND ALBUTEROL SULFATE 3 ML: 2.5; .5 SOLUTION RESPIRATORY (INHALATION) at 11:17

## 2019-01-12 ASSESSMENT — ENCOUNTER SYMPTOMS
DIZZINESS: 0
SORE THROAT: 0
CARDIOVASCULAR NEGATIVE: 1
WHEEZING: 1
CHILLS: 0
SHORTNESS OF BREATH: 1
HEADACHES: 0
FEVER: 0
GASTROINTESTINAL NEGATIVE: 1
SINUS PAIN: 0
COUGH: 1
MYALGIAS: 0

## 2019-01-12 NOTE — PROGRESS NOTES
Subjective:      Thierry Dinh is a 53 y.o. female who presents with URI (difficulty breathing, cough, chest congestion, x9 days)            Cough   This is a new problem. The current episode started 1 to 4 weeks ago. The problem has been unchanged. The problem occurs every few minutes. The cough is productive of sputum. Associated symptoms include nasal congestion, shortness of breath and wheezing. Pertinent negatives include no chills, ear congestion, ear pain, fever, headaches, myalgias, postnasal drip or sore throat. She has tried oral steroids and a beta-agonist inhaler for the symptoms. The treatment provided mild relief. Her past medical history is significant for asthma and bronchitis. There is no history of pneumonia.   Patient follows up after being diagnosed with respiratory infection by her PCP.  She finished antibiotics and steroids.  She does feel much better but still feels mild tightness in her chest.  No further fevers or chills.  Requesting breathing treatment.  Patient is asthmatic.      PMH:  has a past medical history of Anxiety; Chickenpox; Divehi measles; Heart burn; Hiatus hernia syndrome; Hyperlipidemia; Hypertension; Influenza; Mumps; Pain; Psychiatric problem; Sleep apnea; and Tonsillitis.  MEDS:   Current Outpatient Prescriptions:   •  Dextromethorphan-Guaifenesin (TUSSIN DM)  MG/5ML Syrup, Take 10 mL by mouth every 6 hours as needed., Disp: 840 mL, Rfl: 0  •  propranolol (INDERAL) 10 MG Tab, Take 2 Tabs by mouth 3 times a day., Disp: 90 Tab, Rfl: 1  •  predniSONE (DELTASONE) 50 MG Tab, Take 1 Tab by mouth every day., Disp: 7 Tab, Rfl: 0  •  amoxicillin-clavulanate (AUGMENTIN) 875-125 MG Tab, Take 1 Tab by mouth 2 times a day., Disp: 14 Tab, Rfl: 0  •  albuterol 108 (90 Base) MCG/ACT Aero Soln inhalation aerosol, Inhale 2 Puffs by mouth every 6 hours as needed for Shortness of Breath., Disp: 8.5 g, Rfl: 4  •  zolpidem (AMBIEN) 5 MG Tab, Take one tab QHS as needed for sleep.,  Disp: 30 Tab, Rfl: 2  •  JANUMET XR  MG TABLET SR 24 HR, TAKE 1 TABLET BY MOUTH ONCE DAILY, Disp: 90 Tab, Rfl: 1  •  venlafaxine (EFFEXOR) 75 MG Tab, TAKE 1 TABLET BY MOUTH TWICE DAILY WITH FOOD, Disp: 180 Tab, Rfl: 1  •  ezetimibe (ZETIA) 10 MG Tab, TAKE 1 TABLET BY MOUTH ONCE DAILY, Disp: 90 Tab, Rfl: 1  •  losartan (COZAAR) 50 MG Tab, Take 1 Tab by mouth every day., Disp: 30 Tab, Rfl: 5  •  naproxen (NAPROSYN) 500 MG Tab, Take 1 Tab by mouth 2 times a day, with meals., Disp: 90 Tab, Rfl: 0  •  oxybutynin SR (DITROPAN-XL) 10 MG CR tablet, Take 1 Tab by mouth every day., Disp: 90 Tab, Rfl: 1  •  fluticasone (FLONASE) 50 MCG/ACT nasal spray, Spray 1 Spray in nose 2 times a day. Each nostril, Disp: 3 Bottle, Rfl: 3  •  diazepam (VALIUM) 5 MG Tab, Take 1 Tab by mouth every 8 hours as needed for Anxiety. Followup visit for additional renewals, Disp: 60 Tab, Rfl: 2  •  Misc. Devices Kit, Glucometer (brand as accepted by insurance). To test fasting in the AM, Disp: 1 Kit, Rfl: 0  •  Lancet Device Misc, MATCH TO CURRENT GLUCOMETER, To test fasting in the AM, Disp: 100 Each, Rfl: 4  •  Blood Glucose Monitoring Suppl SUPPLIES Misc, Test Strips (brand as accepted by insurance)Sig: Use AM fasting., Disp: 100 Each, Rfl: 4  ALLERGIES:   Allergies   Allergen Reactions   • Nkda [No Known Drug Allergy]      SURGHX:   Past Surgical History:   Procedure Laterality Date   • HIATAL HERNIA REPAIR  6/16/2011    Performed by RAMSES GALVEZ at SUR[facility]   • LAPAROSCOPY  6/16/2011    Performed by RAMSES GALVEZ at [facility]   • LAPAROSCOPY  4/4/2011    Lap band revision:Performed by RAMSES GALVEZ at [facility]   • COLONOSCOPY  2008   • GASTRIC BANDING LAPAROSCOPIC  10/2007   • OTHER SURGICAL PROCEDURE  2003    uterine ablation   • TUBAL COAGULATION LAPAROSCOPIC BILATERAL  1995   • ENDOMETRIAL ABLATION       SOCHX:  reports that she has never smoked. She has never used smokeless tobacco.  "She reports that she drinks about 1.2 oz of alcohol per week . She reports that she does not use drugs.  FH: family history includes Arthritis in her maternal grandfather; Diabetes in her maternal uncle, mother, paternal aunt, and paternal uncle; Genetic in her father and maternal grandmother; Heart Disease in her maternal grandfather and mother; Hyperlipidemia in her father; Hypertension in her father, mother, and sister; Stroke in her mother and unknown relative.      Review of Systems   Constitutional: Positive for malaise/fatigue. Negative for chills and fever.   HENT: Positive for congestion. Negative for ear pain, postnasal drip, sinus pain and sore throat.    Respiratory: Positive for cough, shortness of breath and wheezing.    Cardiovascular: Negative.    Gastrointestinal: Negative.    Musculoskeletal: Negative for joint pain and myalgias.   Neurological: Negative for dizziness and headaches.       Medications, Allergies, and current problem list reviewed today in Epic     Objective:     /70 (BP Location: Right arm, Patient Position: Sitting, BP Cuff Size: Large adult)   Pulse 87   Temp 36.6 °C (97.9 °F) (Temporal)   Ht 1.676 m (5' 6\")   Wt 92 kg (202 lb 12.8 oz)   SpO2 95%   BMI 32.73 kg/m²      Physical Exam   Constitutional: She is oriented to person, place, and time. She appears well-developed and well-nourished. No distress.   HENT:   Head: Normocephalic and atraumatic.   Right Ear: Tympanic membrane and external ear normal.   Left Ear: Tympanic membrane and external ear normal.   Nose: Nose normal.   Mouth/Throat: Oropharynx is clear and moist. No oropharyngeal exudate.   Eyes: Conjunctivae are normal. Right eye exhibits no discharge. Left eye exhibits no discharge.   Neck: Normal range of motion. Neck supple.   Cardiovascular: Normal rate, regular rhythm and normal heart sounds.    Pulmonary/Chest: Effort normal. No respiratory distress. She has decreased breath sounds. She has no wheezes. " She has no rhonchi. She has no rales.   Musculoskeletal: Normal range of motion.   Lymphadenopathy:     She has no cervical adenopathy.   Neurological: She is alert and oriented to person, place, and time.   Skin: Skin is warm and dry. She is not diaphoretic.   Psychiatric: She has a normal mood and affect. Her behavior is normal. Judgment and thought content normal.   Nursing note and vitals reviewed.              Assessment/Plan:     1. Cough  ipratropium-albuterol (DUONEB) nebulizer solution   2. Mild intermittent asthma with acute exacerbation  ipratropium-albuterol (DUONEB) nebulizer solution     Lungs clear bilaterally.  Mildly decreased breath sounds with PO2 adequate.  Patient has no infectious symptoms and is much improved.  Possible post infectious pulmonary inflammation.  She was much improved after the DuoNeb treatment.  Continue albuterol.  Return to clinic or follow-up with PCP for any returning or changing symptoms.  OTC meds and conservative measures as discussed  Return to clinic or go to ED if symptoms worsen or persist. Indications for ED discussed at length. Patient voices understanding. Follow-up with your primary care provider in 3-5 days. Red flags discussed. All side effects of medication discussed including allergic response, GI upset, tendon injury, etc.    Please note that this dictation was created using voice recognition software. I have made every reasonable attempt to correct obvious errors, but I expect that there are errors of grammar and possibly content that I did not discover before finalizing the note.

## 2019-01-16 DIAGNOSIS — J06.9 VIRAL URI WITH COUGH: ICD-10-CM

## 2019-01-16 DIAGNOSIS — J45.990 EXERCISE-INDUCED ASTHMA WITH ACUTE EXACERBATION: ICD-10-CM

## 2019-01-16 RX ORDER — IPRATROPIUM BROMIDE AND ALBUTEROL SULFATE 2.5; .5 MG/3ML; MG/3ML
3 SOLUTION RESPIRATORY (INHALATION) 4 TIMES DAILY
Qty: 30 BULLET | Refills: 2 | Status: SHIPPED | OUTPATIENT
Start: 2019-01-16

## 2019-01-17 DIAGNOSIS — J45.990 EXERCISE-INDUCED ASTHMA WITH ACUTE EXACERBATION: ICD-10-CM

## 2019-01-17 DIAGNOSIS — J06.9 VIRAL URI WITH COUGH: ICD-10-CM

## 2019-01-20 DIAGNOSIS — I10 ESSENTIAL HYPERTENSION: ICD-10-CM

## 2019-01-21 RX ORDER — LOSARTAN POTASSIUM 50 MG/1
TABLET ORAL
Qty: 90 TAB | Refills: 1 | Status: SHIPPED | OUTPATIENT
Start: 2019-01-21 | End: 2019-07-26 | Stop reason: SDUPTHER

## 2019-01-21 NOTE — TELEPHONE ENCOUNTER
Was the patient seen in the last year in this department? Yes    Does patient have an active prescription for medications requested? No     Received Request Via: Pharmacy      Pt met protocol?: Yes, OV earlier this month   BP Readings from Last 1 Encounters:   01/12/19 104/70

## 2019-01-21 NOTE — TELEPHONE ENCOUNTER
Refill X 6 months, sent to pharmacy.Pt. Seen in the last 6 months per protocol.   Lab Results   Component Value Date/Time    SODIUM 140 07/30/2018 07:05 AM    POTASSIUM 4.4 07/30/2018 07:05 AM    CHLORIDE 106 07/30/2018 07:05 AM    CO2 25 07/30/2018 07:05 AM    GLUCOSE 129 (H) 07/30/2018 07:05 AM    BUN 17 07/30/2018 07:05 AM    CREATININE 0.82 07/30/2018 07:05 AM

## 2019-02-08 ENCOUNTER — OFFICE VISIT (OUTPATIENT)
Dept: MEDICAL GROUP | Facility: PHYSICIAN GROUP | Age: 54
End: 2019-02-08
Payer: COMMERCIAL

## 2019-02-08 ENCOUNTER — HOSPITAL ENCOUNTER (OUTPATIENT)
Dept: LAB | Facility: MEDICAL CENTER | Age: 54
End: 2019-02-08
Attending: INTERNAL MEDICINE
Payer: COMMERCIAL

## 2019-02-08 VITALS
WEIGHT: 204.4 LBS | HEIGHT: 66 IN | OXYGEN SATURATION: 96 % | TEMPERATURE: 98.6 F | SYSTOLIC BLOOD PRESSURE: 120 MMHG | DIASTOLIC BLOOD PRESSURE: 70 MMHG | BODY MASS INDEX: 32.85 KG/M2 | HEART RATE: 108 BPM

## 2019-02-08 DIAGNOSIS — G47.33 OBSTRUCTIVE SLEEP APNEA: ICD-10-CM

## 2019-02-08 DIAGNOSIS — E78.5 HYPERLIPIDEMIA ASSOCIATED WITH TYPE 2 DIABETES MELLITUS (HCC): ICD-10-CM

## 2019-02-08 DIAGNOSIS — E66.9 CLASS 1 OBESITY WITH BODY MASS INDEX (BMI) OF 32.0 TO 32.9 IN ADULT, UNSPECIFIED OBESITY TYPE, UNSPECIFIED WHETHER SERIOUS COMORBIDITY PRESENT: ICD-10-CM

## 2019-02-08 DIAGNOSIS — E11.42 TYPE 2 DIABETES MELLITUS WITH DIABETIC POLYNEUROPATHY, WITHOUT LONG-TERM CURRENT USE OF INSULIN (HCC): ICD-10-CM

## 2019-02-08 DIAGNOSIS — R00.0 TACHYCARDIA: ICD-10-CM

## 2019-02-08 DIAGNOSIS — J45.990 EXERCISE-INDUCED ASTHMA: ICD-10-CM

## 2019-02-08 DIAGNOSIS — E11.69 HYPERLIPIDEMIA ASSOCIATED WITH TYPE 2 DIABETES MELLITUS (HCC): ICD-10-CM

## 2019-02-08 PROBLEM — J06.9 VIRAL URI WITH COUGH: Status: RESOLVED | Noted: 2019-01-03 | Resolved: 2019-02-08

## 2019-02-08 LAB
CHOLEST SERPL-MCNC: 212 MG/DL (ref 100–199)
EST. AVERAGE GLUCOSE BLD GHB EST-MCNC: 154 MG/DL
FASTING STATUS PATIENT QL REPORTED: NORMAL
HBA1C MFR BLD: 7 % (ref 0–5.6)
HDLC SERPL-MCNC: 44 MG/DL
LDLC SERPL CALC-MCNC: 127 MG/DL
TRIGL SERPL-MCNC: 203 MG/DL (ref 0–149)

## 2019-02-08 PROCEDURE — 99214 OFFICE O/P EST MOD 30 MIN: CPT | Performed by: INTERNAL MEDICINE

## 2019-02-08 PROCEDURE — 36415 COLL VENOUS BLD VENIPUNCTURE: CPT

## 2019-02-08 PROCEDURE — 83036 HEMOGLOBIN GLYCOSYLATED A1C: CPT

## 2019-02-08 PROCEDURE — 80061 LIPID PANEL: CPT

## 2019-02-08 RX ORDER — FENOFIBRATE 48 MG/1
48 TABLET, COATED ORAL DAILY
Qty: 90 TAB | Refills: 1 | Status: SHIPPED | OUTPATIENT
Start: 2019-02-08 | End: 2019-08-11 | Stop reason: SDUPTHER

## 2019-02-08 RX ORDER — VENLAFAXINE 75 MG/1
TABLET ORAL
Qty: 180 TAB | Refills: 1 | Status: SHIPPED | OUTPATIENT
Start: 2019-02-08 | End: 2020-01-21

## 2019-02-08 RX ORDER — PROPRANOLOL HYDROCHLORIDE 10 MG/1
10 TABLET ORAL 3 TIMES DAILY
Qty: 90 TAB | Refills: 1 | Status: SHIPPED | OUTPATIENT
Start: 2019-02-08 | End: 2019-10-03

## 2019-02-08 NOTE — ASSESSMENT & PLAN NOTE
This is a chronic health problem that is well controlled with current medications and lifestyle measures. On [employer] , with AHI <5 .

## 2019-02-08 NOTE — PROGRESS NOTES
CC: Follow-up visit, diabetes mellitus.    HISTORY OF PRESENT ILLNESS: Patient is a 53 y.o. female established patient who presents today to discuss her medical conditions as mentioned in HPI below.    Health Maintenance: Due for shingles vaccine and patient requesting for a pelvic exam for next visit.    Type 2 diabetes mellitus with diabetic polyneuropathy, without long-term current use of insulin (CMS-HCC) ([facility])  This is a chronic health problem that is uncontrolled with current medications and lifestyle measures. Last HBA1C in 06/2018 was 6.7% , On Janumet 500 mg daily. B.G ranges between 180's - 130's.    Obstructive sleep apnea  This is a chronic health problem that is well controlled with current medications and lifestyle measures. On [employer] , with AHI <5 .     Exercise-induced asthma  This is a chronic health problem that is uncontrolled with current medications and lifestyle measures. On and off flare ups with PRN albuterol. Never got diagnosed.      PHQ score 0, BMI > 32 , no tobacco, no fall injuries.    Patient Active Problem List    Diagnosis Date Noted   • Exercise-induced asthma 01/03/2019   • Mixed stress and urge urinary incontinence 05/31/2018   • Tachycardia 05/31/2018   • Healthcare maintenance 04/13/2018   • Left hip pain 02/16/2018   • Type 2 diabetes mellitus with diabetic polyneuropathy, without long-term current use of insulin ([facility]) 10/27/2016   • BMI 32.0-32.9,adult 10/27/2016   • Obstructive sleep apnea 10/12/2016   • Insomnia 10/12/2016   • Essential hypertension 01/17/2012   • Hyperlipidemia associated with type 2 diabetes mellitus ([facility]) 06/02/2010   • Depression with anxiety 06/17/2009   • Allergic rhinitis 06/17/2009      Allergies:Nkda [no known drug allergy]    Current Outpatient Prescriptions   Medication Sig Dispense Refill   • propranolol (INDERAL) 10 MG Tab Take 1 Tab by mouth 3 times a day. 90 Tab 1   • venlafaxine (EFFEXOR) 75 MG Tab TAKE 1 TABLET BY MOUTH TWICE  DAILY WITH FOOD 180 Tab 1   • losartan (COZAAR) 50 MG Tab TAKE 1 TABLET BY MOUTH ONCE DAILY 90 Tab 1   • NON SPECIFIED PLEASE PROVIDE NEBULIZER MACHINE 1 Each 0   • ipratropium-albuterol (DUONEB) 0.5-2.5 (3) MG/3ML nebulizer solution 3 mL by Nebulization route 4 times a day. 30 Bullet 2   • Dextromethorphan-Guaifenesin (TUSSIN DM)  MG/5ML Syrup Take 10 mL by mouth every 6 hours as needed. 840 mL 0   • albuterol 108 (90 Base) MCG/ACT Aero Soln inhalation aerosol Inhale 2 Puffs by mouth every 6 hours as needed for Shortness of Breath. 8.5 g 4   • JANUMET XR  MG TABLET SR 24 HR TAKE 1 TABLET BY MOUTH ONCE DAILY 90 Tab 1   • ezetimibe (ZETIA) 10 MG Tab TAKE 1 TABLET BY MOUTH ONCE DAILY 90 Tab 1   • naproxen (NAPROSYN) 500 MG Tab Take 1 Tab by mouth 2 times a day, with meals. 90 Tab 0   • oxybutynin SR (DITROPAN-XL) 10 MG CR tablet Take 1 Tab by mouth every day. 90 Tab 1   • fluticasone (FLONASE) 50 MCG/ACT nasal spray Spray 1 Spray in nose 2 times a day. Each nostril 3 Bottle 3   • diazepam (VALIUM) 5 MG Tab Take 1 Tab by mouth every 8 hours as needed for Anxiety. Followup visit for additional renewals 60 Tab 2   • Misc. Devices Kit Glucometer (brand as accepted by insurance). To test fasting in the AM 1 Kit 0   • Lancet Device Misc MATCH TO CURRENT GLUCOMETER, To test fasting in the  Each 4   • Blood Glucose Monitoring Suppl SUPPLIES Misc Test Strips (brand as accepted by insurance)Sig: Use AM fasting. 100 Each 4     No current facility-administered medications for this visit.        Social History   Substance Use Topics   • Smoking status: Never Smoker   • Smokeless tobacco: Never Used      Comment: avoid all tobacco products   • Alcohol use 1.2 oz/week     2 Glasses of wine per week      Comment: two per day     Social History     Social History Narrative   • No narrative on file       Family History   Problem Relation Age of Onset   • Hypertension Mother    • Diabetes Mother    • Stroke Mother    •  "Heart Disease Mother    • Genetic Father         Parkinsons   • Hypertension Father    • Hyperlipidemia Father    • Hypertension Sister    • Diabetes Maternal Uncle    • Diabetes Paternal Aunt    • Diabetes Paternal Uncle    • Genetic Maternal Grandmother         kidney failure   • Arthritis Maternal Grandfather    • Heart Disease Maternal Grandfather    • Stroke Unknown    • Sleep Apnea Neg Hx         ROS:     - Constitutional:  Negative for fever, chills, unexpected weight change, and fatigue/generalized weakness.    - HEENT:  Negative for headaches, vision changes, hearing changes, ear pain, ear discharge, rhinorrhea, sinus congestion, sore throat, and neck pain.      - Respiratory: Negative for cough, sputum production, chest congestion, dyspnea, wheezing, and crackles.      - Cardiovascular: Negative for chest pain, palpitations, orthopnea, and bilateral lower extremity edema.     - Gastrointestinal: Negative for heartburn, nausea, vomiting, abdominal pain, hematochezia, melena, diarrhea, constipation, and greasy/foul-smelling stools.     - Genitourinary: Negative for dysuria, polyuria, hematuria, pyuria, urinary urgency, and urinary incontinence.     - Musculoskeletal: Negative for myalgias, back pain, and joint pain.     - Skin: Negative for rash, itching, cyanotic skin color change.     - Neurological: Negative for dizziness, tingling, tremors, focal sensory deficit, focal weakness and headaches.     - Endo/Heme/Allergies: Does not bruise/bleed easily.     - Psychiatric/Behavioral: Negative for depression, suicidal/homicidal ideation and memory loss.      Last lab work in December 2018 reviewed and discussed with patient.      Exam:    Blood pressure 120/70, pulse (!) 108, temperature 37 °C (98.6 °F), temperature source Temporal, height 1.676 m (5' 6\"), weight 92.7 kg (204 lb 6.4 oz), SpO2 96 %, not currently breastfeeding. Body mass index is 32.99 kg/m².    General:  Well nourished, well developed female in " NAD  Head is grossly normal.  Neck: Supple without JVD or bruit. Thyroid is not enlarged.  Pulmonary: Clear to ausculation and percussion.  Normal effort. No rales, ronchi, or wheezing.  Cardiovascular: Regular rate and rhythm without murmur. Carotid and radial pulses are intact and equal bilaterally.  Extremities: no clubbing, cyanosis, or edema.    Please note that this dictation was created using voice recognition software. I have made every reasonable attempt to correct obvious errors, but I expect that there are errors of grammar and possibly content that I did not discover before finalizing the note.    Assessment/Plan:  1. Type 2 diabetes mellitus with diabetic polyneuropathy, without long-term current use of insulin (HCC)  Borderline elevated HbA1c 6.7% in the last check in June 2018.  Will need to recheck at this time.  For now continue current Janumet 50/500 mg extended release once a day.  - HEMOGLOBIN A1C; Future    2. Hyperlipidemia associated with type 2 diabetes mellitus (HCC)  Uncontrolled, last checked in February 2018, continue current Zetia 10 mg daily.  Will recheck and adjust the dose if needed.  - Lipid Profile; Future    3. Tachycardia  Uncontrolled, patient has self managed and came down on the propranolol on one occasion when she had low blood pressure.  It was being taken at propranolol 10 mg daily but the heart rate went up again to 108, will increase the dose to propranolol 10 mg 3 times daily.  Given instructions to check her blood pressure before she takes propranolol.  - propranolol (INDERAL) 10 MG Tab; Take 1 Tab by mouth 3 times a day.  Dispense: 90 Tab; Refill: 1    4. BMI 32.0-32.9,adult  5. Class 1 obesity with body mass index (BMI) of 32.0 to 32.9 in adult, unspecified obesity type, unspecified whether serious comorbidity present  Pt educated on the increase of morbidity and mortality associated with excess weight including DM, Heart Disease, HTN, stroke, and sleep apnea.  Pt  advised weight loss of 5% through reduced calorie, low fat diet and 150 mins of exercise a week  Recommend obesity clinic if patient is unsuccessful with weight loss    6. Obstructive sleep apnea  Well-controlled, continue current sleep appliance with a goal of AHI less than 5.    7. Exercise-induced asthma  Never diagnosed, ongoing problem.  Will give a bronchial challenge test with methacholine for diagnosis.  - Bronchial Challenge with Methacholine; Future

## 2019-02-08 NOTE — ASSESSMENT & PLAN NOTE
This is a chronic health problem that is uncontrolled with current medications and lifestyle measures. On and off flare ups with PRN albuterol. Never got diagnosed.

## 2019-02-08 NOTE — ASSESSMENT & PLAN NOTE
This is a chronic health problem that is uncontrolled with current medications and lifestyle measures. Last HBA1C in 06/2018 was 6.7% , On Janumet 500 mg daily. B.G ranges between 180's - 130's.

## 2019-03-08 ENCOUNTER — HOSPITAL ENCOUNTER (OUTPATIENT)
Facility: MEDICAL CENTER | Age: 54
End: 2019-03-08
Attending: INTERNAL MEDICINE
Payer: COMMERCIAL

## 2019-03-08 ENCOUNTER — OFFICE VISIT (OUTPATIENT)
Dept: MEDICAL GROUP | Facility: PHYSICIAN GROUP | Age: 54
End: 2019-03-08
Payer: COMMERCIAL

## 2019-03-08 VITALS
WEIGHT: 200 LBS | SYSTOLIC BLOOD PRESSURE: 118 MMHG | HEIGHT: 66 IN | DIASTOLIC BLOOD PRESSURE: 80 MMHG | OXYGEN SATURATION: 95 % | BODY MASS INDEX: 32.14 KG/M2 | TEMPERATURE: 98.3 F | HEART RATE: 93 BPM

## 2019-03-08 DIAGNOSIS — Z12.4 PAP SMEAR FOR CERVICAL CANCER SCREENING: ICD-10-CM

## 2019-03-08 PROCEDURE — 88175 CYTOPATH C/V AUTO FLUID REDO: CPT

## 2019-03-08 PROCEDURE — 87624 HPV HI-RISK TYP POOLED RSLT: CPT

## 2019-03-08 PROCEDURE — 99396 PREV VISIT EST AGE 40-64: CPT | Performed by: INTERNAL MEDICINE

## 2019-03-08 NOTE — PROGRESS NOTES
SUBJECTIVE: 53 y.o. female for annual routine gynecologic exam  Chief Complaint   Patient presents with   • Gynecologic Exam       Obstetric History       T1      L0     SAB0   TAB0   Ectopic0   Molar0   Multiple0   Live Births0       Last Pap: 3 yrs back, normal  History   Sexual Activity   • Sexual activity: Yes   • Partners: Male     Sexual history: denies being sexually active   H/O Abnormal Pap yes , More than 10 yrs back and later on it was normal.  She  reports that she has never smoked. She has never used smokeless tobacco.        Allergies: Nkda [no known drug allergy]     ROS:    Reports none menopause symptoms of hot flashes, night sweats, sleep disruption, mood changes.Reports vaginal dryness.   No significant bloating/fluid retention, pelvic pain, or dyspareunia. No vaginal discharge   No breast tenderness, mass, nipple discharge, changes in size or contour, or abnormal cyclic discomfort.  No urinary tract symptoms, no incontinence, no polydipsia, polyuria,  No abdominal pain, change in bowel habits, black or bloody stools.    No unusual headaches, no visual changes, menstrual migraines   No prolonged cough. No dyspnea or chest pain on exertion.  No depression, labile mood, anxiety, libido changes, insomnia.  No temperature intolerance.  No new/concerning skin lesions, concerns.     Exercise: no regular exercise program  Preventive Care: Uptodate    Current medicines (including changes today)  Current Outpatient Prescriptions   Medication Sig Dispense Refill   • propranolol (INDERAL) 10 MG Tab Take 1 Tab by mouth 3 times a day. 90 Tab 1   • venlafaxine (EFFEXOR) 75 MG Tab TAKE 1 TABLET BY MOUTH TWICE DAILY WITH FOOD 180 Tab 1   • fenofibrate (TRICOR) 48 MG Tab Take 1 Tab by mouth every day. 90 Tab 1   • SitaGLIPtin-MetFORMIN HCl (JANUMET XR)  MG TABLET SR 24 HR Take 1 Tab by mouth every day. 90 Tab 1   • losartan (COZAAR) 50 MG Tab TAKE 1 TABLET BY MOUTH ONCE DAILY 90 Tab 1   •  ezetimibe (ZETIA) 10 MG Tab TAKE 1 TABLET BY MOUTH ONCE DAILY 90 Tab 1   • oxybutynin SR (DITROPAN-XL) 10 MG CR tablet Take 1 Tab by mouth every day. 90 Tab 1   • Lancet Device Misc MATCH TO CURRENT GLUCOMETER, To test fasting in the  Each 4   • Blood Glucose Monitoring Suppl SUPPLIES Misc Test Strips (brand as accepted by insurance)Sig: Use AM fasting. 100 Each 4   • NON SPECIFIED PLEASE PROVIDE NEBULIZER MACHINE 1 Each 0   • ipratropium-albuterol (DUONEB) 0.5-2.5 (3) MG/3ML nebulizer solution 3 mL by Nebulization route 4 times a day. 30 Bullet 2   • Dextromethorphan-Guaifenesin (TUSSIN DM)  MG/5ML Syrup Take 10 mL by mouth every 6 hours as needed. 840 mL 0   • albuterol 108 (90 Base) MCG/ACT Aero Soln inhalation aerosol Inhale 2 Puffs by mouth every 6 hours as needed for Shortness of Breath. 8.5 g 4   • naproxen (NAPROSYN) 500 MG Tab Take 1 Tab by mouth 2 times a day, with meals. 90 Tab 0   • fluticasone (FLONASE) 50 MCG/ACT nasal spray Spray 1 Spray in nose 2 times a day. Each nostril 3 Bottle 3   • diazepam (VALIUM) 5 MG Tab Take 1 Tab by mouth every 8 hours as needed for Anxiety. Followup visit for additional renewals 60 Tab 2   • Misc. Devices Kit Glucometer (brand as accepted by insurance). To test fasting in the AM 1 Kit 0     No current facility-administered medications for this visit.      She  has a past medical history of Anxiety; Chickenpox; [place] measles; Heart burn; Hiatus hernia syndrome; Hyperlipidemia; Hypertension; Influenza; Mumps; Pain; Psychiatric problem; Sleep apnea; and Tonsillitis.  She  has a past surgical history that includes tubal coagulation laparoscopic bilateral (1995); gastric banding laparoscopic (10/2007); other surgical procedure (2003); colonoscopy (2008); laparoscopy (4/4/2011); hiatal hernia repair (6/16/2011); laparoscopy (6/16/2011); and endometrial ablation.     Family History:   Family History   Problem Relation Age of Onset   • Hypertension Mother    •  "Diabetes Mother    • Stroke Mother    • Heart Disease Mother    • Genetic Father         Parkinsons   • Hypertension Father    • Hyperlipidemia Father    • Hypertension Sister    • Diabetes Maternal Uncle    • Diabetes Paternal Aunt    • Diabetes Paternal Uncle    • Genetic Maternal Grandmother         kidney failure   • Arthritis Maternal Grandfather    • Heart Disease Maternal Grandfather    • Stroke Unknown    • Sleep Apnea Neg Hx        OBJECTIVE:   /80 (BP Location: Left arm, Patient Position: Sitting, BP Cuff Size: Large adult)   Pulse 93   Temp 36.8 °C (98.3 °F) (Temporal)   Ht 1.676 m (5' 6\")   Wt 90.7 kg (200 lb)   SpO2 95%   BMI 32.28 kg/m²   Body mass index is 32.28 kg/m².    HEAD AND NECK:  Ears normal.  Throat, oral cavity and tongue normal.  Neck supple. No adenopathy or masses in the neck or supraclavicular regions.  No carotid bruits. No thyromegaly. NEURO: Cranial nerves are normal. DTR's normal and symmetric.  CHEST:  Clear, good air entry, no wheezes or rales. HEART:  Regular rate and rhythm.  S1 and S2 normal.  No edema or JVD. ABDOMEN:  Soft without tenderness, guarding, mass or organomegaly.  No CVA tenderness or inguinal adenopathy. EXTREMITIES:  Extremities, reflexes and peripheral pulses are normal. SKIN: color normal, vascularity normal, no edema, temperature normal   No rashes or suspicious skin lesions noted.     Breast Exam: Performed with instruction during examination. No axillary lymphadenopathy, no skin changes, no dominant masses. No nipple retraction  Pelvic Exam -  Normal external genitalia with no lesions. Normal vaginal mucosa with normal rugation and no discharge. Cervix with no visible lesions. No cervical motion tenderness. Uterus is normal sized with no masses. No adnexal tenderness or enlargement appreciated. Thin Prep Pap is obtained, vaginal swab is not obtained and specimen(s) sent to lab  Rectal: deferred    <ASSESSMENT and PLAN>  1. Pap smear for cervical " cancer screening  - THINPREP PAP WITH HPV; Future    Discussed  breast self exam, menopause, osteoporosis, adequate intake of calcium and vitamin D, diet and exercise, Kegel's exercises   Follow-up in 1 years for next Gyn exam and 3 years for next Pap.   Next office visit for recheck of chronic medical conditions is due in 1 months

## 2019-03-09 DIAGNOSIS — Z12.4 PAP SMEAR FOR CERVICAL CANCER SCREENING: ICD-10-CM

## 2019-03-11 LAB
CYTOLOGY REG CYTOL: NORMAL
HPV HR 12 DNA CVX QL NAA+PROBE: NEGATIVE
HPV16 DNA SPEC QL NAA+PROBE: NEGATIVE
HPV18 DNA SPEC QL NAA+PROBE: NEGATIVE
SPECIMEN SOURCE: NORMAL

## 2019-04-17 DIAGNOSIS — F41.8 DEPRESSION WITH ANXIETY: ICD-10-CM

## 2019-07-26 ENCOUNTER — OFFICE VISIT (OUTPATIENT)
Dept: MEDICAL GROUP | Facility: PHYSICIAN GROUP | Age: 54
End: 2019-07-26
Payer: COMMERCIAL

## 2019-07-26 VITALS
SYSTOLIC BLOOD PRESSURE: 116 MMHG | HEART RATE: 96 BPM | TEMPERATURE: 96.8 F | BODY MASS INDEX: 32.95 KG/M2 | OXYGEN SATURATION: 96 % | DIASTOLIC BLOOD PRESSURE: 82 MMHG | WEIGHT: 205 LBS | HEIGHT: 66 IN

## 2019-07-26 DIAGNOSIS — E11.8 TYPE 2 DIABETES MELLITUS WITH COMPLICATION, WITHOUT LONG-TERM CURRENT USE OF INSULIN (HCC): ICD-10-CM

## 2019-07-26 DIAGNOSIS — F41.0 PANIC ATTACKS: ICD-10-CM

## 2019-07-26 DIAGNOSIS — F41.8 DEPRESSION WITH ANXIETY: ICD-10-CM

## 2019-07-26 DIAGNOSIS — E66.9 CLASS 1 OBESITY WITH BODY MASS INDEX (BMI) OF 33.0 TO 33.9 IN ADULT, UNSPECIFIED OBESITY TYPE, UNSPECIFIED WHETHER SERIOUS COMORBIDITY PRESENT: ICD-10-CM

## 2019-07-26 DIAGNOSIS — Z78.0 MENOPAUSE: ICD-10-CM

## 2019-07-26 DIAGNOSIS — E66.9 OBESITY (BMI 30-39.9): ICD-10-CM

## 2019-07-26 DIAGNOSIS — I10 ESSENTIAL HYPERTENSION: ICD-10-CM

## 2019-07-26 PROCEDURE — 92250 FUNDUS PHOTOGRAPHY W/I&R: CPT | Mod: TC | Performed by: INTERNAL MEDICINE

## 2019-07-26 PROCEDURE — 99214 OFFICE O/P EST MOD 30 MIN: CPT | Performed by: INTERNAL MEDICINE

## 2019-07-26 RX ORDER — DIAZEPAM 5 MG/1
5 TABLET ORAL
Qty: 10 TAB | Refills: 0 | Status: SHIPPED | OUTPATIENT
Start: 2019-07-26 | End: 2019-08-25

## 2019-07-26 NOTE — ASSESSMENT & PLAN NOTE
This is a chronic health problem that is uncontrolled with current medications and lifestyle measures. Pt had previous Blader sling surgery in 11/2018 and follows [facility].

## 2019-07-26 NOTE — ASSESSMENT & PLAN NOTE
This is a new problem which patient mentions has been having since 1 year with severe night sweats. Worsening at this time irritability, hot flashes, depression and requesting for help. Denies any vaginal dryness.  Denies any history of breast and uterine cancers in her or family.

## 2019-07-26 NOTE — TELEPHONE ENCOUNTER
Was the patient seen in the last year in this department? Yes    Does patient have an active prescription for medications requested? No     Received Request Via: Pharmacy      Pt met protocol?: NO, NEXT APPT WITH PCP 12/05/2019    LAST OV 07/26/2019    BP Readings from Last 1 Encounters:   07/26/19 116/82     Lab Results   Component Value Date/Time    CHOLSTRLTOT 212 (H) 02/08/2019 08:55 AM     (H) 02/08/2019 08:55 AM    HDL 44 02/08/2019 08:55 AM    TRIGLYCERIDE 203 (H) 02/08/2019 08:55 AM       Lab Results   Component Value Date/Time    SODIUM 140 07/30/2018 07:05 AM    POTASSIUM 4.4 07/30/2018 07:05 AM    CHLORIDE 106 07/30/2018 07:05 AM    CO2 25 07/30/2018 07:05 AM    GLUCOSE 129 (H) 07/30/2018 07:05 AM    BUN 17 07/30/2018 07:05 AM    CREATININE 0.82 07/30/2018 07:05 AM     Lab Results   Component Value Date/Time    ALKPHOSPHAT 48 07/30/2018 07:05 AM    ASTSGOT 20 07/30/2018 07:05 AM    ALTSGPT 15 07/30/2018 07:05 AM    TBILIRUBIN 0.8 07/30/2018 07:05 AM

## 2019-07-26 NOTE — ASSESSMENT & PLAN NOTE
This is a chronic health problem that is uncontrolled with current medications and lifestyle measures. Patient says that she has been having on and off panic attacks and requesting for refills of Valium which was prescribed by previous providers. Follows psychotherapist Dr.Steven Cervantes in the past last being 2 yrs back and not following him anymore.

## 2019-07-26 NOTE — PROGRESS NOTES
CC: Follow-up visit, menopause.    HISTORY OF PRESENT ILLNESS: Patient is a 54 y.o. female established patient who presents today to discuss her medical conditions as mentioned in HPI below.    Health Maintenance: Due for diabetic maintenance exams will be done in the office today.    Menopause  This is a new problem which patient mentions has been having since 1 year with severe night sweats. Worsening at this time irritability, hot flashes, depression and requesting for help. Denies any vaginal dryness.  Denies any history of breast and uterine cancers in her or family.    Panic attacks  This is a chronic health problem that is uncontrolled with current medications and lifestyle measures. Patient says that she has been having on and off panic attacks and requesting for refills of Valium which was prescribed by previous providers. Follows psychotherapist Dr.Steven Cervantes in the past last being 2 yrs back and not following him anymore.     Mixed stress and urge urinary incontinence  This is a chronic health problem that is uncontrolled with current medications and lifestyle measures. Pt had previous Blader sling surgery in 11/2018 and follows [facility].      PHQ score 0, BMI > 33 , no tobacco, no fall injuries.    Patient Active Problem List    Diagnosis Date Noted   • Obesity (BMI 30-39.9) 07/26/2019   • Menopause 07/26/2019   • Panic attacks 07/26/2019   • Exercise-induced asthma 01/03/2019   • Mixed stress and urge urinary incontinence 05/31/2018   • Tachycardia 05/31/2018   • Healthcare maintenance 04/13/2018   • Left hip pain 02/16/2018   • Type 2 diabetes mellitus with diabetic polyneuropathy, without long-term current use of insulin (HCC) 10/27/2016   • BMI 33.0-33.9,adult 10/27/2016   • Obstructive sleep apnea 10/12/2016   • Insomnia 10/12/2016   • Essential hypertension 01/17/2012   • Hyperlipidemia associated with type 2 diabetes mellitus (HCC) 06/02/2010   • Recurrent major depressive disorder, in  remission (HCC) 06/17/2009   • Allergic rhinitis 06/17/2009      Allergies:Nkda [no known drug allergy]    Current Outpatient Prescriptions   Medication Sig Dispense Refill   • estrogens, conjugated (PREMARIN) 0.3 MG Tab Take 1 tab daily 90 Tab 1   • diazePAM (VALIUM) 5 MG Tab Take 1 Tab by mouth 1 time daily as needed for Anxiety for up to 30 days. 10 Tab 0   • propranolol (INDERAL) 10 MG Tab Take 1 Tab by mouth 3 times a day. 90 Tab 1   • venlafaxine (EFFEXOR) 75 MG Tab TAKE 1 TABLET BY MOUTH TWICE DAILY WITH FOOD 180 Tab 1   • fenofibrate (TRICOR) 48 MG Tab Take 1 Tab by mouth every day. 90 Tab 1   • SitaGLIPtin-MetFORMIN HCl (JANUMET XR)  MG TABLET SR 24 HR Take 1 Tab by mouth every day. 90 Tab 1   • losartan (COZAAR) 50 MG Tab TAKE 1 TABLET BY MOUTH ONCE DAILY 90 Tab 1   • NON SPECIFIED PLEASE PROVIDE NEBULIZER MACHINE 1 Each 0   • ipratropium-albuterol (DUONEB) 0.5-2.5 (3) MG/3ML nebulizer solution 3 mL by Nebulization route 4 times a day. 30 Bullet 2   • albuterol 108 (90 Base) MCG/ACT Aero Soln inhalation aerosol Inhale 2 Puffs by mouth every 6 hours as needed for Shortness of Breath. 8.5 g 4   • ezetimibe (ZETIA) 10 MG Tab TAKE 1 TABLET BY MOUTH ONCE DAILY 90 Tab 1   • naproxen (NAPROSYN) 500 MG Tab Take 1 Tab by mouth 2 times a day, with meals. 90 Tab 0   • fluticasone (FLONASE) 50 MCG/ACT nasal spray Spray 1 Spray in nose 2 times a day. Each nostril 3 Bottle 3   • Misc. Devices Kit Glucometer (brand as accepted by insurance). To test fasting in the AM 1 Kit 0   • Lancet Device Misc MATCH TO CURRENT GLUCOMETER, To test fasting in the  Each 4   • Blood Glucose Monitoring Suppl SUPPLIES Misc Test Strips (brand as accepted by insurance)Sig: Use AM fasting. 100 Each 4     No current facility-administered medications for this visit.        Social History   Substance Use Topics   • Smoking status: Never Smoker   • Smokeless tobacco: Never Used      Comment: avoid all tobacco products   • Alcohol use  1.2 oz/week     2 Glasses of wine per week      Comment: two per day     Social History     Social History Narrative   • No narrative on file       Family History   Problem Relation Age of Onset   • Hypertension Mother    • Diabetes Mother    • Stroke Mother    • Heart Disease Mother    • Genetic Father         Parkinsons   • Hypertension Father    • Hyperlipidemia Father    • Hypertension Sister    • Diabetes Maternal Uncle    • Diabetes Paternal Aunt    • Diabetes Paternal Uncle    • Genetic Maternal Grandmother         kidney failure   • Arthritis Maternal Grandfather    • Heart Disease Maternal Grandfather    • Stroke Unknown    • Sleep Apnea Neg Hx         ROS:     - Constitutional:  Negative for fever, chills, unexpected weight change, and fatigue/generalized weakness.    - HEENT:  Negative for headaches, vision changes, hearing changes, ear pain, ear discharge, rhinorrhea, sinus congestion, sore throat, and neck pain.      - Respiratory: Negative for cough, sputum production, chest congestion, dyspnea, wheezing, and crackles.      - Cardiovascular: Negative for chest pain, palpitations, orthopnea, and bilateral lower extremity edema.     - Gastrointestinal: Negative for heartburn, nausea, vomiting, abdominal pain, hematochezia, melena, diarrhea, constipation, and greasy/foul-smelling stools.     - Genitourinary: Negative for dysuria, polyuria, hematuria, pyuria, urinary urgency, and urinary incontinence.     - Musculoskeletal: Negative for myalgias, back pain, and joint pain.     - Skin: Negative for rash, itching, cyanotic skin color change.     - Neurological: Negative for dizziness, tingling, tremors, focal sensory deficit, focal weakness and headaches.     - Endo/Heme/Allergies: Does not bruise/bleed easily.     - Psychiatric/Behavioral: Intermittent symptoms of panic attacks but no depression at this time negative for depression, suicidal/homicidal ideation and memory loss.        Last lab work in April  "2019 reviewed and discussed the patient.    Exam:    /82 (BP Location: Right arm, Patient Position: Sitting, BP Cuff Size: Large adult)   Pulse 96   Temp 36 °C (96.8 °F)   Ht 1.676 m (5' 6\")   Wt 93 kg (205 lb)   SpO2 96%  Body mass index is 33.09 kg/m².    General:  Well nourished, well developed female in NAD  Head is grossly normal.  Neck: Supple without JVD or bruit. Thyroid is not enlarged.  Pulmonary: Clear to ausculation and percussion.  Normal effort. No rales, ronchi, or wheezing.  Cardiovascular: Regular rate and rhythm without murmur. Carotid and radial pulses are intact and equal bilaterally.  Extremities: no clubbing, cyanosis, or edema.        Please note that this dictation was created using voice recognition software. I have made every reasonable attempt to correct obvious errors, but I expect that there are errors of grammar and possibly content that I did not discover before finalizing the note.    Assessment/Plan:  1. Menopause  New problem, uncontrolled.  Will start her on Premarin conjugated estrogen as opted by the patient.  All the risks and comp occasions explained to the patient.  - estrogens, conjugated (PREMARIN) 0.3 MG Tab; Take 1 tab daily  Dispense: 90 Tab; Refill: 1    2. Depression with anxiety  3. Panic attacks  Stable at this time, but on and off panic attacks as per the patient.  Patient was previously given by the providers Valium around 60 pills/month as per the  check.  Will refill for 10 tablets at this time and give referral to psychiatry for further management.  Continue current Effexor 70 mg twice daily.  - diazePAM (VALIUM) 5 MG Tab; Take 1 Tab by mouth 1 time daily as needed for Anxiety for up to 30 days.  Dispense: 10 Tab; Refill: 0  - REFERRAL TO PSYCHIATRY    4. Type 2 diabetes mellitus with complication, without long-term current use of insulin ([facility])  Stable, last check in February 2019 showing A1c at 7.0%.  Continue current Janumet, will give a future " order for HbA1c for August 8, 2019.  We will do the diabetic maintenance exams in the office today.  - POCT Retinal Eye Exam  - Diabetic Monofilament Lower Extremity Exam  - MICROALBUMIN CREAT RATIO URINE (LAB COLLECT); Future  - HEMOGLOBIN A1C; Future    5. Obesity (BMI 30-39.9)  6. BMI 33.0-33.9,adult  7. Class 1 obesity with body mass index (BMI) of 33.0 to 33.9 in adult, unspecified obesity type, unspecified whether serious comorbidity present  - Patient identified as having weight management issue.  Appropriate orders and counseling given.

## 2019-07-29 LAB — RETINAL SCREEN: NEGATIVE

## 2019-07-29 RX ORDER — LOSARTAN POTASSIUM 50 MG/1
TABLET ORAL
Qty: 90 TAB | Refills: 1 | Status: SHIPPED | OUTPATIENT
Start: 2019-07-29 | End: 2020-03-03

## 2019-08-11 DIAGNOSIS — E11.69 HYPERLIPIDEMIA ASSOCIATED WITH TYPE 2 DIABETES MELLITUS (HCC): ICD-10-CM

## 2019-08-11 DIAGNOSIS — E78.5 HYPERLIPIDEMIA ASSOCIATED WITH TYPE 2 DIABETES MELLITUS (HCC): ICD-10-CM

## 2019-08-12 NOTE — TELEPHONE ENCOUNTER
Was the patient seen in the last year in this department? Yes    Does patient have an active prescription for medications requested? No     Received Request Via: Pharmacy      Pt met protocol?: Yes pt last ov 7/2019   Lab Results   Component Value Date/Time    CHOLSTRLTOT 212 (H) 02/08/2019 0855    TRIGLYCERIDE 203 (H) 02/08/2019 0855    HDL 44 02/08/2019 0855     (H) 02/08/2019 0855     Lab Results   Component Value Date/Time    HBA1C 7.0 (H) 02/08/2019 08:55 AM      Lab Results   Component Value Date/Time    SODIUM 140 07/30/2018 07:05 AM    POTASSIUM 4.4 07/30/2018 07:05 AM    CHLORIDE 106 07/30/2018 07:05 AM    CO2 25 07/30/2018 07:05 AM    GLUCOSE 129 (H) 07/30/2018 07:05 AM    BUN 17 07/30/2018 07:05 AM    CREATININE 0.82 07/30/2018 07:05 AM

## 2019-08-13 RX ORDER — EZETIMIBE 10 MG/1
TABLET ORAL
Qty: 90 TAB | Refills: 1 | Status: SHIPPED | OUTPATIENT
Start: 2019-08-13 | End: 2019-12-06

## 2019-08-13 RX ORDER — FENOFIBRATE 48 MG/1
TABLET, COATED ORAL
Qty: 90 TAB | Refills: 1 | Status: SHIPPED | OUTPATIENT
Start: 2019-08-13 | End: 2020-03-03

## 2019-08-13 NOTE — TELEPHONE ENCOUNTER
Pt has had OV within the 12 month protocol and lipid panel is current. 6 month supply sent to pharmacy. Zetia recently added.   Lab Results   Component Value Date/Time    CHOLSTRLTOT 212 (H) 02/08/2019 08:55 AM     (H) 02/08/2019 08:55 AM    HDL 44 02/08/2019 08:55 AM    TRIGLYCERIDE 203 (H) 02/08/2019 08:55 AM       Lab Results   Component Value Date/Time    SODIUM 140 07/30/2018 07:05 AM    POTASSIUM 4.4 07/30/2018 07:05 AM    CHLORIDE 106 07/30/2018 07:05 AM    CO2 25 07/30/2018 07:05 AM    GLUCOSE 129 (H) 07/30/2018 07:05 AM    BUN 17 07/30/2018 07:05 AM    CREATININE 0.82 07/30/2018 07:05 AM     Lab Results   Component Value Date/Time    ALKPHOSPHAT 48 07/30/2018 07:05 AM    ASTSGOT 20 07/30/2018 07:05 AM    ALTSGPT 15 07/30/2018 07:05 AM    TBILIRUBIN 0.8 07/30/2018 07:05 AM

## 2019-08-22 ENCOUNTER — OFFICE VISIT (OUTPATIENT)
Dept: MEDICAL GROUP | Facility: PHYSICIAN GROUP | Age: 54
End: 2019-08-22
Payer: COMMERCIAL

## 2019-08-22 VITALS
TEMPERATURE: 98.3 F | HEART RATE: 100 BPM | HEIGHT: 66 IN | WEIGHT: 205 LBS | SYSTOLIC BLOOD PRESSURE: 122 MMHG | BODY MASS INDEX: 32.95 KG/M2 | OXYGEN SATURATION: 94 % | DIASTOLIC BLOOD PRESSURE: 82 MMHG

## 2019-08-22 DIAGNOSIS — J30.1 SEASONAL ALLERGIC RHINITIS DUE TO POLLEN: ICD-10-CM

## 2019-08-22 PROCEDURE — 99214 OFFICE O/P EST MOD 30 MIN: CPT | Performed by: PHYSICIAN ASSISTANT

## 2019-08-22 RX ORDER — TRIAMCINOLONE ACETONIDE 40 MG/ML
40 INJECTION, SUSPENSION INTRA-ARTICULAR; INTRAMUSCULAR ONCE
Status: COMPLETED | OUTPATIENT
Start: 2019-08-22 | End: 2019-08-22

## 2019-08-22 RX ORDER — TRIAMCINOLONE ACETONIDE 40 MG/ML
40 INJECTION, SUSPENSION INTRA-ARTICULAR; INTRAMUSCULAR ONCE
Qty: 1 ML | Refills: 0 | OUTPATIENT
Start: 2019-08-22 | End: 2019-08-22

## 2019-08-22 RX ORDER — MONTELUKAST SODIUM 10 MG/1
10 TABLET ORAL DAILY
Qty: 90 TAB | Refills: 1 | Status: SHIPPED | OUTPATIENT
Start: 2019-08-22 | End: 2020-02-25

## 2019-08-22 RX ADMIN — TRIAMCINOLONE ACETONIDE 40 MG: 40 INJECTION, SUSPENSION INTRA-ARTICULAR; INTRAMUSCULAR at 15:01

## 2019-08-22 NOTE — PATIENT INSTRUCTIONS
Montelukast oral tablets  What is this medicine?  MONTELUKAST (mon te LOO kast) is used to prevent and treat the symptoms of asthma. It is also used to treat allergies. Do not use for an acute asthma attack.  This medicine may be used for other purposes; ask your health care provider or pharmacist if you have questions.  COMMON BRAND NAME(S): Singulair  What should I tell my health care provider before I take this medicine?  They need to know if you have any of these conditions:  -liver disease  -an unusual or allergic reaction to montelukast, other medicines, foods, dyes, or preservatives  -pregnant or trying to get pregnant  -breast-feeding  How should I use this medicine?  This medicine should be given by mouth. Follow the directions on the prescription label. Take this medicine at the same time every day. You may take this medicine with or without meals. Do not chew the tablets. Do not stop taking your medicine unless your doctor tells you to.  Talk to your pediatrician regarding the use of this medicine in children. Special care may be needed. While this drug may be prescribed for children as young as 15 years of age for selected conditions, precautions do apply.  Overdosage: If you think you have taken too much of this medicine contact a poison control center or emergency room at once.  NOTE: This medicine is only for you. Do not share this medicine with others.  What if I miss a dose?  If you miss a dose, take it as soon as you can. If it is almost time for your next dose, take only that dose. Do not take double or extra doses.  What may interact with this medicine?  -anti-infectives like rifampin and rifabutin  -medicines for diabetes like rosiglitazone and repaglinide  -medicines for seizures like phenytoin, phenobarbital, and carbamazepine  -paclitaxel  This list may not describe all possible interactions. Give your health care provider a list of all the medicines, herbs, non-prescription drugs, or dietary  supplements you use. Also tell them if you smoke, drink alcohol, or use illegal drugs. Some items may interact with your medicine.  What should I watch for while using this medicine?  Visit your doctor or health care professional for regular checks on your progress. Tell your doctor or health care professional if your allergy or asthma symptoms do not improve. Take your medicine even when you do not have symptoms. Do not stop taking any of your medicine(s) unless your doctor tells you to.  If you have asthma, talk to your doctor about what to do in an acute asthma attack. Always have your inhaled rescue medicine for asthma attacks with you.  Patients and their families should watch for new or worsening thoughts of suicide or depression. Also watch for sudden changes in feelings such as feeling anxious, agitated, panicky, irritable, hostile, aggressive, impulsive, severely restless, overly excited and hyperactive, or not being able to sleep. Any worsening of mood or thoughts of suicide or dying should be reported to your health care professional right away.  What side effects may I notice from receiving this medicine?  Side effects that you should report to your doctor or health care professional as soon as possible:  -allergic reactions like skin rash or hives, or swelling of the face, lips, or tongue  -breathing problems  -confusion  -dark urine  -fever or infection  -flu-like symptoms  -hallucinations  -painful lumps under the skin  -pain, tingling, numbness in the hands or feet  -sinus pain or swelling  -suicidal thoughts or other mood changes  -tremors  -trouble sleeping  -uncontrolled muscle movements  -unusual bleeding or bruising  -yellowing of the eyes or skin  Side effects that usually do not require medical attention (report to your doctor or health care professional if they continue or are bothersome):  -cough  -dizziness  -drowsiness  -headache  -nightmares  -stomach upset  -stuffy nose  This list may  not describe all possible side effects. Call your doctor for medical advice about side effects. You may report side effects to FDA at 7-088-UWT-3132.  Where should I keep my medicine?  Keep out of the reach of children.  Store at room temperature between 15 and 30 degrees C (59 and 86 degrees F). Protect from light and moisture. Keep this medicine in the original bottle. Throw away any unused medicine after the expiration date.  NOTE: This sheet is a summary. It may not cover all possible information. If you have questions about this medicine, talk to your doctor, pharmacist, or health care provider.  © 2018 [employer]/Gold Standard (2016-12-19 09:40:44)

## 2019-08-22 NOTE — PROGRESS NOTES
Subjective:   Thierry Dinh is a 54 y.o. female here today for allergies. Is an established patient of Dolly Newby MD.    HPI:    Patient presents to the office today with complaints of uncontrolled allergies. States has mild allergies through spring and summer. Allergies always tend to be worse every August. Experiences itchy nose/ears/throat, runny nose/congestion, sinus pressure, sore throat, itchy/watery eyes. Symptoms are worse on her days off[place] allergic [place]. Current treatment includes daily Claritin, Flonase, Sudafed, Benadryl, and Afrin. Still not getting much relief with these things. Is not sleeping well due to congestion. Requesting Kenalog shot which she has gotten on an occasion or two in the past with good relief. Last injection in 4/2018 per review.       Current medicines (including changes today)  Current Outpatient Medications   Medication Sig Dispense Refill   • montelukast (SINGULAIR) 10 MG Tab Take 1 Tab by mouth every day. 90 Tab 1   • ezetimibe (ZETIA) 10 MG Tab TAKE 1 TABLET BY MOUTH ONCE DAILY 90 Tab 1   • fenofibrate (TRICOR) 48 MG Tab TAKE 1 TABLET BY MOUTH ONCE DAILY 90 Tab 1   • losartan (COZAAR) 50 MG Tab TAKE 1 TABLET BY MOUTH ONCE DAILY 90 Tab 1   • estrogens, conjugated (PREMARIN) 0.3 MG Tab Take 1 tab daily 90 Tab 1   • diazePAM (VALIUM) 5 MG Tab Take 1 Tab by mouth 1 time daily as needed for Anxiety for up to 30 days. 10 Tab 0   • propranolol (INDERAL) 10 MG Tab Take 1 Tab by mouth 3 times a day. 90 Tab 1   • venlafaxine (EFFEXOR) 75 MG Tab TAKE 1 TABLET BY MOUTH TWICE DAILY WITH FOOD 180 Tab 1   • SitaGLIPtin-MetFORMIN HCl (JANUMET XR)  MG TABLET SR 24 HR Take 1 Tab by mouth every day. 90 Tab 1   • NON SPECIFIED PLEASE PROVIDE NEBULIZER MACHINE 1 Each 0   • ipratropium-albuterol (DUONEB) 0.5-2.5 (3) MG/3ML nebulizer solution 3 mL by Nebulization route 4 times a day. 30 Bullet 2   • albuterol 108 (90 Base) MCG/ACT  "Aero Soln inhalation aerosol Inhale 2 Puffs by mouth every 6 hours as needed for Shortness of Breath. 8.5 g 4   • naproxen (NAPROSYN) 500 MG Tab Take 1 Tab by mouth 2 times a day, with meals. 90 Tab 0   • fluticasone (FLONASE) 50 MCG/ACT nasal spray Spray 1 Spray in nose 2 times a day. Each nostril 3 Bottle 3   • Misc. Devices Kit Glucometer (brand as accepted by insurance). To test fasting in the AM 1 Kit 0   • Lancet Device Misc MATCH TO CURRENT GLUCOMETER, To test fasting in the  Each 4   • Blood Glucose Monitoring Suppl SUPPLIES Misc Test Strips (brand as accepted by insurance)Sig: Use AM fasting. 100 Each 4     Current Facility-Administered Medications   Medication Dose Route Frequency Provider Last Rate Last Dose   • triamcinolone acetonide (KENALOG-40) injection 40 mg  40 mg Intramuscular Once SAM BhattA.KAMRAN.         She  has a past medical history of Anxiety, Chickenpox, French measles, Heart burn, Hiatus hernia syndrome, Hyperlipidemia, Hypertension, Influenza, Mumps, Pain, Psychiatric problem, Sleep apnea, and Tonsillitis.    ROS  +intermittent SOB/wheezing when allergies get bad       Objective:     /82 (BP Location: Right arm, Patient Position: Sitting, BP Cuff Size: Large adult)   Pulse 100   Temp 36.8 °C (98.3 °F)   Ht 1.676 m (5' 6\")   Wt 93 kg (205 lb)   SpO2 94%  Body mass index is 33.09 kg/m².     Physical Exam:  Constitutional: Alert, tearful, no distress.  Skin: Warm, dry, good turgor, no rashes in visible areas.  Eye: Pupils are equal and round, conjunctiva clear, lids normal.  ENMT: External ear canals are clear without erythema, edema, or drainage.  Tympanic membranes normal-appearing bilaterally.  Nasal turbinates mildly inflamed and injected with clear rhinorrhea. Lips without lesions, moist mucus membranes.  No pharyngeal erythema.  Neck: No masses. No submandibular or cervical lymphadenopathy.  Respiratory: Unlabored respiratory effort, lungs clear to " auscultation, no wheezes, no rhonchi.  Cardiovascular: Normal S1, S2, no murmur.      Assessment and Plan:   The following treatment plan was discussed    1. Seasonal allergic rhinitis due to pollen  New problem to me, uncontrolled.  Having flareup of seasonal allergies.  Symptoms unrelieved with multiple medications.  I have recommended addition of Singulair.  We also discussed that she needs to stop using the Afrin on a daily basis.  We discussed the phenomenon of rebound congestion and explained that she should only use this for a few days at a time and then not use for several weeks before using again.  Will give Kenalog injection in the office today.  We discussed adverse effects including elevation of blood sugars, blood pressure, and bone loss with repeated use.  Patient is a type II diabetic.  States her current blood sugars have been running in the 140s to 160s.  She will experience temporary elevation after getting the injection which she is aware of.  - montelukast (SINGULAIR) 10 MG Tab; Take 1 Tab by mouth every day.  Dispense: 90 Tab; Refill: 1  - triamcinolone acetonide (KENALOG-40) injection 40 mg      Followup: Return if symptoms worsen or fail to improve.    Michelle Kearns P.A.-C.

## 2019-08-23 ENCOUNTER — HOSPITAL ENCOUNTER (OUTPATIENT)
Dept: LAB | Facility: MEDICAL CENTER | Age: 54
End: 2019-08-23
Attending: INTERNAL MEDICINE
Payer: COMMERCIAL

## 2019-08-23 DIAGNOSIS — E11.42 TYPE 2 DIABETES MELLITUS WITH DIABETIC POLYNEUROPATHY, WITHOUT LONG-TERM CURRENT USE OF INSULIN (HCC): ICD-10-CM

## 2019-08-23 DIAGNOSIS — E11.8 TYPE 2 DIABETES MELLITUS WITH COMPLICATION, WITHOUT LONG-TERM CURRENT USE OF INSULIN (HCC): ICD-10-CM

## 2019-08-23 DIAGNOSIS — E78.5 HYPERLIPIDEMIA ASSOCIATED WITH TYPE 2 DIABETES MELLITUS (HCC): ICD-10-CM

## 2019-08-23 DIAGNOSIS — E11.69 HYPERLIPIDEMIA ASSOCIATED WITH TYPE 2 DIABETES MELLITUS (HCC): ICD-10-CM

## 2019-08-23 LAB
CHOLEST SERPL-MCNC: 215 MG/DL (ref 100–199)
CREAT UR-MCNC: 136.6 MG/DL
EST. AVERAGE GLUCOSE BLD GHB EST-MCNC: 148 MG/DL
FASTING STATUS PATIENT QL REPORTED: NORMAL
HBA1C MFR BLD: 6.8 % (ref 0–5.6)
HDLC SERPL-MCNC: 35 MG/DL
LDLC SERPL CALC-MCNC: 148 MG/DL
MICROALBUMIN UR-MCNC: 2.5 MG/DL
MICROALBUMIN/CREAT UR: 18 MG/G (ref 0–30)
TRIGL SERPL-MCNC: 159 MG/DL (ref 0–149)

## 2019-08-23 PROCEDURE — 82570 ASSAY OF URINE CREATININE: CPT

## 2019-08-23 PROCEDURE — 82043 UR ALBUMIN QUANTITATIVE: CPT

## 2019-08-23 PROCEDURE — 80061 LIPID PANEL: CPT

## 2019-08-23 PROCEDURE — 36415 COLL VENOUS BLD VENIPUNCTURE: CPT

## 2019-08-23 PROCEDURE — 83036 HEMOGLOBIN GLYCOSYLATED A1C: CPT

## 2019-10-01 DIAGNOSIS — R00.0 TACHYCARDIA: ICD-10-CM

## 2019-10-01 DIAGNOSIS — F41.8 DEPRESSION WITH ANXIETY: ICD-10-CM

## 2019-10-02 NOTE — TELEPHONE ENCOUNTER
Was the patient seen in the last year in this department? Yes    Does patient have an active prescription for medications requested? No     Received Request Via: Pharmacy    Pt met protocol?: Yes     Last OV 08/22/19    BP Readings from Last 1 Encounters:   08/22/19 122/82

## 2019-10-03 RX ORDER — PROPRANOLOL HYDROCHLORIDE 10 MG/1
TABLET ORAL
Qty: 90 TAB | Refills: 2 | Status: SHIPPED | OUTPATIENT
Start: 2019-10-03 | End: 2019-11-25 | Stop reason: SDUPTHER

## 2019-10-17 ENCOUNTER — OFFICE VISIT (OUTPATIENT)
Dept: URGENT CARE | Facility: PHYSICIAN GROUP | Age: 54
End: 2019-10-17
Payer: COMMERCIAL

## 2019-10-17 VITALS
OXYGEN SATURATION: 98 % | BODY MASS INDEX: 32.82 KG/M2 | RESPIRATION RATE: 12 BRPM | SYSTOLIC BLOOD PRESSURE: 148 MMHG | TEMPERATURE: 97.4 F | DIASTOLIC BLOOD PRESSURE: 88 MMHG | HEART RATE: 76 BPM | WEIGHT: 204.2 LBS | HEIGHT: 66 IN
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
## 2019-10-17 DIAGNOSIS — S16.1XXA STRAIN OF NECK MUSCLE, INITIAL ENCOUNTER: Primary | ICD-10-CM

## 2019-10-17 PROCEDURE — 99214 OFFICE O/P EST MOD 30 MIN: CPT | Performed by: PHYSICIAN ASSISTANT

## 2019-10-17 RX ORDER — CYCLOBENZAPRINE HCL 10 MG
10 TABLET ORAL 3 TIMES DAILY PRN
Qty: 15 TAB | Refills: 0 | Status: SHIPPED | OUTPATIENT
Start: 2019-10-17 | End: 2019-10-22

## 2019-10-17 ASSESSMENT — ENCOUNTER SYMPTOMS
CHILLS: 0
NAUSEA: 0
LOSS OF CONSCIOUSNESS: 0
DOUBLE VISION: 0
VOMITING: 0
WEAKNESS: 0
CONSTIPATION: 0
HEADACHES: 0
SHORTNESS OF BREATH: 0
DIZZINESS: 0
ABDOMINAL PAIN: 0
SENSORY CHANGE: 0
COUGH: 0
BLURRED VISION: 0
FOCAL WEAKNESS: 0
FEVER: 0
DIARRHEA: 0
TINGLING: 0
SPEECH CHANGE: 0

## 2019-10-17 NOTE — PATIENT INSTRUCTIONS
Neck Exercises  Neck exercises can be important for many reasons:  · They can help you to improve and maintain flexibility in your neck. This can be especially important as you age.  · They can help to make your neck stronger. This can make movement easier.  · They can reduce or prevent neck pain.  · They may help your upper back.  Ask your health care provider which neck exercises would be best for you.  Exercises  Neck Press   Repeat this exercise 10 times. Do it first thing in the morning and right before bed or as told by your health care provider.  1. Lie on your back on a firm bed or on the floor with a pillow under your head.  2. Use your neck muscles to push your head down on the pillow and straighten your spine.  3. Hold the position as well as you can. Keep your head facing up and your chin tucked.  4. Slowly count to 5 while holding this position.  5. Relax for a few seconds. Then repeat.  Isometric Strengthening   Do a full set of these exercises 2 times a day or as told by your health care provider.  1. Sit in a supportive chair and place your hand on your forehead.  2. Push forward with your head and neck while pushing back with your hand. Hold for 10 seconds.  3. Relax. Then repeat the exercise 3 times.  4. Next, do the sequence again, this time putting your hand against the back of your head. Use your head and neck to push backward against the hand pressure.  5. Finally, do the same exercise on either side of your head, pushing sideways against the pressure of your hand.  Prone Head Lifts   Repeat this exercise 5 times. Do this 2 times a day or as told by your health care provider.  1. Lie face-down, resting on your elbows so that your chest and upper back are raised.  2. Start with your head facing downward, near your chest. Position your chin either on or near your chest.  3. Slowly lift your head upward. Lift until you are looking straight ahead. Then continue lifting your head as far back as you  can stretch.  4. Hold your head up for 5 seconds. Then slowly lower it to your starting position.  Supine Head Lifts   Repeat this exercise 8-10 times. Do this 2 times a day or as told by your health care provider.  1. Lie on your back, bending your knees to point to the ceiling and keeping your feet flat on the floor.  2. Lift your head slowly off the floor, raising your chin toward your chest.  3. Hold for 5 seconds.  4. Relax and repeat.  Scapular Retraction   Repeat this exercise 5 times. Do this 2 times a day or as told by your health care provider.  1. Stand with your arms at your sides. Look straight ahead.  2. Slowly pull both shoulders backward and downward until you feel a stretch between your shoulder blades in your upper back.  3. Hold for 10-30 seconds.  4. Relax and repeat.  Contact a health care provider if:  · Your neck pain or discomfort gets much worse when you do an exercise.  · Your neck pain or discomfort does not improve within 2 hours after you exercise.  If you have any of these problems, stop exercising right away. Do not do the exercises again unless your health care provider says that you can.  Get help right away if:  · You develop sudden, severe neck pain. If this happens, stop exercising right away. Do not do the exercises again unless your health care provider says that you can.  Exercises  Neck Stretch   Repeat this exercise 3-5 times.  1. Do this exercise while standing or while sitting in a chair.  2. Place your feet flat on the floor, shoulder-width apart.  3. Slowly turn your head to the right. Turn it all the way to the right so you can look over your right shoulder. Do not tilt or tip your head.  4. Hold this position for 10-30 seconds.  5. Slowly turn your head to the left, to look over your left shoulder.  6. Hold this position for 10-30 seconds.  Neck Retraction   Repeat this exercise 8-10 times. Do this 3-4 times a day or as told by your health care provider.  1. Do this  exercise while standing or while sitting in a sturdy chair.  2. Look straight ahead. Do not bend your neck.  3. Use your fingers to push your chin backward. Do not bend your neck for this movement. Continue to face straight ahead. If you are doing the exercise properly, you will feel a slight sensation in your throat and a stretch at the back of your neck.  4. Hold the stretch for 1-2 seconds. Relax and repeat.  This information is not intended to replace advice given to you by your health care provider. Make sure you discuss any questions you have with your health care provider.  Document Released: 11/28/2016 Document Revised: 05/25/2017 Document Reviewed: 06/27/2016  [employer] Interactive Patient Education © 2017 [employer]        Back Exercises  Back exercises help treat and prevent back injuries. The goal is to increase your strength in your belly (abdominal) and back muscles. These exercises can also help with flexibility. Start these exercises when told by your doctor.  HOME CARE  Back exercises include:  Pelvic Tilt.  · Lie on your back with your knees bent. Tilt your pelvis until the lower part of your back is against the floor. Hold this position 5 to 10 sec. Repeat this exercise 5 to 10 times.  Knee to Chest.  · Pull 1 knee up against your chest and hold for 20 to 30 seconds. Repeat this with the other knee. This may be done with the other leg straight or bent, whichever feels better. Then, pull both knees up against your chest.  Sit-Ups or Curl-Ups.  · Bend your knees 90 degrees. Start with tilting your pelvis, and do a partial, slow sit-up. Only lift your upper half 30 to 45 degrees off the floor. Take at least 2 to 3 seonds for each sit-up. Do not do sit-ups with your knees out straight. If partial sit-ups are difficult, simply do the above but with only tightening your belly (abdominal) muscles and holding it as told.  Hip-Lift.  · Lie on your back with your knees flexed 90 degrees. Push down with  your feet and shoulders as you raise your hips 2 inches off the floor. Hold for 10 seconds, repeat 5 to 10 times.  Back Arches.  · Lie on your stomach. Prop yourself up on bent elbows. Slowly press on your hands, causing an arch in your low back. Repeat 3 to 5 times.  Shoulder-Lifts.  · Lie face down with arms beside your body. Keep hips and belly pressed to floor as you slowly lift your head and shoulders off the floor.  Do not overdo your exercises. Be careful in the beginning. Exercises may cause you some mild back discomfort. If the pain lasts for more than 15 minutes, stop the exercises until you see your doctor. Improvement with exercise for back problems is slow.      This information is not intended to replace advice given to you by your health care provider. Make sure you discuss any questions you have with your health care provider.     Document Released: 01/20/2012 Document Revised: 03/11/2013 Document Reviewed: 02/11/2016  [employer] Interactive Patient Education ©2016 [employer]
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
## 2019-11-25 ENCOUNTER — TELEPHONE (OUTPATIENT)
Dept: MEDICAL GROUP | Facility: PHYSICIAN GROUP | Age: 54
End: 2019-11-25

## 2019-11-25 DIAGNOSIS — F41.8 DEPRESSION WITH ANXIETY: ICD-10-CM

## 2019-11-25 DIAGNOSIS — R00.0 TACHYCARDIA: ICD-10-CM

## 2019-11-25 RX ORDER — PROPRANOLOL HYDROCHLORIDE 10 MG/1
10 TABLET ORAL 4 TIMES DAILY
Qty: 120 TAB | Refills: 1 | Status: SHIPPED | OUTPATIENT
Start: 2019-11-25 | End: 2019-12-05

## 2019-11-25 NOTE — TELEPHONE ENCOUNTER
----- Message from Thierry Dinh sent at 11/23/2019  8:50 AM PST -----  Regarding: Prescription Question  Contact: [phone]  Please refill my propranolol prescription. I take 4 10mg tabs a day. They keep refilling the 90 ct which is only 22.5 days. I have contacted on a few occasions to correct this. My next appt is dec 2nd but I am out now. Thanks.

## 2019-11-26 NOTE — TELEPHONE ENCOUNTER
Phone Number Called: [phone] (home)     Call outcome: left message for patient to call back regarding message below

## 2019-11-26 NOTE — TELEPHONE ENCOUNTER
I saw the patient last in 07/2019 and I am not sure when is increased the dose of propranolol 4x/day instead of 3x/day. I will discuss more on her upcoming visit . Also she will need to keep up her appointment with Behavioral health services on the referral placed last time ----    [facility] [address], [address]  [place] [id number]  [phone]     Thank you,  Dolly Newby M.D.

## 2019-11-27 NOTE — TELEPHONE ENCOUNTER
Phone Number Called: [phone] (home)       Call outcome: left message for patient to call back regarding message below

## 2019-12-05 ENCOUNTER — OFFICE VISIT (OUTPATIENT)
Dept: MEDICAL GROUP | Facility: PHYSICIAN GROUP | Age: 54
End: 2019-12-05
Payer: COMMERCIAL

## 2019-12-05 VITALS
SYSTOLIC BLOOD PRESSURE: 118 MMHG | OXYGEN SATURATION: 94 % | TEMPERATURE: 97.6 F | BODY MASS INDEX: 35 KG/M2 | WEIGHT: 205 LBS | HEART RATE: 72 BPM | DIASTOLIC BLOOD PRESSURE: 72 MMHG | HEIGHT: 64 IN

## 2019-12-05 DIAGNOSIS — E11.69 HYPERLIPIDEMIA ASSOCIATED WITH TYPE 2 DIABETES MELLITUS (HCC): ICD-10-CM

## 2019-12-05 DIAGNOSIS — E78.5 HYPERLIPIDEMIA ASSOCIATED WITH TYPE 2 DIABETES MELLITUS (HCC): ICD-10-CM

## 2019-12-05 DIAGNOSIS — E11.42 TYPE 2 DIABETES MELLITUS WITH DIABETIC POLYNEUROPATHY, WITHOUT LONG-TERM CURRENT USE OF INSULIN (HCC): ICD-10-CM

## 2019-12-05 DIAGNOSIS — R00.0 TACHYCARDIA: ICD-10-CM

## 2019-12-05 DIAGNOSIS — F33.40 RECURRENT MAJOR DEPRESSIVE DISORDER, IN REMISSION (HCC): ICD-10-CM

## 2019-12-05 DIAGNOSIS — Z12.31 ENCOUNTER FOR SCREENING MAMMOGRAM FOR BREAST CANCER: ICD-10-CM

## 2019-12-05 DIAGNOSIS — F41.0 PANIC ATTACKS: ICD-10-CM

## 2019-12-05 DIAGNOSIS — E55.9 VITAMIN D DEFICIENCY: ICD-10-CM

## 2019-12-05 DIAGNOSIS — Z98.84 HISTORY OF ADJUSTABLE GASTRIC BANDING: ICD-10-CM

## 2019-12-05 LAB
HBA1C MFR BLD: 6.4 % (ref 0–5.6)
INT CON NEG: NEGATIVE
INT CON POS: POSITIVE

## 2019-12-05 PROCEDURE — 83036 HEMOGLOBIN GLYCOSYLATED A1C: CPT | Performed by: INTERNAL MEDICINE

## 2019-12-05 PROCEDURE — 99214 OFFICE O/P EST MOD 30 MIN: CPT | Performed by: INTERNAL MEDICINE

## 2019-12-05 RX ORDER — PROPRANOLOL HYDROCHLORIDE 20 MG/1
20 TABLET ORAL 2 TIMES DAILY
Qty: 180 TAB | Refills: 1 | Status: SHIPPED | OUTPATIENT
Start: 2019-12-05 | End: 2020-08-13

## 2019-12-05 SDOH — HEALTH STABILITY: MENTAL HEALTH: HOW OFTEN DO YOU HAVE A DRINK CONTAINING ALCOHOL?: 2-3 TIMES A WEEK

## 2019-12-05 SDOH — HEALTH STABILITY: MENTAL HEALTH: HOW OFTEN DO YOU HAVE 6 OR MORE DRINKS ON ONE OCCASION?: NEVER

## 2019-12-05 SDOH — HEALTH STABILITY: MENTAL HEALTH: HOW MANY STANDARD DRINKS CONTAINING ALCOHOL DO YOU HAVE ON A TYPICAL DAY?: 1 OR 2

## 2019-12-05 NOTE — ASSESSMENT & PLAN NOTE
This is a chronic health problem that is uncontrolled with current medications and lifestyle measures. Last A1C in 08/2019 was at 6.8%. Currently on Janumet 50/1000 once a day,

## 2019-12-05 NOTE — PATIENT INSTRUCTIONS
Constipation, Adult  Constipation is when a person:  · Poops (has a bowel movement) fewer times in a week than normal.  · Has a hard time pooping.  · Has poop that is dry, hard, or bigger than normal.  Follow these instructions at home:  Eating and drinking  · Eat foods that have a lot of fiber, such as:  ¨ Fresh fruits and vegetables.  ¨ Whole grains.  ¨ Beans.  · Eat less of foods that are high in fat, low in fiber, or overly processed, such as:  ¨ French fries.  ¨ Hamburgers.  ¨ Cookies.  ¨ Candy.  ¨ Soda.  · Drink enough fluid to keep your pee (urine) clear or pale yellow.  General instructions  · Exercise regularly or as told by your doctor.  · Go to the restroom when you feel like you need to poop. Do not hold it in.  · Take over-the-counter and prescription medicines only as told by your doctor. These include any fiber supplements.  · Do pelvic floor retraining exercises, such as:  ¨ Doing deep breathing while relaxing your lower belly (abdomen).  ¨ Relaxing your pelvic floor while pooping.  · Watch your condition for any changes.  · Keep all follow-up visits as told by your doctor. This is important.  Contact a doctor if:  · You have pain that gets worse.  · You have a fever.  · You have not pooped for 4 days.  · You throw up (vomit).  · You are not hungry.  · You lose weight.  · You are bleeding from the anus.  · You have thin, pencil-like poop (stool).  Get help right away if:  · You have a fever, and your symptoms suddenly get worse.  · You leak poop or have blood in your poop.  · Your belly feels hard or bigger than normal (is bloated).  · You have very bad belly pain.  · You feel dizzy or you faint.  This information is not intended to replace advice given to you by your health care provider. Make sure you discuss any questions you have with your health care provider.  Document Released: 06/05/2009 Document Revised: 07/07/2017 Document Reviewed: 06/07/2017  [employer] Interactive Patient Education © 2017  [employer]

## 2019-12-05 NOTE — ASSESSMENT & PLAN NOTE
This is a chronic health problem that is well controlled with current medications and lifestyle measures.  Patient is currently on propranolol 20 mg twice daily, she has not been receiving 90-day supplies of the medications given by us.  Possible insurance issues.  Will resend again as requested with the patient.

## 2019-12-05 NOTE — ASSESSMENT & PLAN NOTE
This is a chronic health problem that is uncontrolled with current medications and lifestyle measures.  Last vitamin D check in 2013.  Not on any supplements at this time.  Requesting all the baseline labs to be done at this time.

## 2019-12-05 NOTE — PROGRESS NOTES
CC: Follow-up visit, diabetes mellitus.    HISTORY OF PRESENT ILLNESS: Patient is a 54 y.o. female established patient who presents today to discuss her medical conditions as mentioned in HPI below.    Health Maintenance: Due for mammogram, diabetic maintenance exams will be done in the office today.    Type 2 diabetes mellitus with diabetic polyneuropathy, without long-term current use of insulin (CMS-HCC) (HCC)  This is a chronic health problem that is uncontrolled with current medications and lifestyle measures. Last A1C in 08/2019 was at 6.8%. Currently on Janumet 50/1000 once a day,    Panic attacks  This is a chronic health problem that is well controlled with current medications and lifestyle measures.  Patient is currently on propranolol 20 mg twice daily, she has not been receiving 90-day supplies of the medications given by us.  Possible insurance issues.  Will resend again as requested with the patient.    History of adjustable gastric banding  This is a chronic health problem that is well controlled with current medications and lifestyle measures.  Patient had previous history of adjustable gastric banding with [facility].  Requesting for a referral to be placed so that patient can get it rechecked at this time.  Does have ongoing constipation recently.  For which I have advised her to take dietary regulations as mentioned in the after visit summary, also to take over-the-counter MiraLAX as needed.    Vitamin D deficiency  This is a chronic health problem that is uncontrolled with current medications and lifestyle measures.  Last vitamin D check in 2013.  Not on any supplements at this time.  Requesting all the baseline labs to be done at this time.      PHQ score 0, BMI > 35 , no tobacco, no fall injuries.    Patient Active Problem List    Diagnosis Date Noted   • Vitamin D deficiency 12/05/2019   • History of adjustable gastric banding 12/05/2019   • Obesity (BMI 30-39.9)  07/26/2019   • Menopause 07/26/2019   • Panic attacks 07/26/2019   • Exercise-induced asthma 01/03/2019   • Mixed stress and urge urinary incontinence 05/31/2018   • Tachycardia 05/31/2018   • Healthcare maintenance 04/13/2018   • Left hip pain 02/16/2018   • Type 2 diabetes mellitus with diabetic polyneuropathy, without long-term current use of insulin ([facility]) 10/27/2016   • BMI 33.0-33.9,adult 10/27/2016   • Obstructive sleep apnea 10/12/2016   • Insomnia 10/12/2016   • Essential hypertension 01/17/2012   • Hyperlipidemia associated with type 2 diabetes mellitus ([facility]) 06/02/2010   • Recurrent major depressive disorder, in remission ([facility]) 06/17/2009   • Allergic rhinitis 06/17/2009      Allergies:Nkda [no known drug allergy]    Current Outpatient Medications   Medication Sig Dispense Refill   • propranolol (INDERAL) 20 MG Tab Take 1 Tab by mouth 2 times a day. 180 Tab 1   • montelukast (SINGULAIR) 10 MG Tab Take 1 Tab by mouth every day. 90 Tab 1   • ezetimibe (ZETIA) 10 MG Tab TAKE 1 TABLET BY MOUTH ONCE DAILY 90 Tab 1   • fenofibrate (TRICOR) 48 MG Tab TAKE 1 TABLET BY MOUTH ONCE DAILY 90 Tab 1   • losartan (COZAAR) 50 MG Tab TAKE 1 TABLET BY MOUTH ONCE DAILY 90 Tab 1   • estrogens, conjugated (PREMARIN) 0.3 MG Tab Take 1 tab daily 90 Tab 1   • venlafaxine (EFFEXOR) 75 MG Tab TAKE 1 TABLET BY MOUTH TWICE DAILY WITH FOOD 180 Tab 1   • SitaGLIPtin-MetFORMIN HCl (JANUMET XR)  MG TABLET SR 24 HR Take 1 Tab by mouth every day. 90 Tab 1   • ipratropium-albuterol (DUONEB) 0.5-2.5 (3) MG/3ML nebulizer solution 3 mL by Nebulization route 4 times a day. 30 Bullet 2   • albuterol 108 (90 Base) MCG/ACT Aero Soln inhalation aerosol Inhale 2 Puffs by mouth every 6 hours as needed for Shortness of Breath. 8.5 g 4   • naproxen (NAPROSYN) 500 MG Tab Take 1 Tab by mouth 2 times a day, with meals. 90 Tab 0   • Misc. Devices Kit Glucometer (brand as accepted by insurance). To test fasting in the AM 1 Kit 0   • Lancet Device  Misc MATCH TO CURRENT GLUCOMETER, To test fasting in the  Each 4   • Blood Glucose Monitoring Suppl SUPPLIES Misc Test Strips (brand as accepted by insurance)Sig: Use AM fasting. 100 Each 4     No current facility-administered medications for this visit.        Social History     Tobacco Use   • Smoking status: Never Smoker   • Smokeless tobacco: Never Used   • Tobacco comment: avoid all tobacco products   Substance Use Topics   • Alcohol use: Yes     Alcohol/week: 1.2 oz     Types: 2 Shots of liquor per week     Frequency: 2-3 times a week     Drinks per session: 1 or 2     Binge frequency: Never   • Drug use: No     Social History     Patient does not qualify to have social determinant information on file (likely too young).   Social History Narrative   • Not on file       Family History   Problem Relation Age of Onset   • Hypertension Mother    • Diabetes Mother    • Stroke Mother    • Heart Disease Mother    • Genetic Disorder Father         Parkinsons   • Hypertension Father    • Hyperlipidemia Father    • Hypertension Sister    • Diabetes Maternal Uncle    • Diabetes Paternal Aunt    • Diabetes Paternal Uncle    • Genetic Disorder Maternal Grandmother         kidney failure   • Arthritis Maternal Grandfather    • Heart Disease Maternal Grandfather    • Stroke Other    • Sleep Apnea Neg Hx         ROS:     - Constitutional:  Negative for fever, chills, unexpected weight change, and fatigue/generalized weakness.    - HEENT:  Negative for headaches, vision changes, hearing changes, ear pain, ear discharge, rhinorrhea, sinus congestion, sore throat, and neck pain.      - Respiratory: Negative for cough, sputum production, chest congestion, dyspnea, wheezing, and crackles.      - Cardiovascular: Negative for chest pain, palpitations, orthopnea, and bilateral lower extremity edema.     - Gastrointestinal: Positive for constipation, will be trying over-the-counter MiraLAX as advised along with a dietary  "regulation.  Negative for heartburn, nausea, vomiting, abdominal pain, hematochezia, melena, diarrhea and greasy/foul-smelling stools.     - Genitourinary: Negative for dysuria, polyuria, hematuria, pyuria, urinary urgency, and urinary incontinence.     - Musculoskeletal: Negative for myalgias, back pain, and joint pain.     - Skin: Negative for rash, itching, cyanotic skin color change.     - Neurological: Negative for dizziness, tingling, tremors, focal sensory deficit, focal weakness and headaches.     - Endo/Heme/Allergies: Does not bruise/bleed easily.     - Psychiatric/Behavioral: Negative for depression, suicidal/homicidal ideation and memory loss.      Last lab work in August 2019 reviewed and discussed the patient.      Exam:    /72 (BP Location: Right arm, Patient Position: Sitting, BP Cuff Size: Adult)   Pulse 72   Temp 36.4 °C (97.6 °F) (Temporal)   Ht 1.626 m (5' 4\")   Wt 93 kg (205 lb)   SpO2 94%  Body mass index is 35.19 kg/m².    General:  Well nourished, well developed female in NAD  Head is grossly normal.  Neck: Supple without JVD or bruit. Thyroid is not enlarged.  Pulmonary: Clear to ausculation and percussion.  Normal effort. No rales, ronchi, or wheezing.  Cardiovascular: Regular rate and rhythm without murmur. Carotid and radial pulses are intact and equal bilaterally.  Extremities: no clubbing, cyanosis, or edema.    Please note that this dictation was created using voice recognition software. I have made every reasonable attempt to correct obvious errors, but I expect that there are errors of grammar and possibly content that I did not discover before finalizing the note.    Assessment/Plan:  1. Tachycardia  2. Panic attacks  Well-controlled, continue current propranolol 20 mg twice daily.  Have resent the prescription again as patient did not get the 90-day supplies which was done by us due to insurance issues.  - propranolol (INDERAL) 20 MG Tab; Take 1 Tab by mouth 2 times a " day.  Dispense: 180 Tab; Refill: 1    3. Type 2 diabetes mellitus with diabetic polyneuropathy, without long-term current use of insulin (HCC)  Uncontrolled, last A1c in August 2019 at 6.8%.  Continue current Janumet.  We will recheck her A1c and adjust the dose if needed.  - POCT A1C  - Comp Metabolic Panel; Future    4. Encounter for screening mammogram for breast cancer  - MA-SCREENING MAMMO BILAT W/TOMOSYNTHESIS W/CAD; Future    5. Vitamin D deficiency  Uncontrolled, last check in 2 than 13.  Will recheck at this time and replete if needed.  Continue the current over-the-counter vitamin D 2000 units daily.  - VITAMIN D,25 HYDROXY; Future    6. Recurrent major depressive disorder, in remission (HCC)  Well-controlled, continue current venlafaxine.  Will check a thyroid function and treat if abnormal.  - TSH WITH REFLEX TO FT4; Future    7. Hyperlipidemia associated with type 2 diabetes mellitus (HCC)  - Lipid Profile; Future    8. History of adjustable gastric banding  - REFERRAL TO GENERAL SURGERY

## 2019-12-06 ENCOUNTER — TELEPHONE (OUTPATIENT)
Dept: MEDICAL GROUP | Facility: PHYSICIAN GROUP | Age: 54
End: 2019-12-06

## 2019-12-06 ENCOUNTER — HOSPITAL ENCOUNTER (OUTPATIENT)
Dept: LAB | Facility: MEDICAL CENTER | Age: 54
End: 2019-12-06
Attending: INTERNAL MEDICINE
Payer: COMMERCIAL

## 2019-12-06 DIAGNOSIS — E11.42 TYPE 2 DIABETES MELLITUS WITH DIABETIC POLYNEUROPATHY, WITHOUT LONG-TERM CURRENT USE OF INSULIN (HCC): ICD-10-CM

## 2019-12-06 DIAGNOSIS — E11.69 HYPERLIPIDEMIA ASSOCIATED WITH TYPE 2 DIABETES MELLITUS (HCC): ICD-10-CM

## 2019-12-06 DIAGNOSIS — F33.40 RECURRENT MAJOR DEPRESSIVE DISORDER, IN REMISSION (HCC): ICD-10-CM

## 2019-12-06 DIAGNOSIS — E78.5 HYPERLIPIDEMIA ASSOCIATED WITH TYPE 2 DIABETES MELLITUS (HCC): ICD-10-CM

## 2019-12-06 DIAGNOSIS — E55.9 VITAMIN D DEFICIENCY: ICD-10-CM

## 2019-12-06 LAB
25(OH)D3 SERPL-MCNC: 17 NG/ML (ref 30–100)
ALBUMIN SERPL BCP-MCNC: 4.2 G/DL (ref 3.2–4.9)
ALBUMIN/GLOB SERPL: 1.6 G/DL
ALP SERPL-CCNC: 41 U/L (ref 30–99)
ALT SERPL-CCNC: 17 U/L (ref 2–50)
ANION GAP SERPL CALC-SCNC: 9 MMOL/L (ref 0–11.9)
AST SERPL-CCNC: 15 U/L (ref 12–45)
BILIRUB SERPL-MCNC: 0.6 MG/DL (ref 0.1–1.5)
BUN SERPL-MCNC: 20 MG/DL (ref 8–22)
CALCIUM SERPL-MCNC: 9 MG/DL (ref 8.5–10.5)
CHLORIDE SERPL-SCNC: 106 MMOL/L (ref 96–112)
CHOLEST SERPL-MCNC: 196 MG/DL (ref 100–199)
CO2 SERPL-SCNC: 24 MMOL/L (ref 20–33)
CREAT SERPL-MCNC: 0.75 MG/DL (ref 0.5–1.4)
FASTING STATUS PATIENT QL REPORTED: NORMAL
GLOBULIN SER CALC-MCNC: 2.6 G/DL (ref 1.9–3.5)
GLUCOSE SERPL-MCNC: 139 MG/DL (ref 65–99)
HDLC SERPL-MCNC: 39 MG/DL
LDLC SERPL CALC-MCNC: 113 MG/DL
POTASSIUM SERPL-SCNC: 4.4 MMOL/L (ref 3.6–5.5)
PROT SERPL-MCNC: 6.8 G/DL (ref 6–8.2)
SODIUM SERPL-SCNC: 139 MMOL/L (ref 135–145)
TRIGL SERPL-MCNC: 221 MG/DL (ref 0–149)
TSH SERPL DL<=0.005 MIU/L-ACNC: 1.17 UIU/ML (ref 0.38–5.33)

## 2019-12-06 PROCEDURE — 82306 VITAMIN D 25 HYDROXY: CPT

## 2019-12-06 PROCEDURE — 36415 COLL VENOUS BLD VENIPUNCTURE: CPT

## 2019-12-06 PROCEDURE — 80061 LIPID PANEL: CPT

## 2019-12-06 PROCEDURE — 80053 COMPREHEN METABOLIC PANEL: CPT

## 2019-12-06 PROCEDURE — 84443 ASSAY THYROID STIM HORMONE: CPT

## 2019-12-06 RX ORDER — ERGOCALCIFEROL 1.25 MG/1
50000 CAPSULE ORAL
Qty: 12 CAP | Refills: 0 | Status: SHIPPED | OUTPATIENT
Start: 2019-12-06 | End: 2020-08-13

## 2019-12-06 RX ORDER — EZETIMIBE AND SIMVASTATIN 10; 10 MG/1; MG/1
1 TABLET ORAL DAILY
Qty: 90 TAB | Refills: 1 | Status: SHIPPED | OUTPATIENT
Start: 2019-12-06 | End: 2020-08-04

## 2019-12-06 NOTE — TELEPHONE ENCOUNTER
----- Message from Dolly Newby M.D. sent at 12/5/2019 11:01 PM PST -----  Your A1c is remaining at goal, continue the current medications.  Dolly Newby M.D.

## 2019-12-17 NOTE — TELEPHONE ENCOUNTER
Patient has recently been seen by PCP within the last 6 months per protocol (12/19). Will refill medications for 6 months.  Lab Results   Component Value Date/Time    HBA1C 6.4 (A) 12/05/2019 01:56 PM      Lab Results   Component Value Date/Time    MALBCRT 18 08/23/2019 08:27 AM    MICROALBUR 2.5 08/23/2019 08:27 AM      Lab Results   Component Value Date/Time    ALKPHOSPHAT 41 12/06/2019 08:32 AM    ASTSGOT 15 12/06/2019 08:32 AM    ALTSGPT 17 12/06/2019 08:32 AM    TBILIRUBIN 0.6 12/06/2019 08:32 AM

## 2020-01-02 ENCOUNTER — PATIENT MESSAGE (OUTPATIENT)
Dept: HEALTH INFORMATION MANAGEMENT | Facility: OTHER | Age: 55
End: 2020-01-02

## 2020-01-19 DIAGNOSIS — Z78.0 MENOPAUSE: ICD-10-CM

## 2020-01-21 RX ORDER — VENLAFAXINE 75 MG/1
TABLET ORAL
Qty: 180 TAB | Refills: 1 | Status: SHIPPED | OUTPATIENT
Start: 2020-01-21 | End: 2020-07-25

## 2020-02-07 ENCOUNTER — HOSPITAL ENCOUNTER (OUTPATIENT)
Dept: RADIOLOGY | Facility: MEDICAL CENTER | Age: 55
End: 2020-02-07
Attending: CLINICAL NURSE SPECIALIST
Payer: COMMERCIAL

## 2020-02-07 DIAGNOSIS — K95.09 GASTRIC BAND MALFUNCTION: ICD-10-CM

## 2020-02-07 DIAGNOSIS — A08.11 EPIDEMIC VOMITING SYNDROME: ICD-10-CM

## 2020-02-07 PROCEDURE — 74240 X-RAY XM UPR GI TRC 1CNTRST: CPT

## 2020-02-23 DIAGNOSIS — J30.1 SEASONAL ALLERGIC RHINITIS DUE TO POLLEN: ICD-10-CM

## 2020-02-25 RX ORDER — MONTELUKAST SODIUM 10 MG/1
TABLET ORAL
Qty: 90 TAB | Refills: 1 | Status: SHIPPED | OUTPATIENT
Start: 2020-02-25 | End: 2020-09-01

## 2020-03-02 DIAGNOSIS — I10 ESSENTIAL HYPERTENSION: ICD-10-CM

## 2020-03-02 NOTE — TELEPHONE ENCOUNTER
Was the patient seen in the last year in this department? Yes    Does patient have an active prescription for medications requested? No     Received Request Via: Pharmacy      Pt met protocol?: Yes   Pt last ov 12/5/2019   Lab Results   Component Value Date/Time    CHOLSTRLTOT 196 12/06/2019 0832    TRIGLYCERIDE 221 (H) 12/06/2019 0832    HDL 39 (A) 12/06/2019 0832     (H) 12/06/2019 0832     Lab Results   Component Value Date/Time    HBA1C 6.4 (A) 12/05/2019 01:56 PM      Lab Results   Component Value Date/Time    SODIUM 139 12/06/2019 08:32 AM    POTASSIUM 4.4 12/06/2019 08:32 AM    CHLORIDE 106 12/06/2019 08:32 AM    CO2 24 12/06/2019 08:32 AM    GLUCOSE 139 (H) 12/06/2019 08:32 AM    BUN 20 12/06/2019 08:32 AM    CREATININE 0.75 12/06/2019 08:32 AM

## 2020-03-02 NOTE — TELEPHONE ENCOUNTER
Was the patient seen in the last year in this department? Yes    Does patient have an active prescription for medications requested? No     Received Request Via: Pharmacy      Pt met protocol?: Yes   Pt last ov 12/2019   BP Readings from Last 1 Encounters:   12/05/19 118/72

## 2020-03-03 RX ORDER — LOSARTAN POTASSIUM 50 MG/1
TABLET ORAL
Qty: 90 TAB | Refills: 1 | Status: SHIPPED | OUTPATIENT
Start: 2020-03-03 | End: 2020-09-18

## 2020-03-03 RX ORDER — FENOFIBRATE 48 MG/1
TABLET, COATED ORAL
Qty: 90 TAB | Refills: 1 | Status: SHIPPED | OUTPATIENT
Start: 2020-03-03 | End: 2021-03-01

## 2020-03-03 NOTE — TELEPHONE ENCOUNTER
Refill X 6 months, sent to pharmacy.Pt. Seen in the last 6 months per protocol.   Lab Results   Component Value Date/Time    SODIUM 139 12/06/2019 08:32 AM    POTASSIUM 4.4 12/06/2019 08:32 AM    CHLORIDE 106 12/06/2019 08:32 AM    CO2 24 12/06/2019 08:32 AM    GLUCOSE 139 (H) 12/06/2019 08:32 AM    BUN 20 12/06/2019 08:32 AM    CREATININE 0.75 12/06/2019 08:32 AM

## 2020-03-03 NOTE — TELEPHONE ENCOUNTER
Pt has had OV within the 12 month protocol and lipid panel is current. 6 month supply sent to pharmacy. Pt recently adjusted on meds.   Lab Results   Component Value Date/Time    CHOLSTRLTOT 196 12/06/2019 08:32 AM     (H) 12/06/2019 08:32 AM    HDL 39 (A) 12/06/2019 08:32 AM    TRIGLYCERIDE 221 (H) 12/06/2019 08:32 AM       Lab Results   Component Value Date/Time    SODIUM 139 12/06/2019 08:32 AM    POTASSIUM 4.4 12/06/2019 08:32 AM    CHLORIDE 106 12/06/2019 08:32 AM    CO2 24 12/06/2019 08:32 AM    GLUCOSE 139 (H) 12/06/2019 08:32 AM    BUN 20 12/06/2019 08:32 AM    CREATININE 0.75 12/06/2019 08:32 AM     Lab Results   Component Value Date/Time    ALKPHOSPHAT 41 12/06/2019 08:32 AM    ASTSGOT 15 12/06/2019 08:32 AM    ALTSGPT 17 12/06/2019 08:32 AM    TBILIRUBIN 0.6 12/06/2019 08:32 AM

## 2020-05-10 ENCOUNTER — PATIENT MESSAGE (OUTPATIENT)
Dept: MEDICAL GROUP | Facility: PHYSICIAN GROUP | Age: 55
End: 2020-05-10

## 2020-06-15 DIAGNOSIS — E78.5 HYPERLIPIDEMIA ASSOCIATED WITH TYPE 2 DIABETES MELLITUS (HCC): ICD-10-CM

## 2020-06-15 DIAGNOSIS — E11.69 HYPERLIPIDEMIA ASSOCIATED WITH TYPE 2 DIABETES MELLITUS (HCC): ICD-10-CM

## 2020-06-18 RX ORDER — SITAGLIPTIN AND METFORMIN HYDROCHLORIDE 1000; 50 MG/1; MG/1
TABLET, FILM COATED, EXTENDED RELEASE ORAL
Qty: 90 TAB | Refills: 0 | Status: SHIPPED | OUTPATIENT
Start: 2020-06-18 | End: 2020-09-09

## 2020-06-18 RX ORDER — EZETIMIBE 10 MG/1
TABLET ORAL
Qty: 90 TAB | Refills: 0 | OUTPATIENT
Start: 2020-06-18

## 2020-06-18 NOTE — TELEPHONE ENCOUNTER
Patient needs new PCP.Last seen by PCP 12/5/2019. Ezetimibe replaced with combo.    Lab Results   Component Value Date/Time    HBA1C 6.4 (A) 12/05/2019 01:56 PM      Lab Results   Component Value Date/Time    MALBCRT 18 08/23/2019 08:27 AM    MICROALBUR 2.5 08/23/2019 08:27 AM      Lab Results   Component Value Date/Time    ALKPHOSPHAT 41 12/06/2019 08:32 AM    ASTSGOT 15 12/06/2019 08:32 AM    ALTSGPT 17 12/06/2019 08:32 AM    TBILIRUBIN 0.6 12/06/2019 08:32 AM        Will send 3 month(s) to the pharmacy.  Pt to make appt prior to more refills.

## 2020-07-21 DIAGNOSIS — E78.5 HYPERLIPIDEMIA ASSOCIATED WITH TYPE 2 DIABETES MELLITUS (HCC): ICD-10-CM

## 2020-07-21 DIAGNOSIS — E11.69 HYPERLIPIDEMIA ASSOCIATED WITH TYPE 2 DIABETES MELLITUS (HCC): ICD-10-CM

## 2020-07-24 NOTE — TELEPHONE ENCOUNTER
You have upcoming appt with pt and have never prescribed for this pt before, please refill as you see fit.

## 2020-07-25 RX ORDER — EZETIMIBE 10 MG/1
TABLET ORAL
Qty: 90 TAB | Refills: 0 | OUTPATIENT
Start: 2020-07-25

## 2020-07-25 RX ORDER — VENLAFAXINE 75 MG/1
TABLET ORAL
Qty: 180 TAB | Refills: 0 | Status: SHIPPED | OUTPATIENT
Start: 2020-07-25 | End: 2020-10-20

## 2020-07-25 NOTE — TELEPHONE ENCOUNTER
Please inform patient that I have refilled her Effexor. I refused the ezetimibe because she is now on Vytorin which is a combination cholesterol medication that has ezetimibe in it.  Michelle Kearns P.A.-C.

## 2020-08-04 RX ORDER — EZETIMIBE AND SIMVASTATIN 10; 10 MG/1; MG/1
TABLET ORAL
Qty: 90 TAB | Refills: 0 | Status: SHIPPED | OUTPATIENT
Start: 2020-08-04 | End: 2020-09-10

## 2020-08-13 ENCOUNTER — OFFICE VISIT (OUTPATIENT)
Dept: MEDICAL GROUP | Facility: PHYSICIAN GROUP | Age: 55
End: 2020-08-13
Payer: COMMERCIAL

## 2020-08-13 VITALS
HEIGHT: 64 IN | TEMPERATURE: 98.2 F | SYSTOLIC BLOOD PRESSURE: 122 MMHG | HEART RATE: 94 BPM | OXYGEN SATURATION: 98 % | BODY MASS INDEX: 31.72 KG/M2 | WEIGHT: 185.8 LBS | DIASTOLIC BLOOD PRESSURE: 80 MMHG

## 2020-08-13 DIAGNOSIS — G47.33 OBSTRUCTIVE SLEEP APNEA: ICD-10-CM

## 2020-08-13 DIAGNOSIS — I10 ESSENTIAL HYPERTENSION: ICD-10-CM

## 2020-08-13 DIAGNOSIS — R20.9 BILATERAL COLD FEET: ICD-10-CM

## 2020-08-13 DIAGNOSIS — E11.42 TYPE 2 DIABETES MELLITUS WITH DIABETIC POLYNEUROPATHY, WITHOUT LONG-TERM CURRENT USE OF INSULIN (HCC): ICD-10-CM

## 2020-08-13 DIAGNOSIS — E11.42 DIABETIC PERIPHERAL NEUROPATHY ASSOCIATED WITH TYPE 2 DIABETES MELLITUS (HCC): ICD-10-CM

## 2020-08-13 PROBLEM — Z00.00 HEALTHCARE MAINTENANCE: Status: RESOLVED | Noted: 2018-04-13 | Resolved: 2020-08-13

## 2020-08-13 PROBLEM — Z78.0 MENOPAUSE: Status: RESOLVED | Noted: 2019-07-26 | Resolved: 2020-08-13

## 2020-08-13 PROBLEM — R00.0 TACHYCARDIA: Status: RESOLVED | Noted: 2018-05-31 | Resolved: 2020-08-13

## 2020-08-13 PROBLEM — M25.552 LEFT HIP PAIN: Status: RESOLVED | Noted: 2018-02-16 | Resolved: 2020-08-13

## 2020-08-13 PROBLEM — G62.9 PERIPHERAL NEUROPATHY: Status: ACTIVE | Noted: 2020-08-13

## 2020-08-13 PROCEDURE — 99214 OFFICE O/P EST MOD 30 MIN: CPT | Performed by: INTERNAL MEDICINE

## 2020-08-13 RX ORDER — PROPRANOLOL HYDROCHLORIDE 10 MG/1
10 TABLET ORAL
COMMUNITY
Start: 2020-08-03 | End: 2021-03-01

## 2020-08-13 ASSESSMENT — PATIENT HEALTH QUESTIONNAIRE - PHQ9
4. FEELING TIRED OR HAVING LITTLE ENERGY: NOT AT ALL
5. POOR APPETITE OR OVEREATING: NOT AT ALL
6. FEELING BAD ABOUT YOURSELF - OR THAT YOU ARE A FAILURE OR HAVE LET YOURSELF OR YOUR FAMILY DOWN: NOT AL ALL
7. TROUBLE CONCENTRATING ON THINGS, SUCH AS READING THE NEWSPAPER OR WATCHING TELEVISION: NOT AT ALL
1. LITTLE INTEREST OR PLEASURE IN DOING THINGS: NOT AT ALL
SUM OF ALL RESPONSES TO PHQ9 QUESTIONS 1 AND 2: 0
SUM OF ALL RESPONSES TO PHQ QUESTIONS 1-9: 0
2. FEELING DOWN, DEPRESSED, IRRITABLE, OR HOPELESS: NOT AT ALL
8. MOVING OR SPEAKING SO SLOWLY THAT OTHER PEOPLE COULD HAVE NOTICED. OR THE OPPOSITE, BEING SO FIGETY OR RESTLESS THAT YOU HAVE BEEN MOVING AROUND A LOT MORE THAN USUAL: NOT AT ALL
3. TROUBLE FALLING OR STAYING ASLEEP OR SLEEPING TOO MUCH: NOT AT ALL
9. THOUGHTS THAT YOU WOULD BE BETTER OFF DEAD, OR OF HURTING YOURSELF: NOT AT ALL

## 2020-08-13 NOTE — PROGRESS NOTES
"New patient establish    Chief Complaint   Patient presents with   • Establish Care     spot on back        Subjective:     HPI:   Thierry presents today with the following.    1. Type 2 diabetes mellitus with diabetic polyneuropathy, without long-term current use of insulin ([facility])  A1c:   Lab Results   Component Value Date/Time    HBA1C 6.4 (A) 12/05/2019 1356    HBA1C 6.8 (H) 08/23/2019 0827    HBA1C 7.0 (H) 02/08/2019 0855    AVGLUC 148 08/23/2019 0827    AVGLUC 154 02/08/2019 0855    AVGLUC 146 06/01/2018 1216   -Trying to lose weight, eating healthier option, access to healthy dietary diabetic option  -She is compliant with Janumet  mg XR daily  -Patient has tingling numbness of the feet  -Currently following elimination diet with ketogenic diet with whole 30 program admission she is actively losing weight    2. Obstructive sleep apnea  -She is currently using dental appliance with good response per patient    3. Essential hypertension  Chronic, stable, compliant with losartan 50 mg daily, denied having related symptoms    4. Bilateral cold feet  5. Diabetic peripheral neuropathy associated with type 2 diabetes mellitus (HCC)  -Chronic, bilateral \"feet, tingling numbness of the feet, there is some callus of bilateral fingers metatarsophalangeal joint on the plantar aspect, sensation is impaired    Patient Active Problem List    Diagnosis Date Noted   • Bilateral cold feet 08/13/2020   • Diabetic peripheral neuropathy associated with type 2 diabetes mellitus (HCC) 08/13/2020   • Vitamin D deficiency 12/05/2019   • History of adjustable gastric banding 12/05/2019   • Obesity (BMI 30-39.9) 07/26/2019   • Panic attacks 07/26/2019   • Exercise-induced asthma 01/03/2019   • Mixed stress and urge urinary incontinence 05/31/2018   • Type 2 diabetes mellitus with diabetic polyneuropathy, without long-term current use of insulin ([facility]) 10/27/2016   • BMI 33.0-33.9,adult 10/27/2016   • Obstructive sleep apnea " 10/12/2016   • Insomnia 10/12/2016   • Essential hypertension 01/17/2012   • Hyperlipidemia associated with type 2 diabetes mellitus (HCC) 06/02/2010   • Recurrent major depressive disorder, in remission (HCC) 06/17/2009   • Allergic rhinitis 06/17/2009       Current Outpatient Medications on File Prior to Visit   Medication Sig Dispense Refill   • propranolol (INDERAL) 10 MG Tab Take 10 mg by mouth.     • ezetimibe-simvastatin (VYTORIN) 10-10 MG per tablet Take 1 tablet by mouth once daily 90 Tab 0   • venlafaxine (EFFEXOR) 75 MG Tab Take 1 tablet by mouth twice daily with food 180 Tab 0   • JANUMET XR  MG TABLET SR 24 HR Take 1 tablet by mouth once daily 90 Tab 0   • losartan (COZAAR) 50 MG Tab Take 1 tablet by mouth once daily 90 Tab 1   • fenofibrate (TRICOR) 48 MG Tab Take 1 tablet by mouth once daily 90 Tab 1   • montelukast (SINGULAIR) 10 MG Tab TAKE 1 TABLET BY MOUTH ONCE DAILY 90 Tab 1   • ipratropium-albuterol (DUONEB) 0.5-2.5 (3) MG/3ML nebulizer solution 3 mL by Nebulization route 4 times a day. 30 Bullet 2   • albuterol 108 (90 Base) MCG/ACT Aero Soln inhalation aerosol Inhale 2 Puffs by mouth every 6 hours as needed for Shortness of Breath. 8.5 g 4   • naproxen (NAPROSYN) 500 MG Tab Take 1 Tab by mouth 2 times a day, with meals. 90 Tab 0   • Misc. Devices Kit Glucometer (brand as accepted by insurance). To test fasting in the AM 1 Kit 0   • Lancet Device Misc MATCH TO CURRENT GLUCOMETER, To test fasting in the  Each 4   • Blood Glucose Monitoring Suppl SUPPLIES Misc Test Strips (brand as accepted by insurance)Sig: Use AM fasting. 100 Each 4     No current facility-administered medications on file prior to visit.        Allergies, past medical history, past surgical history, family history, social history reviewed and updated    ROS:  All other systems reviewed and are negative except as stated in the HPI       Physical Exam:     /80 (BP Location: Left arm, Patient Position: Sitting,  "BP Cuff Size: Large adult)   Pulse 94   Temp 36.8 °C (98.2 °F) (Temporal)   Ht 1.626 m (5' 4\")   Wt 84.3 kg (185 lb 12.8 oz)   SpO2 98%   BMI 31.89 kg/m²   General: Normal appearing. No distress.  ENT: oropharynx without exudates.    Eyes: conjunctiva clear lids without ptosis  Pulmonary: Clear to ausculation.  Normal effort.   Cardiovascular: Regular rate and rhythm  Abdomen: Soft, nontender,  Lymph: No cervical or supraclavicular palpable lymph nodes  Psych: Normal mood and affect.   Monofilament testing with a 10 gram force: sensation intact: decreased bilaterally  Visual Inspection: Feet without maceration, ulcers, fissures.  Pedal pulses: decreased bilaterally    I have reviewed pertinent labs and diagnostic tests associated with this visit with specific comments listed under the assessment and plan below      Assessment and Plan:     55 y.o. female with the following issues.    1. Type 2 diabetes mellitus with diabetic polyneuropathy, without long-term current use of insulin (HCC)  >> Continue Janumet, may change to SGLT no better with metformin for kidney protection as well, may need to add GLP agonist as well  - VITAMIN D,25 HYDROXY; Future  - VIT B12,  FOLIC ACID  - Comp Metabolic Panel; Future  - Lipid Profile; Future  - US-EXTREMITY ARTERY LOWER BILAT W/LUCY (COMBO); Future  - HEMOGLOBIN A1C; Future    2. Obstructive sleep apnea  -Continue dental appliance    3. Essential hypertension  Continue blood pressure medication    4. Bilateral cold feet  5. Diabetic peripheral neuropathy associated with type 2 diabetes mellitus (HCC)  - US-EXTREMITY ARTERY LOWER BILAT W/LUCY (COMBO); Future    Follow Up:      Return in about 4 weeks (around 9/10/2020) for follow up, diabetes.    Please note that this dictation was created using voice recognition software. I have made every reasonable attempt to correct obvious errors, but I expect that there are errors of grammar and possibly content that I did not discover " before finalizing the note.    Signed by: Ebonie Loomis M.D.

## 2020-08-14 ENCOUNTER — HOSPITAL ENCOUNTER (OUTPATIENT)
Dept: LAB | Facility: MEDICAL CENTER | Age: 55
End: 2020-08-14
Attending: INTERNAL MEDICINE
Payer: COMMERCIAL

## 2020-08-14 DIAGNOSIS — E11.42 TYPE 2 DIABETES MELLITUS WITH DIABETIC POLYNEUROPATHY, WITHOUT LONG-TERM CURRENT USE OF INSULIN (HCC): ICD-10-CM

## 2020-08-14 LAB
25(OH)D3 SERPL-MCNC: 40 NG/ML (ref 30–100)
ALBUMIN SERPL BCP-MCNC: 4.3 G/DL (ref 3.2–4.9)
ALBUMIN/GLOB SERPL: 1.5 G/DL
ALP SERPL-CCNC: 46 U/L (ref 30–99)
ALT SERPL-CCNC: 20 U/L (ref 2–50)
ANION GAP SERPL CALC-SCNC: 14 MMOL/L (ref 7–16)
AST SERPL-CCNC: 22 U/L (ref 12–45)
BILIRUB SERPL-MCNC: 0.4 MG/DL (ref 0.1–1.5)
BUN SERPL-MCNC: 18 MG/DL (ref 8–22)
CALCIUM SERPL-MCNC: 9.8 MG/DL (ref 8.5–10.5)
CHLORIDE SERPL-SCNC: 104 MMOL/L (ref 96–112)
CHOLEST SERPL-MCNC: 263 MG/DL (ref 100–199)
CO2 SERPL-SCNC: 23 MMOL/L (ref 20–33)
CREAT SERPL-MCNC: 0.86 MG/DL (ref 0.5–1.4)
EST. AVERAGE GLUCOSE BLD GHB EST-MCNC: 128 MG/DL
FASTING STATUS PATIENT QL REPORTED: NORMAL
FOLATE SERPL-MCNC: 17.3 NG/ML
GLOBULIN SER CALC-MCNC: 2.8 G/DL (ref 1.9–3.5)
GLUCOSE SERPL-MCNC: 127 MG/DL (ref 65–99)
HBA1C MFR BLD: 6.1 % (ref 0–5.6)
HDLC SERPL-MCNC: 32 MG/DL
LDLC SERPL CALC-MCNC: 187 MG/DL
POTASSIUM SERPL-SCNC: 5 MMOL/L (ref 3.6–5.5)
PROT SERPL-MCNC: 7.1 G/DL (ref 6–8.2)
SODIUM SERPL-SCNC: 141 MMOL/L (ref 135–145)
TRIGL SERPL-MCNC: 218 MG/DL (ref 0–149)
VIT B12 SERPL-MCNC: 306 PG/ML (ref 211–911)

## 2020-08-14 PROCEDURE — 80061 LIPID PANEL: CPT

## 2020-08-14 PROCEDURE — 82607 VITAMIN B-12: CPT

## 2020-08-14 PROCEDURE — 80053 COMPREHEN METABOLIC PANEL: CPT

## 2020-08-14 PROCEDURE — 83036 HEMOGLOBIN GLYCOSYLATED A1C: CPT

## 2020-08-14 PROCEDURE — 82746 ASSAY OF FOLIC ACID SERUM: CPT

## 2020-08-14 PROCEDURE — 82306 VITAMIN D 25 HYDROXY: CPT

## 2020-08-14 PROCEDURE — 36415 COLL VENOUS BLD VENIPUNCTURE: CPT

## 2020-08-21 ENCOUNTER — HOSPITAL ENCOUNTER (OUTPATIENT)
Dept: RADIOLOGY | Facility: MEDICAL CENTER | Age: 55
End: 2020-08-21
Attending: INTERNAL MEDICINE
Payer: COMMERCIAL

## 2020-08-21 DIAGNOSIS — E11.42 DIABETIC PERIPHERAL NEUROPATHY ASSOCIATED WITH TYPE 2 DIABETES MELLITUS (HCC): ICD-10-CM

## 2020-08-21 DIAGNOSIS — R20.9 BILATERAL COLD FEET: ICD-10-CM

## 2020-08-21 DIAGNOSIS — E11.42 TYPE 2 DIABETES MELLITUS WITH DIABETIC POLYNEUROPATHY, WITHOUT LONG-TERM CURRENT USE OF INSULIN (HCC): ICD-10-CM

## 2020-08-21 PROCEDURE — 93922 UPR/L XTREMITY ART 2 LEVELS: CPT

## 2020-08-27 DIAGNOSIS — J30.1 SEASONAL ALLERGIC RHINITIS DUE TO POLLEN: ICD-10-CM

## 2020-09-01 RX ORDER — MONTELUKAST SODIUM 10 MG/1
TABLET ORAL
Qty: 90 TAB | Refills: 0 | Status: SHIPPED | OUTPATIENT
Start: 2020-09-01 | End: 2020-12-22

## 2020-09-03 ENCOUNTER — HOSPITAL ENCOUNTER (OUTPATIENT)
Dept: RADIOLOGY | Facility: MEDICAL CENTER | Age: 55
End: 2020-09-03
Attending: INTERNAL MEDICINE
Payer: COMMERCIAL

## 2020-09-03 DIAGNOSIS — Z12.31 ENCOUNTER FOR SCREENING MAMMOGRAM FOR BREAST CANCER: ICD-10-CM

## 2020-09-03 PROCEDURE — 77067 SCR MAMMO BI INCL CAD: CPT

## 2020-09-10 ENCOUNTER — OFFICE VISIT (OUTPATIENT)
Dept: MEDICAL GROUP | Facility: PHYSICIAN GROUP | Age: 55
End: 2020-09-10
Payer: COMMERCIAL

## 2020-09-10 VITALS
HEIGHT: 64 IN | HEART RATE: 94 BPM | DIASTOLIC BLOOD PRESSURE: 78 MMHG | SYSTOLIC BLOOD PRESSURE: 116 MMHG | OXYGEN SATURATION: 96 % | BODY MASS INDEX: 31.31 KG/M2 | WEIGHT: 183.4 LBS | TEMPERATURE: 99 F

## 2020-09-10 DIAGNOSIS — E11.42 TYPE 2 DIABETES MELLITUS WITH DIABETIC POLYNEUROPATHY, WITHOUT LONG-TERM CURRENT USE OF INSULIN (HCC): ICD-10-CM

## 2020-09-10 DIAGNOSIS — G44.209 TENSION HEADACHE: ICD-10-CM

## 2020-09-10 DIAGNOSIS — E78.5 HYPERLIPIDEMIA ASSOCIATED WITH TYPE 2 DIABETES MELLITUS (HCC): ICD-10-CM

## 2020-09-10 DIAGNOSIS — I10 ESSENTIAL HYPERTENSION: ICD-10-CM

## 2020-09-10 DIAGNOSIS — E66.9 OBESITY (BMI 30-39.9): ICD-10-CM

## 2020-09-10 DIAGNOSIS — E11.69 HYPERLIPIDEMIA ASSOCIATED WITH TYPE 2 DIABETES MELLITUS (HCC): ICD-10-CM

## 2020-09-10 PROCEDURE — 99214 OFFICE O/P EST MOD 30 MIN: CPT | Performed by: INTERNAL MEDICINE

## 2020-09-10 RX ORDER — METFORMIN HYDROCHLORIDE EXTENDED-RELEASE TABLETS 1000 MG/1
1000 TABLET, FILM COATED, EXTENDED RELEASE ORAL
Qty: 90 TAB | Refills: 1 | Status: SHIPPED | OUTPATIENT
Start: 2020-09-10 | End: 2021-06-03 | Stop reason: SDUPTHER

## 2020-09-10 RX ORDER — SITAGLIPTIN AND METFORMIN HYDROCHLORIDE 1000; 50 MG/1; MG/1
TABLET, FILM COATED, EXTENDED RELEASE ORAL
Qty: 90 TAB | Refills: 1 | Status: SHIPPED | OUTPATIENT
Start: 2020-09-10 | End: 2020-09-10

## 2020-09-10 RX ORDER — ATORVASTATIN CALCIUM 20 MG/1
20 TABLET, FILM COATED ORAL DAILY
Qty: 90 TAB | Refills: 1 | Status: SHIPPED | OUTPATIENT
Start: 2020-09-10 | End: 2021-03-23

## 2020-09-10 RX ORDER — NAPROXEN 500 MG/1
500 TABLET ORAL 2 TIMES DAILY WITH MEALS
Qty: 90 TAB | Refills: 1 | Status: SHIPPED | OUTPATIENT
Start: 2020-09-10 | End: 2021-10-15

## 2020-09-11 NOTE — PROGRESS NOTES
Established Patient    Chief Complaint   Patient presents with   • Follow-Up       Subjective:     HPI:   Thierry presents today with the following.    2. Type 2 diabetes mellitus with diabetic polyneuropathy, without long-term current use of insulin ([facility])  4. Obesity (BMI 30-39.9)  A1c:   Lab Results   Component Value Date/Time    HBA1C 6.1 (H) 08/14/2020 0803    HBA1C 6.4 (A) 12/05/2019 1356    HBA1C 6.8 (H) 08/23/2019 0827    AVGLUC 128 08/14/2020 0803    AVGLUC 148 08/23/2019 0827    AVGLUC 154 02/08/2019 0855     >> Patient continue losing weight, improving lifestyle, compliant with diabetic diet, mention check blood glucose before breakfast with range of 10 5-1 31, average  Low 110s  -Patient otherwise denied having related symptoms    5. Hyperlipidemia associated with type 2 diabetes mellitus ([facility])  LIPID:  Lab Results   Component Value Date/Time    CHOLSTRLTOT 263 (H) 08/14/2020 08:03 AM    CHOLSTRLTOT 196 12/06/2019 08:32 AM     (H) 08/14/2020 08:03 AM     (H) 12/06/2019 08:32 AM    HDL 32 (A) 08/14/2020 08:03 AM    HDL 39 (A) 12/06/2019 08:32 AM    TRIGLYCERIDE 218 (H) 08/14/2020 08:03 AM    TRIGLYCERIDE 221 (H) 12/06/2019 08:32 AM   >> She is compliant with ezetimibe-simvastatin 10-10 mg daily, fenofibrate 48 mg daily, she is also performing keto diet that usually increase LDL as well        Patient Active Problem List    Diagnosis Date Noted   • Bilateral cold feet 08/13/2020   • Diabetic peripheral neuropathy associated with type 2 diabetes mellitus ([facility]) 08/13/2020   • Vitamin D deficiency 12/05/2019   • History of adjustable gastric banding 12/05/2019   • Obesity (BMI 30-39.9) 07/26/2019   • Panic attacks 07/26/2019   • Exercise-induced asthma 01/03/2019   • Mixed stress and urge urinary incontinence 05/31/2018   • Type 2 diabetes mellitus with diabetic polyneuropathy, without long-term current use of insulin ([facility]) 10/27/2016   • BMI 33.0-33.9,adult 10/27/2016   • Obstructive sleep apnea  "10/12/2016   • Insomnia 10/12/2016   • Essential hypertension 01/17/2012   • Hyperlipidemia associated with type 2 diabetes mellitus (HCC) 06/02/2010   • Recurrent major depressive disorder, in remission (HCC) 06/17/2009   • Allergic rhinitis 06/17/2009       Current Outpatient Medications on File Prior to Visit   Medication Sig Dispense Refill   • montelukast (SINGULAIR) 10 MG Tab Take 1 tablet by mouth once daily 90 Tab 0   • propranolol (INDERAL) 10 MG Tab Take 10 mg by mouth.     • venlafaxine (EFFEXOR) 75 MG Tab Take 1 tablet by mouth twice daily with food 180 Tab 0   • losartan (COZAAR) 50 MG Tab Take 1 tablet by mouth once daily 90 Tab 1   • fenofibrate (TRICOR) 48 MG Tab Take 1 tablet by mouth once daily 90 Tab 1   • ipratropium-albuterol (DUONEB) 0.5-2.5 (3) MG/3ML nebulizer solution 3 mL by Nebulization route 4 times a day. 30 Bullet 2   • albuterol 108 (90 Base) MCG/ACT Aero Soln inhalation aerosol Inhale 2 Puffs by mouth every 6 hours as needed for Shortness of Breath. 8.5 g 4   • Misc. Devices Kit Glucometer (brand as accepted by insurance). To test fasting in the AM 1 Kit 0   • Lancet Device Misc MATCH TO CURRENT GLUCOMETER, To test fasting in the  Each 4   • Blood Glucose Monitoring Suppl SUPPLIES Misc Test Strips (brand as accepted by insurance)Sig: Use AM fasting. 100 Each 4     No current facility-administered medications on file prior to visit.        Allergies, past medical history, past surgical history, family history, social history reviewed and updated    ROS:  All other systems reviewed and are negative except as stated in the HPI     Physical Exam:     /78 (BP Location: Right arm, Patient Position: Sitting, BP Cuff Size: Adult)   Pulse 94   Temp 37.2 °C (99 °F) (Temporal)   Ht 1.626 m (5' 4\")   Wt 83.2 kg (183 lb 6.4 oz)   SpO2 96%   BMI 31.48 kg/m²   General: Normal appearing. No distress.  ENT: oropharynx without exudates.    Eyes: conjunctiva clear lids without " ptosis  Pulmonary: Clear to ausculation.  Normal effort.   Cardiovascular: Regular rate and rhythm  Abdomen: Soft, nontender,  Lymph: No cervical or supraclavicular palpable lymph nodes  Psych: Normal mood and affect.     I have reviewed pertinent labs and diagnostic tests associated with this visit with specific comments listed under the assessment and plan below      Assessment and Plan:     55 y.o. female with the following issues.    2. Type 2 diabetes mellitus with diabetic polyneuropathy, without long-term current use of insulin (HCC)  4. Obesity (BMI 30-39.9)  - metFORMIN ER 1000 MG TABLET SR 24 HR; Take 1 Tab by mouth with dinner.  Dispense: 90 Tab; Refill: 1>> change from Janumet XR  mg daily  for a diabetes check:    -Discussed diabetic diet in detail, educated regarding management of hypoglycemia, advised regular exercise, educated disease management and weight goals as well as feet care and frequent check of feet.  -Patient to check early morning fasting blood glucose with goal discussed.  - asked to have a BG log with daily fasting and 2 hr postprandial after biggest meal and bring to next visit or send through my chart  -Discussed side effects of medication. Patient confirmed understanding of information.      5. Hyperlipidemia associated with type 2 diabetes mellitus (HCC)  - atorvastatin (LIPITOR) 20 MG Tab; Take 1 Tab by mouth every day.  Dispense: 90 Tab; Refill: 1  -Continue fenofibrate for now      Follow Up:      Return in about 3 months (around 12/10/2020) for follow up, A1c, obesity, diabetes.   Second    Please note that this dictation was created using voice recognition software. I have made every reasonable attempt to correct obvious errors, but I expect that there are errors of grammar and possibly content that I did not discover before finalizing the note.    Signed by: Ebonie Loomis M.D.

## 2020-09-11 NOTE — PATIENT INSTRUCTIONS
Vitamin B complex supplement, B12 1000 mcg daily  Magnesium supplement 200 to 400 mg daily  Vitamin D3 supplement 2000 units daily   Essential oil supplement (cod liver oil)      LIFESTYLE MEDICINE:       1) Make SMART lifestyle changes: The lifestyle changes that you need to make are with regards to: nutrition, cardiovascular exercise, sleep, stress management.         2) Nutrition:     1- Reduce carbohydrates, no added sugar at all, try to avoid hidden carbohydrates (including breads, pasta, cereals/oatmeal).  Avoid soda, processed carbs and sugars including cookies, candies, donuts, jellies, avoid all added sugar beverages including avoidance of diet soda/Gatorade, Powerade, Malcolm-Aid, sweetened ice tea.  Avoid all the sweeteners.    2- Eat vegetables (organic is much better to avoid herbicide/insecticide): including green leafy vegetables, steamed or cooked with healthy oils such as olive oil, avocado oil or coconut oil.  Avoid vegetable oil (sunflower, soy, corn, canola, sufflower, cottonseed or peanut oil).  Include cruciferous vegetables in your diets such as kale, cabbage, cauliflower, [place] sprouts, broccoli, [employer]    3-Try to focus on fruits with low glycemic index (less amount of fructose sugar), best fruits that are rich with vitamin/minerals as well as antioxidants are berries (you could take up to 1 cup of Berries a day). Avoid fruit juices ( even worse than SODA).     4-Eat healthy fat and meats from grass fed animals (grass fed cow/lamb meat, grass fed chicken/poultry, wild-caught fish and seafood) as well as pasteurized eggs from grass fed chicken    5-when you eat dairy foods, eat as a whole fat with no added sugar, sweetener or flavors.  You could add berries, nuts or seeds into the yogurt.  Avoid skimmed milk/free fat milk/2% milk    6-Eat when you feel hungry. Avoid meals if you don't feel hungry.    7-1 big glass of water, mix  with lemon, add 1-2 tablespoon of apple cider vinegar as  well as 2 cloves of garlic>> very helpful    8-INTERMITTENT FASTING is very very very powerful           3) Cardiovascular Exercise: The center for disease control recommends a minimum of 150 minutes per week of moderate intensity cardiovascular exercise for weight maintenance and cardiovascular health.  Set this as your initial goal, with at least 30 minutes per session. Types of exercise can include 30 minutes of elliptical, 30 minutes of decently fast jog, 30 minutes of swimming, 30 minutes of heavy gardening (lifting big bags of fertilizer, digging deep holes/ditches).  You can cut down the minute requirements to half, by doing higher intensity sports such as a game of tennis, or soccer.        4) Sleep:    A) Goal: Obtain a minimum of 7-8hours of continuous, uninterrupted, restful sleep per night.    B) Tips for Sleep Hygiene:    I) Go to bed and wake up at consistent times whether work/school day or not.   II) Keep room dark, quiet, and comfortable.  Increase exposure to sunlight during awake times and avoid bright lights (especially anything with a backlight) at least the last 1-2hours before going to sleep.     III) Avoid stimulant or caffeine use in the evening     5) Stress Management: You cannot change the stresses of life necessarily, but you can change how he responds to them. One good way to manage stress is to write things down in order to help you process how to approach things in general or specifically. Another good way is to talk it out with someone you trusts, specifically your significant other or good friend. A definite great way to deal with stress is to have cardiovascular exercise!

## 2020-09-17 DIAGNOSIS — I10 ESSENTIAL HYPERTENSION: ICD-10-CM

## 2020-09-18 RX ORDER — LOSARTAN POTASSIUM 50 MG/1
TABLET ORAL
Qty: 90 TAB | Refills: 0 | Status: SHIPPED | OUTPATIENT
Start: 2020-09-18 | End: 2020-12-22

## 2020-09-18 NOTE — TELEPHONE ENCOUNTER
Last seen by PCP 09/10/2020.   Last Blood Pressure reading was 116/78 on 9/10/2020    Lab Results   Component Value Date/Time    SODIUM 141 08/14/2020 08:03 AM    POTASSIUM 5.0 08/14/2020 08:03 AM    CHLORIDE 104 08/14/2020 08:03 AM    CO2 23 08/14/2020 08:03 AM    GLUCOSE 127 (H) 08/14/2020 08:03 AM    BUN 18 08/14/2020 08:03 AM    CREATININE 0.86 08/14/2020 08:03 AM       Will send 6 month(s) to the pharmacy.

## 2020-09-23 ENCOUNTER — OFFICE VISIT (OUTPATIENT)
Dept: MEDICAL GROUP | Facility: PHYSICIAN GROUP | Age: 55
End: 2020-09-23
Payer: COMMERCIAL

## 2020-09-23 VITALS
OXYGEN SATURATION: 98 % | DIASTOLIC BLOOD PRESSURE: 56 MMHG | BODY MASS INDEX: 30.39 KG/M2 | HEART RATE: 98 BPM | WEIGHT: 178 LBS | HEIGHT: 64 IN | TEMPERATURE: 97.4 F | SYSTOLIC BLOOD PRESSURE: 118 MMHG

## 2020-09-23 DIAGNOSIS — N63.20 LEFT BREAST LUMP: ICD-10-CM

## 2020-09-23 PROCEDURE — 99214 OFFICE O/P EST MOD 30 MIN: CPT | Performed by: FAMILY MEDICINE

## 2020-09-24 NOTE — PROGRESS NOTES
CC: Left breast lump    HISTORY OF THE PRESENT ILLNESS: Patient is a 55 y.o. female. This pleasant patient is here today to discuss the following issues.    Patient is here today with concern for left breast lump.  She notes that it started on Saturday.  She was having quite a lot of pain and a palpably large lump in her left breast.  The lump is since gone down as has the pain.  At that time she did not have any skin changes, nipple discharge.  She just recently had a screening mammogram on September 3 which was normal.  However, the lump came up after the mammogram.  She denies fevers or chills.    Allergies: Nkda [no known drug allergy]    Current Outpatient Medications Ordered in Epic   Medication Sig Dispense Refill   • losartan (COZAAR) 50 MG Tab Take 1 tablet by mouth once daily 90 Tab 0   • atorvastatin (LIPITOR) 20 MG Tab Take 1 Tab by mouth every day. 90 Tab 1   • metFORMIN ER 1000 MG TABLET SR 24 HR Take 1 Tab by mouth with dinner. 90 Tab 1   • naproxen (NAPROSYN) 500 MG Tab Take 1 Tab by mouth 2 times a day, with meals. 90 Tab 1   • montelukast (SINGULAIR) 10 MG Tab Take 1 tablet by mouth once daily 90 Tab 0   • propranolol (INDERAL) 10 MG Tab Take 10 mg by mouth.     • venlafaxine (EFFEXOR) 75 MG Tab Take 1 tablet by mouth twice daily with food 180 Tab 0   • fenofibrate (TRICOR) 48 MG Tab Take 1 tablet by mouth once daily 90 Tab 1   • ipratropium-albuterol (DUONEB) 0.5-2.5 (3) MG/3ML nebulizer solution 3 mL by Nebulization route 4 times a day. 30 Bullet 2   • albuterol 108 (90 Base) MCG/ACT Aero Soln inhalation aerosol Inhale 2 Puffs by mouth every 6 hours as needed for Shortness of Breath. 8.5 g 4   • Misc. Devices Kit Glucometer (brand as accepted by insurance). To test fasting in the AM 1 Kit 0   • Lancet Device Misc MATCH TO CURRENT GLUCOMETER, To test fasting in the  Each 4   • Blood Glucose Monitoring Suppl SUPPLIES Misc Test Strips (brand as accepted by insurance)Sig: Use AM fasting. 100  Each 4     No current [facility]-ordered facility-administered medications on file.        Past Medical History:   Diagnosis Date   • Anxiety    • Chickenpox    • [place] measles    • Heart burn    • Hiatus hernia syndrome    • Hyperlipidemia    • Hypertension    • Influenza    • Left hip pain 2/16/2018   • Mumps    • Pain     stomach   • Psychiatric problem     depression   • Sleep apnea    • Tachycardia 5/31/2018   • Tonsillitis        Past Surgical History:   Procedure Laterality Date   • HIATAL HERNIA REPAIR  6/16/2011    Performed by RAMSES GALVEZ at SUR[facility]ERY [facility]   • LAPAROSCOPY  6/16/2011    Performed by RAMSES GALVEZ at SURGERY [facility]   • LAPAROSCOPY  4/4/2011    Lap band revision:Performed by RAMSES GALVEZ at SURGERY [facility]   • COLONOSCOPY  2008   • GASTRIC BANDING LAPAROSCOPIC  10/2007   • OTHER SURGICAL PROCEDURE  2003    uterine ablation   • TUBAL COAGULATION LAPAROSCOPIC BILATERAL  1995   • ENDOMETRIAL ABLATION         Social History     Tobacco Use   • Smoking status: Never Smoker   • Smokeless tobacco: Never Used   • Tobacco comment: avoid all tobacco products   Substance Use Topics   • Alcohol use: Not Currently     Alcohol/week: 1.2 oz     Types: 2 Shots of liquor per week     Frequency: 2-3 times a week     Drinks per session: 1 or 2     Binge frequency: Never   • Drug use: No       Social History     Social History Narrative   • Not on file       Family History   Problem Relation Age of Onset   • Hypertension Mother    • Diabetes Mother    • Stroke Mother    • Heart Disease Mother    • Genetic Disorder Father         Parkinsons   • Hypertension Father    • Hyperlipidemia Father    • Hypertension Sister    • Diabetes Maternal Uncle    • Diabetes Paternal Aunt    • Diabetes Paternal Uncle    • Genetic Disorder Maternal Grandmother         kidney failure   • Arthritis Maternal Grandfather    • Heart Disease Maternal Grandfather    • Stroke Other    • Sleep Apnea Neg Hx   "      ROS:     - Constitutional: Negative for fever, chills, unexpected weight change, and fatigue/generalized weakness.     - Respiratory: Negative for cough, sputum production, chest congestion, dyspnea, wheezing, and crackles.      - Cardiovascular: Negative for chest pain, palpitations, orthopnea, PND, and bilateral lower extremity edema.     Exam: /56 (BP Location: Right arm, Patient Position: Sitting, BP Cuff Size: Adult)   Pulse 98   Temp 36.3 °C (97.4 °F) (Temporal)   Ht 1.626 m (5' 4\")   Wt 80.7 kg (178 lb)   SpO2 98%  Body mass index is 30.55 kg/m².    General: Well appearing, NAD  Breast: Breasts are normal in appearance.  No skin dimpling or skin changes.  No nipple discharge.  Right breast without any palpable lumps or masses and no axillary lymphadenopathy.  Left breast with palpable tender lump about 10:00.  No left axillary lymphadenopathy.  Skin: Warm and dry.  No obvious lesions.  Musculoskeletal:  No extremity cyanosis, clubbing, or edema.  Psych: Normal mood and affect. Alert and oriented. Judgment and insight is normal.    Please note that this dictation was created using voice recognition software. I have made every reasonable attempt to correct obvious errors, but I expect that there are errors of grammar and possibly content that I did not discover before finalizing the note.      Assessment/Plan  Thierry was seen today for breast mass.    Diagnoses and all orders for this visit:    Left breast lump  New problem for the patient.  I understand she just did have a normal screening mammogram less than a month ago.  However, she does have a new palpable left breast lump noted on exam today.  I would prefer to be on the cautious side and get an ultrasound and diagnostic mammo-completed at this time, though I think malignancy very unlikely.  Patient in agreement with this plan.  -     US-BREAST LIMITED-LEFT; Future  -     MA-DIAGNOSTIC MAMMO LEFT W/TOMOSYNTHESIS W/O CAD; " Future      Follow-up if symptoms not improving.    Ashleigh Lang DO  [facility]

## 2020-09-29 DIAGNOSIS — J06.9 VIRAL URI WITH COUGH: ICD-10-CM

## 2020-10-02 ENCOUNTER — HOSPITAL ENCOUNTER (OUTPATIENT)
Dept: RADIOLOGY | Facility: MEDICAL CENTER | Age: 55
End: 2020-10-02
Attending: FAMILY MEDICINE
Payer: COMMERCIAL

## 2020-10-02 DIAGNOSIS — N63.20 LEFT BREAST LUMP: ICD-10-CM

## 2020-10-02 PROCEDURE — 76642 ULTRASOUND BREAST LIMITED: CPT | Mod: LT

## 2020-10-05 RX ORDER — ALBUTEROL SULFATE 90 UG/1
2 AEROSOL, METERED RESPIRATORY (INHALATION) EVERY 6 HOURS PRN
Qty: 8.5 G | Refills: 4 | Status: SHIPPED | OUTPATIENT
Start: 2020-10-05

## 2020-10-20 RX ORDER — VENLAFAXINE 75 MG/1
TABLET ORAL
Qty: 180 TAB | Refills: 0 | Status: SHIPPED | OUTPATIENT
Start: 2020-10-20 | End: 2020-12-22

## 2020-12-18 DIAGNOSIS — I10 ESSENTIAL HYPERTENSION: ICD-10-CM

## 2020-12-18 DIAGNOSIS — J30.1 SEASONAL ALLERGIC RHINITIS DUE TO POLLEN: ICD-10-CM

## 2020-12-22 RX ORDER — VENLAFAXINE 75 MG/1
TABLET ORAL
Qty: 180 TAB | Refills: 1 | Status: SHIPPED | OUTPATIENT
Start: 2020-12-22

## 2020-12-22 RX ORDER — MONTELUKAST SODIUM 10 MG/1
TABLET ORAL
Qty: 90 TAB | Refills: 1 | Status: SHIPPED | OUTPATIENT
Start: 2020-12-22 | End: 2021-08-30

## 2020-12-22 RX ORDER — LOSARTAN POTASSIUM 50 MG/1
TABLET ORAL
Qty: 90 TAB | Refills: 1 | Status: SHIPPED | OUTPATIENT
Start: 2020-12-22 | End: 2021-07-08

## 2020-12-22 NOTE — TELEPHONE ENCOUNTER
Refill X 6 months, sent to pharmacy.Pt. Seen in the last 6 months per protocol.   Lab Results   Component Value Date/Time    SODIUM 141 08/14/2020 08:03 AM    POTASSIUM 5.0 08/14/2020 08:03 AM    CHLORIDE 104 08/14/2020 08:03 AM    CO2 23 08/14/2020 08:03 AM    GLUCOSE 127 (H) 08/14/2020 08:03 AM    BUN 18 08/14/2020 08:03 AM    CREATININE 0.86 08/14/2020 08:03 AM

## 2021-03-01 ENCOUNTER — APPOINTMENT (OUTPATIENT)
Dept: RADIOLOGY | Facility: IMAGING CENTER | Age: 56
End: 2021-03-01
Attending: PHYSICIAN ASSISTANT
Payer: COMMERCIAL

## 2021-03-01 ENCOUNTER — OFFICE VISIT (OUTPATIENT)
Dept: URGENT CARE | Facility: PHYSICIAN GROUP | Age: 56
End: 2021-03-01
Payer: COMMERCIAL

## 2021-03-01 VITALS
RESPIRATION RATE: 16 BRPM | OXYGEN SATURATION: 97 % | BODY MASS INDEX: 31.41 KG/M2 | HEART RATE: 107 BPM | TEMPERATURE: 98.1 F | HEIGHT: 64 IN | WEIGHT: 184 LBS | DIASTOLIC BLOOD PRESSURE: 88 MMHG | SYSTOLIC BLOOD PRESSURE: 114 MMHG

## 2021-03-01 DIAGNOSIS — M54.6 ACUTE MIDLINE THORACIC BACK PAIN: ICD-10-CM

## 2021-03-01 DIAGNOSIS — M62.830 MUSCLE SPASM OF BACK: ICD-10-CM

## 2021-03-01 DIAGNOSIS — G89.11 ACUTE LOW BACK PAIN DUE TO TRAUMA: ICD-10-CM

## 2021-03-01 DIAGNOSIS — M54.50 ACUTE LOW BACK PAIN DUE TO TRAUMA: ICD-10-CM

## 2021-03-01 PROCEDURE — 99214 OFFICE O/P EST MOD 30 MIN: CPT | Performed by: PHYSICIAN ASSISTANT

## 2021-03-01 PROCEDURE — 72100 X-RAY EXAM L-S SPINE 2/3 VWS: CPT | Mod: TC | Performed by: PHYSICIAN ASSISTANT

## 2021-03-01 PROCEDURE — 72072 X-RAY EXAM THORAC SPINE 3VWS: CPT | Mod: TC | Performed by: PHYSICIAN ASSISTANT

## 2021-03-01 RX ORDER — KETOROLAC TROMETHAMINE 30 MG/ML
30 INJECTION, SOLUTION INTRAMUSCULAR; INTRAVENOUS ONCE
Status: COMPLETED | OUTPATIENT
Start: 2021-03-01 | End: 2021-03-01

## 2021-03-01 RX ORDER — CYCLOBENZAPRINE HCL 10 MG
10 TABLET ORAL
Qty: 10 TABLET | Refills: 0 | Status: SHIPPED | OUTPATIENT
Start: 2021-03-01 | End: 2021-03-11

## 2021-03-01 RX ADMIN — KETOROLAC TROMETHAMINE 30 MG: 30 INJECTION, SOLUTION INTRAMUSCULAR; INTRAVENOUS at 09:52

## 2021-03-01 ASSESSMENT — ENCOUNTER SYMPTOMS
PARESIS: 0
EYE REDNESS: 0
TINGLING: 0
LOSS OF CONSCIOUSNESS: 0
ABDOMINAL PAIN: 0
LEG PAIN: 0
DOUBLE VISION: 0
EYE DISCHARGE: 0
PHOTOPHOBIA: 0
BLURRED VISION: 0
FALLS: 1
BOWEL INCONTINENCE: 0
PERIANAL NUMBNESS: 0
NUMBNESS: 0
DIZZINESS: 0
BACK PAIN: 1
WEAKNESS: 0
PARESTHESIAS: 0
EYE PAIN: 0
HEADACHES: 1

## 2021-03-01 NOTE — PROGRESS NOTES
Subjective:   Thierry Dinh is a 55 y.o. female who presents today with   Chief Complaint   Patient presents with   • Back Injury     Lower back pain, constant pain, muscle spasms, happened last night      Back Pain  This is a new problem. The current episode started yesterday. The problem occurs constantly. The problem is unchanged. The pain is present in the lumbar spine and thoracic spine. The quality of the pain is described as aching. The pain is moderate. The symptoms are aggravated by bending, position and twisting. Associated symptoms include headaches. Pertinent negatives include no abdominal pain, bladder incontinence, bowel incontinence, chest pain, leg pain, numbness, paresis, paresthesias, pelvic pain, perianal numbness, tingling or weakness. She has tried NSAIDs for the symptoms. The treatment provided mild relief.     Patient denies any LOC or nausea and states she was aware of the entire event. States back spasms have been most discomforting.  Has a previous injury to thoracic back from similar injury. Had a fracture to the thoracic spine at that time.  PMH:  has a past medical history of Anxiety, Chickenpox, [place] measles, Heart burn, Hiatus hernia syndrome, Hyperlipidemia, Hypertension, Influenza, Left hip pain (2/16/2018), Mumps, Pain, Psychiatric problem, Sleep apnea, Tachycardia (5/31/2018), and Tonsillitis.  MEDS:   Current Outpatient Medications:   •  montelukast (SINGULAIR) 10 MG Tab, Take 1 tablet by mouth once daily, Disp: 90 Tab, Rfl: 1  •  venlafaxine (EFFEXOR) 75 MG Tab, Take 1 tablet by mouth twice daily with food, Disp: 180 Tab, Rfl: 1  •  losartan (COZAAR) 50 MG Tab, Take 1 tablet by mouth once daily, Disp: 90 Tab, Rfl: 1  •  albuterol 108 (90 Base) MCG/ACT Aero Soln inhalation aerosol, Inhale 2 Puffs by mouth every 6 hours as needed for Shortness of Breath., Disp: 8.5 g, Rfl: 4  •  atorvastatin (LIPITOR) 20 MG Tab, Take 1 Tab by mouth every day., Disp: 90 Tab, Rfl: 1  •   metFORMIN ER 1000 MG TABLET SR 24 HR, Take 1 Tab by mouth with dinner., Disp: 90 Tab, Rfl: 1  •  naproxen (NAPROSYN) 500 MG Tab, Take 1 Tab by mouth 2 times a day, with meals., Disp: 90 Tab, Rfl: 1  •  ipratropium-albuterol (DUONEB) 0.5-2.5 (3) MG/3ML nebulizer solution, 3 mL by Nebulization route 4 times a day., Disp: 30 Bullet, Rfl: 2  •  Misc. Devices Kit, Glucometer (brand as accepted by insurance). To test fasting in the AM, Disp: 1 Kit, Rfl: 0  •  Lancet Device Misc, MATCH TO CURRENT GLUCOMETER, To test fasting in the AM, Disp: 100 Each, Rfl: 4  •  Blood Glucose Monitoring Suppl SUPPLIES Misc, Test Strips (brand as accepted by insurance)Sig: Use AM fasting., Disp: 100 Each, Rfl: 4    Current Facility-Administered Medications:   •  ketorolac (TORADOL) injection 30 mg, 30 mg, Intramuscular, Once, SAM CollinsA.-C.  ALLERGIES:   Allergies   Allergen Reactions   • Nkda [No Known Drug Allergy]      SURGHX:   Past Surgical History:   Procedure Laterality Date   • HIATAL HERNIA REPAIR  6/16/2011    Performed by RAMSES GALVEZ at SURGERY [facility]S   • LAPAROSCOPY  6/16/2011    Performed by RAMSES GALVEZ at [facility] ORS   • LAPAROSCOPY  4/4/2011    Lap band revision:Performed by RAMSES GALVEZ at [facility] ORS   • COLONOSCOPY  2008   • GASTRIC BANDING LAPAROSCOPIC  10/2007   • OTHER SURGICAL PROCEDURE  2003    uterine ablation   • TUBAL COAGULATION LAPAROSCOPIC BILATERAL  1995   • ENDOMETRIAL ABLATION       SOCHX:  reports that she has never smoked. She has never used smokeless tobacco. She reports previous alcohol use of about 1.2 oz of alcohol per week. She reports that she does not use drugs.  FH: Reviewed with patient, not pertinent to this visit.       Review of Systems   Eyes: Negative for blurred vision, double vision, photophobia, pain, discharge and redness.   Cardiovascular: Negative for chest pain.   Gastrointestinal: Negative for abdominal pain and bowel incontinence.  "  Genitourinary: Negative for bladder incontinence and pelvic pain.   Musculoskeletal: Positive for back pain and falls.   Neurological: Positive for headaches. Negative for dizziness, tingling, loss of consciousness, weakness, numbness and paresthesias.      Objective:   /88 (BP Location: Right arm, Patient Position: Sitting, BP Cuff Size: Large adult)   Pulse (!) 107   Temp 36.7 °C (98.1 °F) (Temporal)   Resp 16   Ht 1.626 m (5' 4\")   Wt 83.5 kg (184 lb)   SpO2 97%   BMI 31.58 kg/m²   Physical Exam  Vitals and nursing note reviewed.   Constitutional:       General: She is not in acute distress.     Appearance: Normal appearance. She is well-developed. She is not ill-appearing or toxic-appearing.   HENT:      Head: Normocephalic and atraumatic.      Right Ear: Hearing normal.      Left Ear: Hearing normal.   Eyes:      Extraocular Movements: Extraocular movements intact.      Conjunctiva/sclera: Conjunctivae normal.      Pupils: Pupils are equal, round, and reactive to light.   Cardiovascular:      Rate and Rhythm: Normal rate and regular rhythm.      Heart sounds: Normal heart sounds.   Pulmonary:      Effort: Pulmonary effort is normal.      Breath sounds: Normal breath sounds.   Musculoskeletal:      Cervical back: Normal range of motion. No spinous process tenderness or muscular tenderness.      Lumbar back: Spasms present.        Back:       Comments: Midline tenderness to the thoracic and lumbar area of the back.  No bruising or swelling to the area.  Patient has pain in the lumbar back with straight leg raise bilaterally. Full strength of upper and lower extremities. Normal gait.   Skin:     General: Skin is warm and dry.   Neurological:      General: No focal deficit present.      Mental Status: She is alert and oriented to person, place, and time.      Cranial Nerves: No cranial nerve deficit.      Motor: No weakness.      Coordination: Coordination normal.      Gait: Gait normal. "   Psychiatric:         Mood and Affect: Mood normal.     X-rays reviewed by myself and agree with read as follows.  DX LUMBAR  IMPRESSION:     Degenerative change affecting the disks and facet joints of the lower lumbar spine.     Persistent slight retrolisthesis at L3-4.     No evidence of fracture.     DX THORACIC  FINDINGS:  No thoracic spine fracture identified.     Normal thoracic kyphosis. No scoliosis.     No significant thoracic spondylosis.     Gastric band is in place, well positioned.     IMPRESSION:     No thoracic spondylosis. No fractures identified.    Tolerated Toradol shot in clinic today.  Assessment/Plan:   Assessment    1. Acute low back pain due to trauma  - DX-LUMBAR SPINE-2 OR 3 VIEWS; Future  - ketorolac (TORADOL) injection 30 mg    2. Acute midline thoracic back pain  - DX-THORACIC SPINE-WITH SWIMMERS VIEW; Future  - ketorolac (TORADOL) injection 30 mg  Extensively discussed with patient and her  [place] CT head scoring and given that she fell off of a horse this would put her in the medium risk category and I would recommend a CT scan for rule out.  She states she has had a concussion in the past and this does not feel anything like it and would like to wait on the CT scan and closely monitor her symptoms, she is understanding of the potential risks at this time.  She would like to have a Toradol shot today and will also send in muscle relaxers for her to use sparingly as prescribed mainly at night and not before driving or working.  Differential diagnosis, natural history, supportive care, and indications for immediate follow-up discussed.   Greater than 30 minutes were spent reviewing patient's chart, examining and obtaining history from patient, and discussing plan of care.   Heat, rest, gentle stretching.  Patient given instructions and understanding of medications and treatment.    If not improving in 3-5 days, F/U with PCP or return to  if symptoms worsen.    Patient  agreeable to plan.      Please note that this dictation was created using voice recognition software. I have made every reasonable attempt to correct obvious errors, but I expect that there are errors of grammar and possibly content that I did not discover before finalizing the note.    Thanh Orellana PA-C

## 2021-03-23 DIAGNOSIS — E78.5 HYPERLIPIDEMIA ASSOCIATED WITH TYPE 2 DIABETES MELLITUS (HCC): ICD-10-CM

## 2021-03-23 DIAGNOSIS — E11.69 HYPERLIPIDEMIA ASSOCIATED WITH TYPE 2 DIABETES MELLITUS (HCC): ICD-10-CM

## 2021-03-23 RX ORDER — ATORVASTATIN CALCIUM 20 MG/1
TABLET, FILM COATED ORAL
Qty: 90 TABLET | Refills: 1 | Status: SHIPPED | OUTPATIENT
Start: 2021-03-23 | End: 2021-08-30

## 2021-03-23 NOTE — TELEPHONE ENCOUNTER
Pt has had OV within the 12 month protocol and lipid panel is current. 6 month supply sent to pharmacy. Pt recently started on statin.  Lab Results   Component Value Date/Time    CHOLSTRLTOT 263 (H) 08/14/2020 08:03 AM     (H) 08/14/2020 08:03 AM    HDL 32 (A) 08/14/2020 08:03 AM    TRIGLYCERIDE 218 (H) 08/14/2020 08:03 AM       Lab Results   Component Value Date/Time    SODIUM 141 08/14/2020 08:03 AM    POTASSIUM 5.0 08/14/2020 08:03 AM    CHLORIDE 104 08/14/2020 08:03 AM    CO2 23 08/14/2020 08:03 AM    GLUCOSE 127 (H) 08/14/2020 08:03 AM    BUN 18 08/14/2020 08:03 AM    CREATININE 0.86 08/14/2020 08:03 AM     Lab Results   Component Value Date/Time    ALKPHOSPHAT 46 08/14/2020 08:03 AM    ASTSGOT 22 08/14/2020 08:03 AM    ALTSGPT 20 08/14/2020 08:03 AM    TBILIRUBIN 0.4 08/14/2020 08:03 AM

## 2021-06-03 DIAGNOSIS — E11.42 TYPE 2 DIABETES MELLITUS WITH DIABETIC POLYNEUROPATHY, WITHOUT LONG-TERM CURRENT USE OF INSULIN (HCC): ICD-10-CM

## 2021-06-03 RX ORDER — METFORMIN HYDROCHLORIDE EXTENDED-RELEASE TABLETS 1000 MG/1
1000 TABLET, FILM COATED, EXTENDED RELEASE ORAL
Qty: 90 TABLET | Refills: 1 | Status: SHIPPED | OUTPATIENT
Start: 2021-06-03

## 2021-07-05 DIAGNOSIS — I10 ESSENTIAL HYPERTENSION: ICD-10-CM

## 2021-07-08 RX ORDER — LOSARTAN POTASSIUM 50 MG/1
TABLET ORAL
Qty: 90 TABLET | Refills: 1 | Status: SHIPPED
Start: 2021-07-08

## 2021-10-15 ENCOUNTER — HOSPITAL ENCOUNTER (OUTPATIENT)
Facility: MEDICAL CENTER | Age: 56
End: 2021-10-15
Attending: NURSE PRACTITIONER
Payer: COMMERCIAL

## 2021-10-15 ENCOUNTER — OFFICE VISIT (OUTPATIENT)
Dept: URGENT CARE | Facility: PHYSICIAN GROUP | Age: 56
End: 2021-10-15
Payer: COMMERCIAL

## 2021-10-15 VITALS
RESPIRATION RATE: 18 BRPM | SYSTOLIC BLOOD PRESSURE: 138 MMHG | HEIGHT: 64 IN | BODY MASS INDEX: 34.15 KG/M2 | TEMPERATURE: 98 F | HEART RATE: 100 BPM | OXYGEN SATURATION: 98 % | WEIGHT: 200 LBS | DIASTOLIC BLOOD PRESSURE: 88 MMHG

## 2021-10-15 DIAGNOSIS — M79.674 PAIN OF TOE OF RIGHT FOOT: ICD-10-CM

## 2021-10-15 DIAGNOSIS — R30.0 DYSURIA: ICD-10-CM

## 2021-10-15 DIAGNOSIS — R31.0 GROSS HEMATURIA: ICD-10-CM

## 2021-10-15 DIAGNOSIS — N30.01 ACUTE CYSTITIS WITH HEMATURIA: ICD-10-CM

## 2021-10-15 LAB
APPEARANCE UR: NORMAL
BILIRUB UR STRIP-MCNC: NEGATIVE MG/DL
COLOR UR AUTO: YELLOW
GLUCOSE UR STRIP.AUTO-MCNC: NEGATIVE MG/DL
KETONES UR STRIP.AUTO-MCNC: NEGATIVE MG/DL
LEUKOCYTE ESTERASE UR QL STRIP.AUTO: NORMAL
NITRITE UR QL STRIP.AUTO: NEGATIVE
PH UR STRIP.AUTO: 5.5 [PH] (ref 5–8)
PROT UR QL STRIP: NEGATIVE MG/DL
RBC UR QL AUTO: NORMAL
SP GR UR STRIP.AUTO: 1.02
UROBILINOGEN UR STRIP-MCNC: 0.2 MG/DL

## 2021-10-15 PROCEDURE — 87186 SC STD MICRODIL/AGAR DIL: CPT

## 2021-10-15 PROCEDURE — 87077 CULTURE AEROBIC IDENTIFY: CPT

## 2021-10-15 PROCEDURE — 99214 OFFICE O/P EST MOD 30 MIN: CPT | Performed by: NURSE PRACTITIONER

## 2021-10-15 PROCEDURE — 81002 URINALYSIS NONAUTO W/O SCOPE: CPT | Performed by: NURSE PRACTITIONER

## 2021-10-15 PROCEDURE — 87086 URINE CULTURE/COLONY COUNT: CPT

## 2021-10-15 RX ORDER — NAPROXEN 500 MG/1
500 TABLET ORAL 2 TIMES DAILY WITH MEALS
Qty: 60 TABLET | Refills: 0 | Status: SHIPPED | OUTPATIENT
Start: 2021-10-15

## 2021-10-15 RX ORDER — AMITRIPTYLINE HYDROCHLORIDE 10 MG/1
1 TABLET, FILM COATED ORAL DAILY
COMMUNITY
Start: 2021-09-26

## 2021-10-15 RX ORDER — NITROFURANTOIN 25; 75 MG/1; MG/1
100 CAPSULE ORAL EVERY 12 HOURS
Qty: 10 CAPSULE | Refills: 0 | Status: SHIPPED | OUTPATIENT
Start: 2021-10-15 | End: 2021-10-20

## 2021-10-15 ASSESSMENT — ENCOUNTER SYMPTOMS
MUSCULOSKELETAL NEGATIVE: 1
NEUROLOGICAL NEGATIVE: 1
GASTROINTESTINAL NEGATIVE: 1
CARDIOVASCULAR NEGATIVE: 1
EYES NEGATIVE: 1
CONSTITUTIONAL NEGATIVE: 1
RESPIRATORY NEGATIVE: 1
PSYCHIATRIC NEGATIVE: 1

## 2021-10-15 NOTE — PROGRESS NOTES
Subjective:   Thierry Dinh is a 56 y.o. female who presents for Blood in Urine (low back apin and blood in urine. started this morning. )       Dysuria   This is a new problem. The current episode started yesterday. The problem occurs every urination. The problem has been unchanged. The quality of the pain is described as aching and burning. The pain is mild. Associated symptoms include hematuria, hesitancy and urgency. She has tried nothing for the symptoms.   Nail Problem  This is a new problem. The current episode started in the past 7 days. The problem occurs constantly. The problem has been unchanged. Associated symptoms comments: none . The symptoms are aggravated by walking and standing (palpation). She has tried nothing for the symptoms.         Review of Systems   Constitutional: Negative.    HENT: Negative.    Eyes: Negative.    Respiratory: Negative.    Cardiovascular: Negative.    Gastrointestinal: Negative.    Genitourinary: Positive for dysuria, hematuria, hesitancy and urgency.   Musculoskeletal: Negative.    Skin: Negative.    Neurological: Negative.    Psychiatric/Behavioral: Negative.    All other systems reviewed and are negative.      MEDS:   Current Outpatient Medications:   •  atorvastatin (LIPITOR) 20 MG Tab, Take 1 tablet by mouth once daily, Disp: 90 Tablet, Rfl: 0  •  montelukast (SINGULAIR) 10 MG Tab, Take 1 tablet by mouth once daily, Disp: 90 Tablet, Rfl: 0  •  losartan (COZAAR) 50 MG Tab, Take 1 tablet by mouth once daily, Disp: 90 tablet, Rfl: 1  •  metFORMIN ER 1000 MG TABLET SR 24 HR, Take 1 tablet by mouth with dinner., Disp: 90 tablet, Rfl: 1  •  venlafaxine (EFFEXOR) 75 MG Tab, Take 1 tablet by mouth twice daily with food, Disp: 180 Tab, Rfl: 1  •  albuterol 108 (90 Base) MCG/ACT Aero Soln inhalation aerosol, Inhale 2 Puffs by mouth every 6 hours as needed for Shortness of Breath., Disp: 8.5 g, Rfl: 4  •  naproxen (NAPROSYN) 500 MG Tab, Take 1 Tab by mouth 2 times a day,  "with meals., Disp: 90 Tab, Rfl: 1  •  ipratropium-albuterol (DUONEB) 0.5-2.5 (3) MG/3ML nebulizer solution, 3 mL by Nebulization route 4 times a day., Disp: 30 Bullet, Rfl: 2  •  Misc. Devices Kit, Glucometer (brand as accepted by insurance). To test fasting in the AM, Disp: 1 Kit, Rfl: 0  •  Lancet Device Misc, MATCH TO CURRENT GLUCOMETER, To test fasting in the AM, Disp: 100 Each, Rfl: 4  •  Blood Glucose Monitoring Suppl SUPPLIES Misc, Test Strips (brand as accepted by insurance)Sig: Use AM fasting., Disp: 100 Each, Rfl: 4  ALLERGIES:   Allergies   Allergen Reactions   • Nkda [No Known Drug Allergy]        Patient's PMH, SocHx, SurgHx, FamHx, Drug allergies and medications were reviewed.     Objective:   /88   Pulse 100   Temp 36.7 °C (98 °F) (Temporal)   Resp 18   Ht 1.626 m (5' 4\")   Wt 90.7 kg (200 lb)   SpO2 98%   BMI 34.33 kg/m²     Physical Exam  Vitals and nursing note reviewed.   Constitutional:       General: She is awake.      Appearance: Normal appearance. She is well-developed.   HENT:      Head: Normocephalic and atraumatic.      Right Ear: External ear normal.      Left Ear: External ear normal.      Nose: Nose normal.      Mouth/Throat:      Mouth: Mucous membranes are moist.      Pharynx: Oropharynx is clear.   Eyes:      Extraocular Movements: Extraocular movements intact.      Conjunctiva/sclera: Conjunctivae normal.   Cardiovascular:      Rate and Rhythm: Normal rate and regular rhythm.      Heart sounds: Normal heart sounds.   Pulmonary:      Effort: Pulmonary effort is normal.      Breath sounds: Normal breath sounds.   Abdominal:      General: Bowel sounds are normal.      Palpations: Abdomen is soft.      Tenderness: There is abdominal tenderness in the suprapubic area. There is no right CVA tenderness or left CVA tenderness.   Musculoskeletal:         General: Normal range of motion.      Cervical back: Normal range of motion and neck supple.   Skin:     General: Skin is warm " and dry.   Neurological:      General: No focal deficit present.      Mental Status: She is alert and oriented to person, place, and time.   Psychiatric:         Mood and Affect: Mood normal.         Behavior: Behavior normal. Behavior is cooperative.         Thought Content: Thought content normal.         Judgment: Judgment normal.         Assessment/Plan:   Assessment    1. Acute cystitis with hematuria  - Urine Culture; Future  - POCT Urinalysis  - nitrofurantoin (MACROBID) 100 MG Cap; Take 1 Capsule by mouth every 12 hours for 5 days.  Dispense: 10 Capsule; Refill: 0  - naproxen (NAPROSYN) 500 MG Tab; Take 1 Tablet by mouth 2 times a day with meals.  Dispense: 60 Tablet; Refill: 0    2. Gross hematuria    3. Dysuria  - Urine Culture; Future  - POCT Urinalysis  - nitrofurantoin (MACROBID) 100 MG Cap; Take 1 Capsule by mouth every 12 hours for 5 days.  Dispense: 10 Capsule; Refill: 0    4. Pain of toe of right foot  - naproxen (NAPROSYN) 500 MG Tab; Take 1 Tablet by mouth 2 times a day with meals.  Dispense: 60 Tablet; Refill: 0    Vital signs stable at today's acute urgent care visit.  Reviewed test results that were completed in the clinic.  Will send urine for culture.  Recommend to push fluids as well as cranberry tablets.  Patient is concerned with possibly having gone following her toe, she declines lab work at this time.  Begin naproxen as listed for toe pain.  Begin medications as listed. Discussed management options (risks, benefits, and alternatives to treatment).     Advised the patient to follow-up with the primary care provider for recheck, reevaluation, and/or consideration of further management if necessary. Return to urgent care with any worsening symptoms or if there is no improvement in their current condition. Red flags discussed and indications to immediately call 911 or present to the ED.  All questions were encouraged and answered to the patient's satisfaction and understanding, and they  agree to the plan of care.     I personally reviewed prior external notes and test results pertinent to today's visit.  I have independently reviewed and interpreted all diagnostics ordered during this urgent care acute visit. Time spent evaluating this patient was a minimum of 30 minutes and includes preparing for visit, counseling/education, exam, evaluation, obtaining history, and ordering lab/test/procedures.      Please note that this dictation was created using voice recognition software. I have made a reasonable attempt to correct obvious errors, but I expect that there are errors of grammar and possibly content that I did not discover before finalizing the note.

## 2021-10-17 LAB
BACTERIA UR CULT: ABNORMAL
BACTERIA UR CULT: ABNORMAL
SIGNIFICANT IND 70042: ABNORMAL
SITE SITE: ABNORMAL
SOURCE SOURCE: ABNORMAL

## 2021-12-20 ENCOUNTER — HOSPITAL ENCOUNTER (OUTPATIENT)
Facility: MEDICAL CENTER | Age: 56
End: 2021-12-20
Attending: NURSE PRACTITIONER
Payer: COMMERCIAL

## 2021-12-20 ENCOUNTER — OFFICE VISIT (OUTPATIENT)
Dept: URGENT CARE | Facility: PHYSICIAN GROUP | Age: 56
End: 2021-12-20
Payer: COMMERCIAL

## 2021-12-20 VITALS
HEART RATE: 115 BPM | TEMPERATURE: 97.7 F | DIASTOLIC BLOOD PRESSURE: 98 MMHG | WEIGHT: 205.2 LBS | BODY MASS INDEX: 35.03 KG/M2 | HEIGHT: 64 IN | RESPIRATION RATE: 16 BRPM | OXYGEN SATURATION: 96 % | SYSTOLIC BLOOD PRESSURE: 170 MMHG

## 2021-12-20 DIAGNOSIS — J00 ACUTE RHINITIS: ICD-10-CM

## 2021-12-20 DIAGNOSIS — R09.81 NASAL CONGESTION: ICD-10-CM

## 2021-12-20 DIAGNOSIS — Z78.9 RECENT TRAVEL ON CRUISE SHIP: ICD-10-CM

## 2021-12-20 DIAGNOSIS — H92.03 OTALGIA OF BOTH EARS: ICD-10-CM

## 2021-12-20 DIAGNOSIS — R51.9 GENERALIZED HEADACHE: ICD-10-CM

## 2021-12-20 DIAGNOSIS — I10 ELEVATED BLOOD PRESSURE READING IN OFFICE WITH DIAGNOSIS OF HYPERTENSION: ICD-10-CM

## 2021-12-20 DIAGNOSIS — J06.9 UPPER RESPIRATORY INFECTION, ACUTE: Primary | ICD-10-CM

## 2021-12-20 DIAGNOSIS — R05.9 COUGH: ICD-10-CM

## 2021-12-20 LAB
FLUAV+FLUBV AG SPEC QL IA: NEGATIVE
INT CON NEG: NORMAL
INT CON POS: NORMAL

## 2021-12-20 PROCEDURE — U0005 INFEC AGEN DETEC AMPLI PROBE: HCPCS

## 2021-12-20 PROCEDURE — 99214 OFFICE O/P EST MOD 30 MIN: CPT | Performed by: NURSE PRACTITIONER

## 2021-12-20 PROCEDURE — U0003 INFECTIOUS AGENT DETECTION BY NUCLEIC ACID (DNA OR RNA); SEVERE ACUTE RESPIRATORY SYNDROME CORONAVIRUS 2 (SARS-COV-2) (CORONAVIRUS DISEASE [COVID-19]), AMPLIFIED PROBE TECHNIQUE, MAKING USE OF HIGH THROUGHPUT TECHNOLOGIES AS DESCRIBED BY CMS-2020-01-R: HCPCS

## 2021-12-20 PROCEDURE — 87804 INFLUENZA ASSAY W/OPTIC: CPT | Performed by: NURSE PRACTITIONER

## 2021-12-20 ASSESSMENT — ENCOUNTER SYMPTOMS
CHILLS: 0
FEVER: 0
NAUSEA: 0
HEADACHES: 1
DIZZINESS: 0
SORE THROAT: 0
COUGH: 1

## 2021-12-20 ASSESSMENT — LIFESTYLE VARIABLES: SUBSTANCE_ABUSE: 0

## 2021-12-20 NOTE — PROGRESS NOTES
"Thierry Dinh is a 56 y.o. female who presents for Cough (SOB, runny nose, congestion, headache, x1 day )      HPI this new problem.  Justina is a 56-year-old female presents with sudden onset of cough, mild shortness of breath, runny nose, congestion and a headache for 1 day.  She just returned from a [place] cruise ship  vacation.  Since she got back into [place] in [facility] she started having symptoms of runny nose.  She thought at first it might be just her allergies that are \"acting up.  She has been using pseudoephedrine, antihistamines (benadryl)  and some cold medicines to alleviate her discomfort.  Symptoms have mildly improved.  Associated symptoms include bilateral ear pain, headache, cough.  No other aggravating or alleviating factors.  No exposures to persons with Covid or influenza.    Review of Systems   Constitutional: Positive for malaise/fatigue. Negative for chills and fever.   HENT: Positive for congestion. Negative for sore throat.    Respiratory: Positive for cough.    Gastrointestinal: Negative for nausea.   Neurological: Positive for headaches. Negative for dizziness.   Endo/Heme/Allergies: Negative for environmental allergies.   Psychiatric/Behavioral: Negative for substance abuse.       No Known Allergies  Past Medical History:   Diagnosis Date   • Anxiety    • Chickenpox    • Prydeinig measles    • Heart burn    • Hiatus hernia syndrome    • Hyperlipidemia    • Hypertension    • Influenza    • Left hip pain 2/16/2018   • Mumps    • Pain     stomach   • Psychiatric problem     depression   • Sleep apnea    • Tachycardia 5/31/2018   • Tonsillitis      Past Surgical History:   Procedure Laterality Date   • HIATAL HERNIA REPAIR  6/16/2011    Performed by RAMSES GALVEZ at SURGERY [facility] ORS   • LAPAROSCOPY  6/16/2011    Performed by RAMSES GALVEZ at [facility] ORS   • LAPAROSCOPY  4/4/2011    Lap band revision:Performed by RAMSES GALVEZ at SURGERY [facility] ORS   • " COLONOSCOPY  2008   • GASTRIC BANDING LAPAROSCOPIC  10/2007   • OTHER SURGICAL PROCEDURE  2003    uterine ablation   • TUBAL COAGULATION LAPAROSCOPIC BILATERAL  1995   • ENDOMETRIAL ABLATION       Family History   Problem Relation Age of Onset   • Hypertension Mother    • Diabetes Mother    • Stroke Mother    • Heart Disease Mother    • Genetic Disorder Father         Parkinsons   • Hypertension Father    • Hyperlipidemia Father    • Hypertension Sister    • Diabetes Maternal Uncle    • Diabetes Paternal Aunt    • Diabetes Paternal Uncle    • Genetic Disorder Maternal Grandmother         kidney failure   • Arthritis Maternal Grandfather    • Heart Disease Maternal Grandfather    • Stroke Other    • Sleep Apnea Neg Hx      Social History     Tobacco Use   • Smoking status: Never Smoker   • Smokeless tobacco: Never Used   • Tobacco comment: avoid all tobacco products   Substance Use Topics   • Alcohol use: Yes     Alcohol/week: 1.2 oz     Types: 2 Shots of liquor per week     Comment: occ      Patient Active Problem List   Diagnosis   • Recurrent major depressive disorder, in remission (HCC)   • Allergic rhinitis   • Hyperlipidemia associated with type 2 diabetes mellitus (HCC)   • Essential hypertension   • Obstructive sleep apnea   • Insomnia   • Type 2 diabetes mellitus with diabetic polyneuropathy, without long-term current use of insulin (HCC)   • BMI 33.0-33.9,adult   • Mixed stress and urge urinary incontinence   • Exercise-induced asthma   • Obesity (BMI 30-39.9)   • Panic attacks   • Vitamin D deficiency   • History of adjustable gastric banding   • Bilateral cold feet   • Diabetic peripheral neuropathy associated with type 2 diabetes mellitus (HCC)     Current Outpatient Medications on File Prior to Visit   Medication Sig Dispense Refill   • naproxen (NAPROSYN) 500 MG Tab Take 1 Tablet by mouth 2 times a day with meals. 60 Tablet 0   • amitriptyline (ELAVIL) 10 MG Tab Take 1 Tablet by mouth every day.     •  "atorvastatin (LIPITOR) 20 MG Tab Take 1 tablet by mouth once daily 90 Tablet 0   • montelukast (SINGULAIR) 10 MG Tab Take 1 tablet by mouth once daily 90 Tablet 0   • losartan (COZAAR) 50 MG Tab Take 1 tablet by mouth once daily 90 tablet 1   • metFORMIN ER 1000 MG TABLET SR 24 HR Take 1 tablet by mouth with dinner. 90 tablet 1   • venlafaxine (EFFEXOR) 75 MG Tab Take 1 tablet by mouth twice daily with food 180 Tab 1   • albuterol 108 (90 Base) MCG/ACT Aero Soln inhalation aerosol Inhale 2 Puffs by mouth every 6 hours as needed for Shortness of Breath. 8.5 g 4   • ipratropium-albuterol (DUONEB) 0.5-2.5 (3) MG/3ML nebulizer solution 3 mL by Nebulization route 4 times a day. 30 Bullet 2   • Misc. Devices Kit Glucometer (brand as accepted by insurance). To test fasting in the AM 1 Kit 0   • Lancet Device Misc MATCH TO CURRENT GLUCOMETER, To test fasting in the  Each 4   • Blood Glucose Monitoring Suppl SUPPLIES Misc Test Strips (brand as accepted by insurance)Sig: Use AM fasting. 100 Each 4     No current facility-administered medications on file prior to visit.          Objective:     BP (!) 170/98 (BP Location: Right arm, Patient Position: Sitting, BP Cuff Size: Adult)   Pulse (!) 115   Temp 36.5 °C (97.7 °F) (Temporal)   Resp 16   Ht 1.626 m (5' 4\")   Wt 93.1 kg (205 lb 3.2 oz)   SpO2 96%   BMI 35.22 kg/m²     Physical Exam  Vitals and nursing note reviewed.   Constitutional:       General: She is not in acute distress.     Appearance: Normal appearance. She is well-developed. She is not ill-appearing or toxic-appearing.   HENT:      Head: Normocephalic.      Right Ear: Hearing, ear canal and external ear normal. No middle ear effusion. Tympanic membrane is injected. Tympanic membrane is not erythematous.      Left Ear: Hearing, ear canal and external ear normal.  No middle ear effusion. Tympanic membrane is injected. Tympanic membrane is not erythematous.      Nose: Rhinorrhea present. No mucosal " edema.      Right Sinus: No maxillary sinus tenderness or frontal sinus tenderness.      Left Sinus: No maxillary sinus tenderness or frontal sinus tenderness.      Mouth/Throat:      Pharynx: Uvula midline. Posterior oropharyngeal erythema present. No oropharyngeal exudate.      Tonsils: No tonsillar abscesses.   Eyes:      Pupils: Pupils are equal, round, and reactive to light.   Neck:      Trachea: Trachea normal.   Cardiovascular:      Rate and Rhythm: Normal rate and regular rhythm.      Chest Wall: PMI is not displaced.   Pulmonary:      Effort: Pulmonary effort is normal. No respiratory distress.      Breath sounds: Normal breath sounds. No decreased breath sounds, wheezing, rhonchi or rales.   Chest:   Breasts:      Right: No supraclavicular adenopathy.      Left: No supraclavicular adenopathy.       Abdominal:      Palpations: Abdomen is soft.      Tenderness: There is no abdominal tenderness.   Musculoskeletal:         General: Normal range of motion.      Cervical back: Full passive range of motion without pain, normal range of motion and neck supple.   Lymphadenopathy:      Cervical: No cervical adenopathy.      Upper Body:      Right upper body: No supraclavicular adenopathy.      Left upper body: No supraclavicular adenopathy.   Skin:     General: Skin is warm and dry.   Neurological:      Mental Status: She is alert and oriented to person, place, and time.      Gait: Gait normal.   Psychiatric:         Speech: Speech normal.         Behavior: Behavior normal.         Assessment /Associated Orders:      1. Upper respiratory infection, acute     2. Cough  POCT Influenza A/B    SARS-CoV-2 PCR (24 hour In-House): Collect NP swab in VTM    CANCELED: SARS-CoV-2 PCR (24 hour In-House): Collect NP swab in VTM   3. Nasal congestion  POCT Influenza A/B    SARS-CoV-2 PCR (24 hour In-House): Collect NP swab in VTM    CANCELED: SARS-CoV-2 PCR (24 hour In-House): Collect NP swab in VTM   4. Recent travel on cruise  ship  POCT Influenza A/B    SARS-CoV-2 PCR (24 hour In-House): Collect NP swab in VTM    CANCELED: SARS-CoV-2 PCR (24 hour In-House): Collect NP swab in VTM   5. Otalgia of both ears     6. Acute rhinitis     7. Generalized headache     8. Elevated blood pressure reading in office with diagnosis of hypertension           Medical Decision Making:    Pt is clinically stable at today's acute urgent care visit.  No acute distress noted. Appropriate for outpatient management at this time.     COVID PCR testing - pending- Will have test done tomorrow evening since her sx just started yesterday -  (results to be [facility] to [facility] if available)   Quarantine per CDC guidelines   Educated in infection control practices.     OTC  analgesic/ antipyretic of choice ( acetaminophen or NSAID). Follow manufactures dosing and safety precautions.   OTC medications for symptomatic relief of symptoms'  Discontinue any medications with decongestants. Avoid benadryl due to elevated blood pressure. Use high blood pressure cough medication if needed.     Humidifier at night prn could be helpful to reduce sx.   Keep well hydrated  Increase rest  Discussed that this illness was  most likely viral in nature. Did not see any evidence of a bacterial process. OTC medications can be used for symptomatic relief of symptoms. Follow manufacturers guidelines for dosing and instructions.     Referred to his primary care provider for monitoring and management. Educated in end organ effects of uncontrolled BP  including MI, CVA, Blindness, CRF and death. Educated in TLC's.     Advised to follow-up with the primary care provider  for recheck, reevaluation, and consideration of further management if necessary.   Discussed management options (risks,benefits, and alternatives to treatment). Pt/ caregiver expressed understanding and the treatment plan was agreed upon. Questions were encouraged and answered     Return to urgent care prn if new or worsening  sx or if there is no improvement in condition prn . Red flags discussed and indications to immediately call 911 or present to the Emergency Department.     I personally reviewed prior external notes and test results pertinent to today's visit.  I have independently reviewed and interpreted all diagnostics ordered during this urgent care acute visit.   Time spent evaluating this patient was at least 30 minutes and includes preparing for visit, counseling/education, exam and evaluation, obtaining history, independent interpretation, ordering lab/test/procedures,medication management and documentation.This does not include time spent on separate billable procedures.           Please note that this dictation was created using voice recognition software. I have made a reasonable attempt to correct obvious errors, but I expect that there are errors of grammar and possibly content that I did not discover before finalizing the note.    This note was electronically signed by Sherrill UMANA, Urgent Care

## 2021-12-22 DIAGNOSIS — R05.9 COUGH: ICD-10-CM

## 2021-12-22 DIAGNOSIS — R09.81 NASAL CONGESTION: ICD-10-CM

## 2021-12-22 DIAGNOSIS — Z78.9 RECENT TRAVEL ON CRUISE SHIP: ICD-10-CM

## 2021-12-22 LAB
AMBIGUOUS DTTM AMBI4: NORMAL
COVID ORDER STATUS COVID19: NORMAL
SARS-COV-2 RNA RESP QL NAA+PROBE: NOTDETECTED
SPECIMEN SOURCE: NORMAL

## 2022-02-09 ENCOUNTER — HOSPITAL ENCOUNTER (EMERGENCY)
Facility: MEDICAL CENTER | Age: 57
End: 2022-02-09
Attending: EMERGENCY MEDICINE
Payer: OTHER GOVERNMENT

## 2022-02-09 ENCOUNTER — APPOINTMENT (OUTPATIENT)
Dept: RADIOLOGY | Facility: MEDICAL CENTER | Age: 57
End: 2022-02-09
Attending: EMERGENCY MEDICINE
Payer: OTHER GOVERNMENT

## 2022-02-09 VITALS
HEIGHT: 64 IN | OXYGEN SATURATION: 94 % | BODY MASS INDEX: 35.34 KG/M2 | WEIGHT: 207.01 LBS | SYSTOLIC BLOOD PRESSURE: 127 MMHG | TEMPERATURE: 97 F | HEART RATE: 99 BPM | RESPIRATION RATE: 18 BRPM | DIASTOLIC BLOOD PRESSURE: 74 MMHG

## 2022-02-09 DIAGNOSIS — N39.42 URINARY INCONTINENCE WITHOUT SENSORY AWARENESS: ICD-10-CM

## 2022-02-09 PROCEDURE — 72148 MRI LUMBAR SPINE W/O DYE: CPT

## 2022-02-09 PROCEDURE — A9270 NON-COVERED ITEM OR SERVICE: HCPCS | Performed by: EMERGENCY MEDICINE

## 2022-02-09 PROCEDURE — 700102 HCHG RX REV CODE 250 W/ 637 OVERRIDE(OP): Performed by: EMERGENCY MEDICINE

## 2022-02-09 PROCEDURE — 99283 EMERGENCY DEPT VISIT LOW MDM: CPT

## 2022-02-09 RX ORDER — ACETAMINOPHEN 325 MG/1
650 TABLET ORAL ONCE
Status: COMPLETED | OUTPATIENT
Start: 2022-02-09 | End: 2022-02-09

## 2022-02-09 RX ADMIN — ACETAMINOPHEN 650 MG: 325 TABLET, FILM COATED ORAL at 15:43

## 2022-02-09 ASSESSMENT — LIFESTYLE VARIABLES: DO YOU DRINK ALCOHOL: NO

## 2022-02-09 NOTE — ED TRIAGE NOTES
"Chief Complaint   Patient presents with   • Low Back Pain     Pt states she injured her back cleaning the toilet last week. Pt states she can't feel her bladder since the injury and was sent by the [facility] for an MRI. Pt states she's incontinent of urine. Pt reports weakness in both her lower extremities.      /93   Pulse (!) 110   Temp 36.1 °C (97 °F) (Temporal)   Resp 16   Ht 1.626 m (5' 4\")   Wt 93.9 kg (207 lb 0.2 oz)   SpO2 96%   BMI 35.53 kg/m²     Pt ambulatory to triage for above, states the [facility] will be faxing a referral for an MRI.   "

## 2022-02-09 NOTE — ED PROVIDER NOTES
"ED Provider Note    CHIEF COMPLAINT  Chief Complaint   Patient presents with   • Low Back Pain     Pt states she injured her back cleaning the toilet last week. Pt states she can't feel her bladder since the injury and was sent by the [facility] for an MRI. Pt states she's incontinent of urine. Pt reports weakness in both her lower extremities.        HPI  Thierry Dinh is a 56 y.o. female who presents here for MRI to rule out cauda equina syndrome.  She is sent here by [facility].  She is tells me that she was told to \"come right here\" to \"get an emergent MRI\" to rule out cauda equina syndrome.    Her complaint is really urinary incontinence.  Is been going on for about 7 days.  She describes it as standing up and feel like she needs to urinate and then some of it goes into her pants and she wets her pants.  This is caused her to go to the bathroom before she goes to bed this also caused her to urinate every few hours to make sure does not happen.  So is hard to determine if this is getting worse over the patient has manage it but apparently it is possibly going on and still happening or at least the patient is concerned and is prepping for it.    She had back pain 7 days ago she was standing up and turning as she was getting off the toilet.  So she was cleaning it.  They are sending her home.  She is gone for deep tissue massage manipulation which appears to be vigorous at times conservative therapy no pain medicine.  Is a bilateral pain down her legs.  No numbness or weakness.    REVIEW OF SYSTEMS  General: No fever chills  See all above.    PAST MEDICAL HISTORY  Past Medical History:   Diagnosis Date   • Tachycardia 5/31/2018   • Left hip pain 2/16/2018   • Anxiety    • Chickenpox    • Burmese measles    • Heart burn    • Hiatus hernia syndrome    • Hyperlipidemia    • Hypertension    • Influenza    • Mumps    • Pain     stomach   • Psychiatric problem     depression   • Sleep apnea    • Tonsillitis  "       FAMILY HISTORY  Family History   Problem Relation Age of Onset   • Hypertension Mother    • Diabetes Mother    • Stroke Mother    • Heart Disease Mother    • Genetic Disorder Father         Parkinsons   • Hypertension Father    • Hyperlipidemia Father    • Hypertension Sister    • Diabetes Maternal Uncle    • Diabetes Paternal Aunt    • Diabetes Paternal Uncle    • Genetic Disorder Maternal Grandmother         kidney failure   • Arthritis Maternal Grandfather    • Heart Disease Maternal Grandfather    • Stroke Other    • Sleep Apnea Neg Hx        SOCIAL HISTORY  Social History     Socioeconomic History   • Marital status:      Spouse name: Not on file   • Number of children: Not on file   • Years of education: Not on file   • Highest education level: Not on file   Occupational History   • Not on file   Tobacco Use   • Smoking status: Never Smoker   • Smokeless tobacco: Never Used   • Tobacco comment: avoid all tobacco products   Vaping Use   • Vaping Use: Never used   Substance and Sexual Activity   • Alcohol use: Yes     Alcohol/week: 1.2 oz     Types: 2 Shots of liquor per week     Comment: occ    • Drug use: No   • Sexual activity: Yes     Partners: Male   Other Topics Concern   • Not on file   Social History Narrative   • Not on file     Social Determinants of Health     Financial Resource Strain:    • Difficulty of Paying Living Expenses: Not on file   Food Insecurity:    • Worried About Running Out of Food in the Last Year: Not on file   • Ran Out of Food in the Last Year: Not on file   Transportation Needs:    • Lack of Transportation (Medical): Not on file   • Lack of Transportation (Non-Medical): Not on file   Physical Activity:    • Days of Exercise per Week: Not on file   • Minutes of Exercise per Session: Not on file   Stress:    • Feeling of Stress : Not on file   Social Connections:    • Frequency of Communication with Friends and Family: Not on file   • Frequency of Social Gatherings with  Friends and Family: Not on file   • Attends [employer] Services: Not on file   • Active Member of Clubs or Organizations: Not on file   • Attends Club or Organization Meetings: Not on file   • Marital Status: Not on file   Intimate Partner Violence:    • Fear of Current or Ex-Partner: Not on file   • Emotionally Abused: Not on file   • Physically Abused: Not on file   • Sexually Abused: Not on file   Housing Stability:    • Unable to Pay for Housing in the Last Year: Not on file   • Number of Places Lived in the Last Year: Not on file   • Unstable Housing in the Last Year: Not on file       SURGICAL HISTORY  Past Surgical History:   Procedure Laterality Date   • HIATAL HERNIA REPAIR  6/16/2011    Performed by RAMSES GALVEZ at SURGERY [facility] ORS   • LAPAROSCOPY  6/16/2011    Performed by RAMSES GALVEZ at SURGERY [facility] ORS   • LAPAROSCOPY  4/4/2011    Lap band revision:Performed by RAMSES GAL[facility]Z at [facility] OR[facility]   • COLONOSCOPY  2008   • GASTRIC BANDING LAPAROSCOPIC  10/2007   • OTHER SURGICAL PROCEDURE  2003    uterine ablation   • TUBAL COAGULATION LAPAROSCOPIC BILATERAL  1995   • ENDOMETRIAL ABLATION         CURRENT MEDICATIONS  Home Medications     Reviewed by Macey Landin R.N. (Registered Nurse) on 02/09/22 at 1009  Med List Status: Partial   Medication Last Dose Status   albuterol 108 (90 Base) MCG/ACT Aero Soln inhalation aerosol  Active   amitriptyline (ELAVIL) 10 MG Tab  Active   atorvastatin (LIPITOR) 20 MG Tab  Active   Blood Glucose Monitoring Suppl SUPPLIES Misc  Active   ipratropium-albuterol (DUONEB) 0.5-2.5 (3) MG/3ML nebulizer solution  Active   Lancet Device Misc  Active   losartan (COZAAR) 50 MG Tab  Active   metFORMIN ER 1000 MG TABLET SR 24 HR  Active   Misc. Devices Kit  Active   montelukast (SINGULAIR) 10 MG Tab  Active   naproxen (NAPROSYN) 500 MG Tab  Active   venlafaxine (EFFEXOR) 75 MG Tab  Active                ALLERGIES  No Known Allergies    PHYSICAL  "EXAM  VITAL SIGNS: /82   Pulse (!) 108   Temp 36.1 °C (97 °F) (Temporal)   Resp 16   Ht 1.626 m (5' 4\")   Wt 93.9 kg (207 lb 0.2 oz)   SpO2 95%   BMI 35.53 kg/m²        Constitutional: Well developed, Well nourished, No acute distress, Non-toxic appearance. .   Cardiovascular: Good pedal pulses bilateral legs are warm bilaterally  Thorax & Lungs: No respiratory distress no stridor  Skin: Warm, Dry, No erythema, No rash.   Extremities: Intact distal pulses, No edema, No tenderness, No cyanosis, No clubbing.   Neurologic: 5 out of 5 strength of the foot knee ankle ankle and hip.  Flexion extension she is got good reflexes brisk 2 bilaterally she has no saddle paresthesia.      RADIOLOGY/PROCEDURES  MR-LUMBAR SPINE-W/O    (Results Pending)       Ophelia as per radiologist was that there is some disc bulging at L5 but nothing suggestive of cauda equina syndrome abscess or mass    COURSE & MEDICAL DECISION MAKING  Pertinent Labs & Imaging studies reviewed. (See chart for details)  Patient here to rule out cauda equina syndrome with some urinary incontinence after a fall which by the history is interesting is that this happened about 1 to 2 days after the injury and it is hard to discern by history if she continues having it or she is prepping for it by urinating frequently.  Regardless she is sent here I did review the note from the physician associate stating that the patient elected go to the ER but according to the patient she is tell me that she was told of ER get an emergent MRI.  Regardless we will have him rule it out.  Patient will get some Tylenol for pain she understands may take some time    5:55 PM  Warned the patient of all the results.  I did.  The patient has been set up by Dr. MOREL for follow-up.  Urged her to follow-up with orthopedics for further work-up.  Come back if she has increasing pain more worsening of urinary function or weakness in her legs.    Is a very pleasant 56-year-old female " with a new onset of urinary incontinence after back injury.  Further work-up can be delineated as an outpatient at this time    FINAL IMPRESSION  1.  Urinary incontinence  2.  Back pain  3.      Electronically signed by: Wilian Jeter M.D., 2/9/2022 2:58 PM

## 2022-02-10 NOTE — ED NOTES
Discharge instructions given.  All questions answered.  Pt to follow-up with Orthopedic Surgery, return to ER if symptoms worsen as discussed.  Pt verbalized understanding.  All belongings with pt.  Pt ambulated to lobby.

## 2022-06-01 ENCOUNTER — OFFICE VISIT (OUTPATIENT)
Dept: URGENT CARE | Facility: PHYSICIAN GROUP | Age: 57
End: 2022-06-01
Payer: COMMERCIAL

## 2022-06-01 VITALS
SYSTOLIC BLOOD PRESSURE: 140 MMHG | HEIGHT: 65 IN | RESPIRATION RATE: 16 BRPM | HEART RATE: 96 BPM | DIASTOLIC BLOOD PRESSURE: 86 MMHG | BODY MASS INDEX: 33.32 KG/M2 | OXYGEN SATURATION: 96 % | WEIGHT: 200 LBS | TEMPERATURE: 99 F

## 2022-06-01 DIAGNOSIS — L03.032 PARONYCHIA OF GREAT TOE OF LEFT FOOT: ICD-10-CM

## 2022-06-01 PROCEDURE — 99213 OFFICE O/P EST LOW 20 MIN: CPT | Performed by: FAMILY MEDICINE

## 2022-06-01 RX ORDER — AMOXICILLIN AND CLAVULANATE POTASSIUM 875; 125 MG/1; MG/1
1 TABLET, FILM COATED ORAL 2 TIMES DAILY
Qty: 14 TABLET | Refills: 0 | Status: SHIPPED | OUTPATIENT
Start: 2022-06-01 | End: 2022-06-08

## 2022-06-01 ASSESSMENT — ENCOUNTER SYMPTOMS
FOCAL WEAKNESS: 0
SENSORY CHANGE: 0

## 2022-06-01 NOTE — PROGRESS NOTES
Subjective     Thierry Dinh is a 56 y.o. female who presents with No chief complaint on file.            HPI    ROS           Objective     There were no vitals taken for this visit.     Physical Exam                        Assessment & Plan        There are no diagnoses linked to this encounter.

## 2022-06-02 NOTE — PROGRESS NOTES
"Subjective     Thierry Dinh is a 56 y.o. female who presents with Toe Injury (Left; red and swollen; discharge, 2x day)            2 days pain redness and swelling from nail fold of left great toe.  It was nicked while she was getting a pedicure.  No red streaking.  No fever.  Pain is moderate.  No PMH/FH MRSA.  No other aggravating or alleviating factors.      Review of Systems   Musculoskeletal: Negative for joint pain.   Skin: Negative for itching and rash.   Neurological: Negative for sensory change and focal weakness.              Objective     BP (!) 140/86 (BP Location: Right arm, Patient Position: Sitting, BP Cuff Size: Adult)   Pulse 96   Temp 37.2 °C (99 °F) (Temporal)   Resp 16   Ht 1.651 m (5' 5\")   Wt 90.7 kg (200 lb)   SpO2 96%   BMI 33.28 kg/m²      Physical Exam  Constitutional:       Appearance: Normal appearance.   Musculoskeletal:        Feet:    Skin:     General: Skin is warm and dry.   Neurological:      Mental Status: She is alert.                             Assessment & Plan        1. Paronychia of great toe of left foot    - amoxicillin-clavulanate (AUGMENTIN) 875-125 MG Tab; Take 1 Tablet by mouth 2 times a day for 7 days.  Dispense: 14 Tablet; Refill: 0    Differential diagnosis, natural history, supportive care, and indications for immediate follow-up discussed at length.                 "